# Patient Record
Sex: FEMALE | Race: WHITE | NOT HISPANIC OR LATINO | Employment: OTHER | URBAN - METROPOLITAN AREA
[De-identification: names, ages, dates, MRNs, and addresses within clinical notes are randomized per-mention and may not be internally consistent; named-entity substitution may affect disease eponyms.]

---

## 2023-03-31 DIAGNOSIS — I10 HYPERTENSION, UNSPECIFIED TYPE: ICD-10-CM

## 2023-03-31 RX ORDER — QUINAPRIL 20 MG/1
1 TABLET ORAL DAILY
COMMUNITY
Start: 2022-10-04 | End: 2023-03-31 | Stop reason: SDUPTHER

## 2023-03-31 RX ORDER — QUINAPRIL 20 MG/1
20 TABLET ORAL DAILY
Qty: 90 TABLET | Refills: 0 | Status: SHIPPED | OUTPATIENT
Start: 2023-03-31 | End: 2023-10-18 | Stop reason: ALTCHOICE

## 2023-04-06 ENCOUNTER — TELEPHONE (OUTPATIENT)
Dept: PRIMARY CARE | Facility: CLINIC | Age: 73
End: 2023-04-06
Payer: MEDICARE

## 2023-04-06 DIAGNOSIS — I10 PRIMARY HYPERTENSION: Primary | ICD-10-CM

## 2023-04-06 RX ORDER — LISINOPRIL 20 MG/1
20 TABLET ORAL DAILY
Qty: 90 TABLET | Refills: 3 | Status: SHIPPED | OUTPATIENT
Start: 2023-04-06 | End: 2024-02-21

## 2023-04-06 NOTE — TELEPHONE ENCOUNTER
Pt is callign in regards to BP RX not available anymore needs another one to go with hydrochlorothiazide - GAELP

## 2023-04-19 ENCOUNTER — TELEPHONE (OUTPATIENT)
Dept: PRIMARY CARE | Facility: CLINIC | Age: 73
End: 2023-04-19
Payer: MEDICARE

## 2023-04-19 NOTE — TELEPHONE ENCOUNTER
She is switching to Lisinopril from Quinapril is she supposed to take the Hydrochlorothiazide with the Lisinopril when she runs out of the Quinapril?

## 2023-06-20 ENCOUNTER — TELEPHONE (OUTPATIENT)
Dept: PRIMARY CARE | Facility: CLINIC | Age: 73
End: 2023-06-20
Payer: MEDICARE

## 2023-06-20 NOTE — TELEPHONE ENCOUNTER
Pt is calling in regards to back pain for a couple months now and is unable to lay down has to sleep in chair - CHITO

## 2023-06-22 ENCOUNTER — OFFICE VISIT (OUTPATIENT)
Dept: PRIMARY CARE | Facility: CLINIC | Age: 73
End: 2023-06-22
Payer: MEDICARE

## 2023-06-22 VITALS — DIASTOLIC BLOOD PRESSURE: 70 MMHG | SYSTOLIC BLOOD PRESSURE: 128 MMHG

## 2023-06-22 DIAGNOSIS — M54.50 ACUTE BILATERAL LOW BACK PAIN, UNSPECIFIED WHETHER SCIATICA PRESENT: Primary | ICD-10-CM

## 2023-06-22 DIAGNOSIS — I48.0 PAROXYSMAL ATRIAL FIBRILLATION (MULTI): ICD-10-CM

## 2023-06-22 DIAGNOSIS — E11.9 TYPE 2 DIABETES MELLITUS WITHOUT COMPLICATION, WITHOUT LONG-TERM CURRENT USE OF INSULIN (MULTI): ICD-10-CM

## 2023-06-22 PROBLEM — G47.33 OSA (OBSTRUCTIVE SLEEP APNEA): Status: ACTIVE | Noted: 2023-06-22

## 2023-06-22 PROBLEM — E78.5 HYPERLIPIDEMIA: Status: ACTIVE | Noted: 2023-06-22

## 2023-06-22 PROBLEM — E66.9 OBESITY: Status: ACTIVE | Noted: 2023-06-22

## 2023-06-22 PROBLEM — K21.9 ESOPHAGEAL REFLUX: Status: ACTIVE | Noted: 2023-06-22

## 2023-06-22 PROBLEM — I35.0 AORTIC STENOSIS: Status: ACTIVE | Noted: 2023-06-22

## 2023-06-22 PROCEDURE — 1159F MED LIST DOCD IN RCRD: CPT | Performed by: INTERNAL MEDICINE

## 2023-06-22 PROCEDURE — 99213 OFFICE O/P EST LOW 20 MIN: CPT | Performed by: INTERNAL MEDICINE

## 2023-06-22 PROCEDURE — 3078F DIAST BP <80 MM HG: CPT | Performed by: INTERNAL MEDICINE

## 2023-06-22 PROCEDURE — 3074F SYST BP LT 130 MM HG: CPT | Performed by: INTERNAL MEDICINE

## 2023-06-22 PROCEDURE — 1160F RVW MEDS BY RX/DR IN RCRD: CPT | Performed by: INTERNAL MEDICINE

## 2023-06-22 PROCEDURE — 1036F TOBACCO NON-USER: CPT | Performed by: INTERNAL MEDICINE

## 2023-06-22 PROCEDURE — 4010F ACE/ARB THERAPY RXD/TAKEN: CPT | Performed by: INTERNAL MEDICINE

## 2023-06-22 PROCEDURE — 3044F HG A1C LEVEL LT 7.0%: CPT | Performed by: INTERNAL MEDICINE

## 2023-06-22 RX ORDER — METOPROLOL SUCCINATE 25 MG/1
25 TABLET, EXTENDED RELEASE ORAL 2 TIMES DAILY
COMMUNITY
Start: 2013-10-23

## 2023-06-22 RX ORDER — PREDNISONE 10 MG/1
TABLET ORAL
Qty: 12 TABLET | Refills: 0 | Status: SHIPPED | OUTPATIENT
Start: 2023-06-22 | End: 2023-06-28

## 2023-06-22 RX ORDER — PENICILLIN V POTASSIUM 500 MG/1
TABLET, FILM COATED ORAL
COMMUNITY
End: 2023-10-18 | Stop reason: ALTCHOICE

## 2023-06-22 RX ORDER — METFORMIN HYDROCHLORIDE 500 MG/1
TABLET, EXTENDED RELEASE ORAL
COMMUNITY
Start: 2016-10-24 | End: 2023-08-09 | Stop reason: SDUPTHER

## 2023-06-22 RX ORDER — ROSUVASTATIN CALCIUM 10 MG/1
TABLET, COATED ORAL
COMMUNITY
Start: 2020-07-31

## 2023-06-22 RX ORDER — TIZANIDINE HYDROCHLORIDE 4 MG/1
4 CAPSULE, GELATIN COATED ORAL 2 TIMES DAILY PRN
Qty: 30 CAPSULE | Refills: 0 | Status: SHIPPED | OUTPATIENT
Start: 2023-06-22 | End: 2023-10-18 | Stop reason: ALTCHOICE

## 2023-06-22 RX ORDER — HYDROCHLOROTHIAZIDE 12.5 MG/1
TABLET ORAL
COMMUNITY
Start: 2022-10-04 | End: 2024-02-07 | Stop reason: SDUPTHER

## 2023-06-22 RX ORDER — RIVAROXABAN 20 MG/1
TABLET, FILM COATED ORAL
COMMUNITY
Start: 2013-07-12

## 2023-06-22 RX ORDER — METOPROLOL SUCCINATE 50 MG/1
TABLET, EXTENDED RELEASE ORAL
COMMUNITY
End: 2023-10-18 | Stop reason: ALTCHOICE

## 2023-06-22 ASSESSMENT — PAIN SCALES - GENERAL: PAINLEVEL: 10-WORST PAIN EVER

## 2023-06-22 NOTE — PATIENT INSTRUCTIONS
Low back pain   We will treat you with prednisone taper and tizanidine up to 2 times daily as needed for pain (more at night time as it may make you drowsy)  PT will obtain an xray as well

## 2023-06-22 NOTE — PROGRESS NOTES
Subjective   Patient ID: Rachael Shah is a 72 y.o. female.    HPI  LBP for the past 2 month no particular injury noted she has had some issues with back pain in the past  Sometimes radiates down her legs can be either leg  Nighttime is bad  She has been to the chiropractor without success  Has not noted any actual loss of strength  Past medical history significant for atrial fibrillation on Xarelto  Hypertension  Diabetes    Review of Systems    Objective   Physical Exam  Well-developed overweight no acute distress  Cardiovascular regular rate and rhythm  Lungs clear to auscultation percussion  Minimal pain to palpation in the low back there is pain to straight leg raise on the right reflexes diminished bilaterally  Strength 5 of 5 without deficits noted    Assessment/Plan   #1 low back pain without neurologic deficits but with radicular signs ongoing now for about the last 1 to 2 months no known injury.  She has been to the chiropractor I would like to obtain an x-ray of her LS spine we will start a prednisone taper starting with 30 mg decreasing by 10 mg every 2 days until complete she was unable to take nonsteroidal anti-inflammatories because of Xarelto tizanidine 4 mg twice daily as needed especially at nighttime have given her some easy exercises and also referral to physical therapy  2.  Hypertension good control continue current regimen  3.  History of paroxysmal atrial fibrillation clinically in sinus rhythm  4.  Diabetes which has been under adequate control she does know her sugar may increase with the prednisone and she will follow closely  20 minutes were spent with patient of which greater than 50% was spent in counseling and coordination of care  This note was partially generated using the Dragon voice recognition system.  There may be some incorrect wording ,grammar, spelling or punctuation errors that were not corrected prior to committing the note to the medical record.

## 2023-08-09 DIAGNOSIS — E13.9 DIABETES 1.5, MANAGED AS TYPE 2 (MULTI): ICD-10-CM

## 2023-08-09 DIAGNOSIS — E13.9 DIABETES 1.5, MANAGED AS TYPE 2 (MULTI): Primary | ICD-10-CM

## 2023-08-09 RX ORDER — METFORMIN HYDROCHLORIDE 500 MG/1
TABLET, EXTENDED RELEASE ORAL
Qty: 360 TABLET | Refills: 3 | Status: SHIPPED | OUTPATIENT
Start: 2023-08-09 | End: 2023-08-09 | Stop reason: SDUPTHER

## 2023-08-09 RX ORDER — METFORMIN HYDROCHLORIDE 500 MG/1
TABLET, EXTENDED RELEASE ORAL
Qty: 360 TABLET | Refills: 3 | Status: SHIPPED | OUTPATIENT
Start: 2023-08-09 | End: 2024-02-07 | Stop reason: SDUPTHER

## 2023-09-16 PROBLEM — L21.8 OTHER SEBORRHEIC DERMATITIS: Status: ACTIVE | Noted: 2020-12-15

## 2023-09-16 PROBLEM — H90.3 SENSORINEURAL HEARING LOSS, BILATERAL: Status: ACTIVE | Noted: 2021-12-09

## 2023-09-16 PROBLEM — D18.01 HEMANGIOMA OF SKIN AND SUBCUTANEOUS TISSUE: Status: ACTIVE | Noted: 2020-12-15

## 2023-09-16 PROBLEM — D22.5 MELANOCYTIC NEVI OF TRUNK: Status: ACTIVE | Noted: 2020-12-15

## 2023-09-16 PROBLEM — R06.2 WHEEZING ON AUSCULTATION: Status: ACTIVE | Noted: 2023-09-16

## 2023-09-16 PROBLEM — L82.1 OTHER SEBORRHEIC KERATOSIS: Status: ACTIVE | Noted: 2020-12-15

## 2023-09-16 PROBLEM — D22.62 MELANOCYTIC NEVI OF LEFT UPPER LIMB, INCLUDING SHOULDER: Status: ACTIVE | Noted: 2020-12-15

## 2023-09-16 PROBLEM — L57.0 ACTINIC KERATOSIS: Status: ACTIVE | Noted: 2020-12-15

## 2023-09-16 PROBLEM — D22.72 MELANOCYTIC NEVI OF LEFT LOWER LIMB, INCLUDING HIP: Status: ACTIVE | Noted: 2020-12-15

## 2023-09-16 PROBLEM — J30.2 SEASONAL ALLERGIC RHINITIS: Status: ACTIVE | Noted: 2021-12-09

## 2023-09-16 PROBLEM — D48.5 NEOPLASM OF UNCERTAIN BEHAVIOR OF SKIN: Status: ACTIVE | Noted: 2020-12-15

## 2023-09-16 RX ORDER — OXYCODONE AND ACETAMINOPHEN 5; 325 MG/1; MG/1
TABLET ORAL
COMMUNITY
End: 2023-10-18 | Stop reason: ALTCHOICE

## 2023-09-16 RX ORDER — FLUOROURACIL 50 MG/G
1 CREAM TOPICAL
COMMUNITY
Start: 2020-01-24 | End: 2023-10-18 | Stop reason: ALTCHOICE

## 2023-09-16 RX ORDER — AZELASTINE 1 MG/ML
2 SPRAY, METERED NASAL 2 TIMES DAILY
COMMUNITY

## 2023-09-16 RX ORDER — CELECOXIB 200 MG/1
1 CAPSULE ORAL 2 TIMES DAILY
COMMUNITY
Start: 2019-01-03 | End: 2023-10-18 | Stop reason: ALTCHOICE

## 2023-09-16 RX ORDER — PREDNISONE 10 MG/1
TABLET ORAL
COMMUNITY
End: 2023-10-18 | Stop reason: ALTCHOICE

## 2023-09-16 RX ORDER — CLOTRIMAZOLE AND BETAMETHASONE DIPROPIONATE 10; .64 MG/G; MG/G
CREAM TOPICAL
COMMUNITY
End: 2023-10-18 | Stop reason: ALTCHOICE

## 2023-09-16 RX ORDER — DILTIAZEM HYDROCHLORIDE 180 MG/1
180 CAPSULE, EXTENDED RELEASE ORAL DAILY
COMMUNITY
End: 2023-10-18 | Stop reason: ALTCHOICE

## 2023-09-16 RX ORDER — DOCUSATE SODIUM 100 MG/1
1 CAPSULE, LIQUID FILLED ORAL 2 TIMES DAILY
COMMUNITY
Start: 2019-01-03 | End: 2024-02-12 | Stop reason: ALTCHOICE

## 2023-09-16 RX ORDER — FLUTICASONE PROPIONATE 50 MCG
2 SPRAY, SUSPENSION (ML) NASAL DAILY
COMMUNITY
Start: 2015-12-30

## 2023-09-16 RX ORDER — ASCORBIC ACID 500 MG
1 TABLET ORAL 2 TIMES DAILY
COMMUNITY
Start: 2019-01-03 | End: 2024-02-12 | Stop reason: ALTCHOICE

## 2023-09-16 RX ORDER — MINERAL OIL
1 ENEMA (ML) RECTAL DAILY
COMMUNITY
End: 2023-10-18 | Stop reason: ALTCHOICE

## 2023-09-16 RX ORDER — CARISOPRODOL 350 MG/1
1 TABLET ORAL EVERY 8 HOURS PRN
COMMUNITY
Start: 2019-01-03 | End: 2023-10-18 | Stop reason: ALTCHOICE

## 2023-09-16 RX ORDER — ACETAMINOPHEN 500 MG
1 TABLET ORAL DAILY
COMMUNITY

## 2023-09-16 RX ORDER — MONTELUKAST SODIUM 10 MG/1
1 TABLET ORAL DAILY
COMMUNITY
End: 2023-10-18 | Stop reason: ALTCHOICE

## 2023-09-16 RX ORDER — QUINAPRIL HYDROCHLORIDE AND HYDROCHLOROTHIAZIDE 20; 12.5 MG/1; MG/1
TABLET, FILM COATED ORAL
COMMUNITY
Start: 2022-10-02 | End: 2023-10-18 | Stop reason: ALTCHOICE

## 2023-09-16 RX ORDER — ASPIRIN 325 MG
1 TABLET, DELAYED RELEASE (ENTERIC COATED) ORAL 2 TIMES DAILY
COMMUNITY
Start: 2019-01-03 | End: 2023-10-18 | Stop reason: ALTCHOICE

## 2023-10-03 DIAGNOSIS — M79.651 PAIN IN RIGHT THIGH: Primary | ICD-10-CM

## 2023-10-03 DIAGNOSIS — M62.81 WEAKNESS OF TRUNK MUSCULATURE: ICD-10-CM

## 2023-10-04 ENCOUNTER — TREATMENT (OUTPATIENT)
Dept: PHYSICAL THERAPY | Facility: CLINIC | Age: 73
End: 2023-10-04
Payer: MEDICARE

## 2023-10-04 DIAGNOSIS — M62.81 WEAKNESS OF TRUNK MUSCULATURE: ICD-10-CM

## 2023-10-04 DIAGNOSIS — M79.651 RIGHT THIGH PAIN: Primary | ICD-10-CM

## 2023-10-04 DIAGNOSIS — M79.651 PAIN IN RIGHT THIGH: ICD-10-CM

## 2023-10-04 PROCEDURE — 97110 THERAPEUTIC EXERCISES: CPT | Mod: GP | Performed by: PHYSICAL THERAPIST

## 2023-10-04 PROCEDURE — 97035 APP MDLTY 1+ULTRASOUND EA 15: CPT | Mod: GP | Performed by: PHYSICAL THERAPIST

## 2023-10-04 NOTE — PROGRESS NOTES
"Physical Therapy    Physical Therapy Treatment    Patient Name: Rachael Shah  MRN: [id number]  Today's Date: 10/6/2023       Visit Number:  11 (1/3)  Assessment: All prior information for this patient's episode which began on 7/6/2023 is documented in the [employer] system.\"      Patient reported no increase in pain in affected area, even adding some resistance to exercises.  She appears to be gaining strength in hip abductors as the standing lifts are getting easier.      Plan: Continue with ultrasound, taping and progressive gluteal, hamstring and core strengthening and HS stretching.       Current Problem  1. Right thigh pain        2. Pain in right thigh  Follow Up In Physical Therapy      3. Weakness of trunk musculature  Follow Up In Physical Therapy          Subjective     General Garner that  tape and ultrasound were helpful.  Tape lasted 2 days.  Rates pain at 2/10 at top of right Hamstring.  She is planning on taking a trip to [place], which will require sitting for about 6 hours.  She will have help to drive.       Precautions: low fall risk, DM, Afib/flutter, right TKR, L TSR        Pain 2/10, hamstring origin R/L       Objective         Treatments:  Therapeutic Exercise  Therapeutic Exercise Activity 1: Hamstring stretch seated 3 x 30\" R/L  Therapeutic Exercise Activity 2: Standing HS curl 1# 2 x 10  Therapeutic Exercise Activity 3: Standing Hip abduction 1# 2 x 10  Therapeutic Exercise Activity 4: Mini squats 2 x 10  Therapeutic Exercise Activity 6: Recumbent stepper (seat # 13), Level; 1.0  Therapeutic Exercise Activity 7: Standing hip abduction 1# 2x 10, R/L    Manual Therapy  Manual Therapy Activity 1: Therapeutic tape placed on right medial and lateral hamstring muscles, crossing over area of most pain in gluteal area.    Modalities  Modality 1: Timed Ultrasound (1.0 W/cm2, 1.3 Mhz, continuous, x 6 minutes per side R/L to lower gluteal/hamstring attachment)      OP EDUCATION: Reminder of wear schedule and " removal procedures for therapeutic tape.  She was also educated on how to replace the tape if it is helpful and she would like to use it for her trip to [place].       Goals:    Encounter Problems       Encounter Problems (Active)       PT Problem       Activity Limitation: Pt will return to leisure, ADL's, sleep and functional activities with zero restrictions and without symptoms interfering with her positioning/ activity.       Start:  10/04/23    Expected End:  11/03/23            Pain: Pt will be independent with symptom management in order to decrease pain by>=75% to increase participation with ADL, IADL, work and leisure skills.       Start:  10/04/23    Expected End:  11/03/23            Posture: Patient will demonstrate good understanding / awareness of correct postural alignment in sitting and standing and will self correct independently at least 75% of the time to reduce symptoms and improve mobility/ gait mechanics.       Start:  10/04/23    Expected End:  11/03/23            Strength: Pt will increase strength of B hips by >=1/3 MMT grade in all planes and Good TA bracing with tolerating up to 20 reps of core stabilization exercises without increased symptoms in order to normalize mobility       Start:  10/04/23    Expected End:  11/03/23            Modified Oswestry LB, Pt will improve outcome measures score of Modified Oswestry LB to decrease by >=10% disability for increased independence and ease with ADL/IADL's skills and work/leisure activities.       Start:  10/04/23    Expected End:  11/03/23               PT Problem       HEP, Pt will demonstrate independence and compliance with safe and appropriate HEP for lumbar ROM, core and hip strength, dynamic lumbar stabilization, balance, LQ flexibility, pain management and postural training       Start:  10/06/23    Expected End:  11/03/23            ack care principles, Pt will be independent and compliant with safe body mechanics and back care principles  for spinal protection and prevent re-occurence or flare up of symptoms       Start:  10/06/23    Expected End:  11/03/23

## 2023-10-17 DIAGNOSIS — M62.81 WEAKNESS OF TRUNK MUSCULATURE: ICD-10-CM

## 2023-10-17 DIAGNOSIS — M79.651 PAIN IN RIGHT THIGH: Primary | ICD-10-CM

## 2023-10-18 ENCOUNTER — APPOINTMENT (OUTPATIENT)
Dept: PHYSICAL THERAPY | Facility: CLINIC | Age: 73
End: 2023-10-18
Payer: MEDICARE

## 2023-10-18 ENCOUNTER — APPOINTMENT (OUTPATIENT)
Dept: ENDOCRINOLOGY | Facility: CLINIC | Age: 73
End: 2023-10-18
Payer: MEDICARE

## 2023-10-18 ENCOUNTER — OFFICE VISIT (OUTPATIENT)
Dept: PRIMARY CARE | Facility: CLINIC | Age: 73
End: 2023-10-18
Payer: MEDICARE

## 2023-10-18 VITALS
DIASTOLIC BLOOD PRESSURE: 60 MMHG | OXYGEN SATURATION: 98 % | SYSTOLIC BLOOD PRESSURE: 124 MMHG | HEART RATE: 73 BPM | TEMPERATURE: 98.2 F

## 2023-10-18 DIAGNOSIS — I48.0 PAROXYSMAL ATRIAL FIBRILLATION (MULTI): ICD-10-CM

## 2023-10-18 DIAGNOSIS — R06.2 WHEEZING ON AUSCULTATION: ICD-10-CM

## 2023-10-18 DIAGNOSIS — R05.1 ACUTE COUGH: Primary | ICD-10-CM

## 2023-10-18 PROBLEM — L57.0 ACTINIC KERATOSIS: Status: RESOLVED | Noted: 2020-12-15 | Resolved: 2023-10-18

## 2023-10-18 PROBLEM — D22.72 MELANOCYTIC NEVI OF LEFT LOWER LIMB, INCLUDING HIP: Status: RESOLVED | Noted: 2020-12-15 | Resolved: 2023-10-18

## 2023-10-18 PROBLEM — H90.3 SENSORINEURAL HEARING LOSS, BILATERAL: Status: RESOLVED | Noted: 2021-12-09 | Resolved: 2023-10-18

## 2023-10-18 PROBLEM — J30.2 SEASONAL ALLERGIC RHINITIS: Status: RESOLVED | Noted: 2021-12-09 | Resolved: 2023-10-18

## 2023-10-18 PROBLEM — L21.8 OTHER SEBORRHEIC DERMATITIS: Status: RESOLVED | Noted: 2020-12-15 | Resolved: 2023-10-18

## 2023-10-18 PROBLEM — D22.5 MELANOCYTIC NEVI OF TRUNK: Status: RESOLVED | Noted: 2020-12-15 | Resolved: 2023-10-18

## 2023-10-18 PROBLEM — D18.01 HEMANGIOMA OF SKIN AND SUBCUTANEOUS TISSUE: Status: RESOLVED | Noted: 2020-12-15 | Resolved: 2023-10-18

## 2023-10-18 PROBLEM — M79.651 RIGHT THIGH PAIN: Status: RESOLVED | Noted: 2023-10-04 | Resolved: 2023-10-18

## 2023-10-18 PROBLEM — D22.62 MELANOCYTIC NEVI OF LEFT UPPER LIMB, INCLUDING SHOULDER: Status: RESOLVED | Noted: 2020-12-15 | Resolved: 2023-10-18

## 2023-10-18 PROBLEM — M62.81 WEAKNESS OF TRUNK MUSCULATURE: Status: RESOLVED | Noted: 2023-10-04 | Resolved: 2023-10-18

## 2023-10-18 PROBLEM — L82.1 OTHER SEBORRHEIC KERATOSIS: Status: RESOLVED | Noted: 2020-12-15 | Resolved: 2023-10-18

## 2023-10-18 PROCEDURE — 3008F BODY MASS INDEX DOCD: CPT | Performed by: INTERNAL MEDICINE

## 2023-10-18 PROCEDURE — 1159F MED LIST DOCD IN RCRD: CPT | Performed by: INTERNAL MEDICINE

## 2023-10-18 PROCEDURE — 1160F RVW MEDS BY RX/DR IN RCRD: CPT | Performed by: INTERNAL MEDICINE

## 2023-10-18 PROCEDURE — 4010F ACE/ARB THERAPY RXD/TAKEN: CPT | Performed by: INTERNAL MEDICINE

## 2023-10-18 PROCEDURE — 1036F TOBACCO NON-USER: CPT | Performed by: INTERNAL MEDICINE

## 2023-10-18 PROCEDURE — 3044F HG A1C LEVEL LT 7.0%: CPT | Performed by: INTERNAL MEDICINE

## 2023-10-18 PROCEDURE — 1126F AMNT PAIN NOTED NONE PRSNT: CPT | Performed by: INTERNAL MEDICINE

## 2023-10-18 PROCEDURE — 3074F SYST BP LT 130 MM HG: CPT | Performed by: INTERNAL MEDICINE

## 2023-10-18 PROCEDURE — 99213 OFFICE O/P EST LOW 20 MIN: CPT | Performed by: INTERNAL MEDICINE

## 2023-10-18 PROCEDURE — 3078F DIAST BP <80 MM HG: CPT | Performed by: INTERNAL MEDICINE

## 2023-10-18 RX ORDER — DOXYCYCLINE 100 MG/1
100 CAPSULE ORAL 2 TIMES DAILY
Qty: 14 CAPSULE | Refills: 0 | Status: SHIPPED | OUTPATIENT
Start: 2023-10-18 | End: 2023-10-25

## 2023-10-18 RX ORDER — PREDNISONE 10 MG/1
TABLET ORAL
Qty: 12 TABLET | Refills: 0 | Status: SHIPPED | OUTPATIENT
Start: 2023-10-18 | End: 2023-10-23

## 2023-10-18 RX ORDER — BENZONATATE 200 MG/1
200 CAPSULE ORAL 3 TIMES DAILY PRN
Qty: 42 CAPSULE | Refills: 0 | Status: SHIPPED | OUTPATIENT
Start: 2023-10-18 | End: 2023-11-17

## 2023-10-18 NOTE — PROGRESS NOTES
Subjective   Patient ID: Rachael Shah is a 73 y.o. female.    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[facility])     Hypertension     ANTON (obstructive sleep apnea)                 Objective   Physical Exam  Well-developed overweight no acute distress  HEENT there is cobblestoning present in the posterior pharynx  Turbinates are normal  There are scattered rhonchi present throughout the lungs with increased MIR ratio noted throughout there are no focal abnormalities noted  Cardiovascular regular rate and rhythm    Assessment/Plan   #1 cough which I think is more consistent with an asthmatic bronchitis we have opted to go ahead and empirically treat with antibiotic doxycycline 100 mg twice daily a prednisone taper starting with 30 mg decreasing by 10 mg every 2 days until complete Tessalon Perles 3 times daily as needed as well  2.  Hypertension good control continue current regimen  3.  History of paroxysmal atrial fibrillation clinically in sinus rhythm  4.  Health maintenance  She did receive COVID-19 vaccination and flu vaccine  I do recommend RSV for her  Does not appear that she has had Shingrix vaccination either and I have recommended this  25 minutes were spent with patient of which greater than 50% was spent in counseling and coordination of care  This note was partially generated using the Dragon voice recognition system.  There may be some incorrect wording ,grammar, spelling or punctuation errors that were not corrected prior to committing the note to the medical record.

## 2023-10-18 NOTE — PATIENT INSTRUCTIONS
For the respiratory infection with wheezing although this may be viral in nature as possible it is an asthmatic bronchitis so we are going to empirically place you on doxycycline 100 mg twice daily as well as a prednisone taper Tessalon Perles as needed for cough  As I Review records it does appear that you need the Shingrix vaccination this is a series of 2 vaccinations and you will need to get this at the pharmacy  You are good on pneumonia vaccine you have had both a Prevnar 13 as well as Pneumovax which is all you need   I do recommend RSV vaccination for you and this will need to be at the pharmacy as well

## 2023-10-24 ENCOUNTER — TELEPHONE (OUTPATIENT)
Dept: PRIMARY CARE | Facility: CLINIC | Age: 73
End: 2023-10-24
Payer: MEDICARE

## 2023-10-24 DIAGNOSIS — R06.2 WHEEZING: Primary | ICD-10-CM

## 2023-10-24 RX ORDER — FLUTICASONE PROPIONATE 110 UG/1
1 AEROSOL, METERED RESPIRATORY (INHALATION)
Qty: 12 G | Refills: 0 | Status: SHIPPED | OUTPATIENT
Start: 2023-10-24 | End: 2023-11-20

## 2023-10-24 NOTE — TELEPHONE ENCOUNTER
She is about 50% better but her wheezing is really bad at night and some in the am is there an inhaler you can prescribe or anything else to do?

## 2023-10-25 ENCOUNTER — DOCUMENTATION (OUTPATIENT)
Dept: PHYSICAL THERAPY | Facility: CLINIC | Age: 73
End: 2023-10-25
Payer: MEDICARE

## 2023-10-25 ENCOUNTER — APPOINTMENT (OUTPATIENT)
Dept: PHYSICAL THERAPY | Facility: CLINIC | Age: 73
End: 2023-10-25
Payer: MEDICARE

## 2023-10-25 NOTE — PROGRESS NOTES
Physical Therapy                 Therapy Communication Note    Patient Name: Rachael Shah  MRN: [id number]  Today's Date: 10/25/2023     Discipline: Physical Therapy    Missed Visit Reason:  Patient called and left a message.  She canceled for today due to respiratory illness.  She also requested discharge at this time as she is doing well.    Missed Time: Cancel

## 2023-10-25 NOTE — PROGRESS NOTES
Physical Therapy    Discharge Summary    Name: Rachael Shah  MRN: [id number]  : 1950  Date: 10/25/23    Discharge Summary: PT    Discharge Information: Date of discharge 10/25/23, Date of last visit 10/04/23, Date of evaluation 23, Number of attended visits 12, Referred by Richelle, and Referred for Acute Bilateral LBP    Therapy Summary: Patient completed 12 visits of physical therapy, focusing on hamstring stretching, core and hip strengthening.  Her LBP has resolved and is not restricting her mobility. She reports 50% or greater reduction of pain in right hamstring area, improved hamstring flexibility, improved tolerance to sitting/driving.  She is I and adherent with her HEP.    Discharge Status: Goals partially to fully met.  Discharge to HEP.     Rehab Discharge Reason: Achieved all and/or the most significant goals(s) and Patient requested due to insurance authorization

## 2023-11-20 DIAGNOSIS — R06.2 WHEEZING: ICD-10-CM

## 2023-11-20 RX ORDER — FLUTICASONE PROPIONATE 110 UG/1
1 AEROSOL, METERED RESPIRATORY (INHALATION)
Qty: 12 G | Refills: 3 | Status: SHIPPED | OUTPATIENT
Start: 2023-11-20 | End: 2024-02-12 | Stop reason: ALTCHOICE

## 2024-01-05 ENCOUNTER — TELEPHONE (OUTPATIENT)
Dept: PRIMARY CARE | Facility: CLINIC | Age: 74
End: 2024-01-05
Payer: MEDICARE

## 2024-01-05 NOTE — TELEPHONE ENCOUNTER
Post nasal drip, sinus pressure, laryngitis started yesterday. No fever or chills. Her  has COVID and she will continue to test as she has been negative but what to take for these sxs now?

## 2024-01-18 ENCOUNTER — CLINICAL SUPPORT (OUTPATIENT)
Dept: AUDIOLOGY | Facility: CLINIC | Age: 74
End: 2024-01-18
Payer: MEDICARE

## 2024-01-18 DIAGNOSIS — H90.3 SENSORINEURAL HEARING LOSS (SNHL) OF BOTH EARS: Primary | ICD-10-CM

## 2024-01-18 NOTE — PROGRESS NOTES
HEARING AID CHECK    Name:  Rachael Shah  :  1950  Age:  73 y.o.    HEARING AID INFORMATION:    Right Hearing Aid  Oticon more 1  SN: [id number]  Warranty: 2025  Left Hearing Aid  Oticon more 1  SN: [id number]  Warranty: 2025    SN: [id number]   Length   Right: #2 85 gain  Left: #2 85 gain  Dome/Earmold  Right: 8 mm open base  Left: 8 mm open base  Wax Filter  pro wax mini fit  Battery Size  rechargeable      HCS Follow-up Expiration  2023      HISTORY:    Patient seen for routine hearing aid check.  No reported concerns or complaints with the sound quality or Bluetooth connectivity.  Patient reported some crackling sensations in the left ear as she has an upper respiratory infection that is still clearing.    EXAM/PROCEDURES:    Otoscopy revealed minimal non-occluding cerumen with normal appearing tympanic membranes, bilaterally.    Cleaned and checked.    Vacuumed sharda ports and  ports. Replaced wax filters, retention wires, and domes.     Placed in aurovac drying chamber. Listening check indicated hearing aids functioning appropriately with no distortion or internal feedback indicated.     Connected to hearing aid fitting software.  Completed firmware update.  No further programming changes were made at this time.      RECOMMENDATIONS AND FOLLOW-UP:    Recommended and scheduled 6-month follow-up HAC.  Patient to contact the office sooner if problems arise.    Patient was advised to schedule with Dr. Nunn if her ear symptoms persist.      Cynthia Gonzales  Clinical Audiologist

## 2024-02-07 DIAGNOSIS — E13.9 DIABETES 1.5, MANAGED AS TYPE 2 (MULTI): ICD-10-CM

## 2024-02-07 DIAGNOSIS — I48.0 PAROXYSMAL ATRIAL FIBRILLATION (MULTI): ICD-10-CM

## 2024-02-07 RX ORDER — HYDROCHLOROTHIAZIDE 12.5 MG/1
TABLET ORAL
Qty: 90 TABLET | Refills: 3 | Status: SHIPPED | OUTPATIENT
Start: 2024-02-07 | End: 2024-02-07

## 2024-02-07 RX ORDER — METFORMIN HYDROCHLORIDE 500 MG/1
TABLET, EXTENDED RELEASE ORAL
Qty: 360 TABLET | Refills: 3 | Status: SHIPPED | OUTPATIENT
Start: 2024-02-07 | End: 2024-04-29 | Stop reason: SINTOL

## 2024-02-07 RX ORDER — HYDROCHLOROTHIAZIDE 12.5 MG/1
TABLET ORAL
Qty: 90 TABLET | Refills: 3 | Status: SHIPPED | OUTPATIENT
Start: 2024-02-07

## 2024-02-12 ENCOUNTER — OFFICE VISIT (OUTPATIENT)
Dept: ENDOCRINOLOGY | Facility: CLINIC | Age: 74
End: 2024-02-12
Payer: MEDICARE

## 2024-02-12 ENCOUNTER — TELEPHONE (OUTPATIENT)
Dept: ENDOCRINOLOGY | Facility: CLINIC | Age: 74
End: 2024-02-12

## 2024-02-12 VITALS
SYSTOLIC BLOOD PRESSURE: 122 MMHG | BODY MASS INDEX: 39.39 KG/M2 | TEMPERATURE: 96.7 F | HEIGHT: 65 IN | DIASTOLIC BLOOD PRESSURE: 75 MMHG | HEART RATE: 66 BPM | WEIGHT: 236.4 LBS

## 2024-02-12 DIAGNOSIS — E66.8 CONSTITUTIONAL OBESITY: ICD-10-CM

## 2024-02-12 DIAGNOSIS — Z76.89 ENCOUNTER FOR WEIGHT MANAGEMENT: ICD-10-CM

## 2024-02-12 DIAGNOSIS — E11.9 TYPE 2 DIABETES MELLITUS WITHOUT COMPLICATION, WITHOUT LONG-TERM CURRENT USE OF INSULIN (MULTI): ICD-10-CM

## 2024-02-12 DIAGNOSIS — E11.9 TYPE 2 DIABETES MELLITUS WITHOUT COMPLICATION, WITHOUT LONG-TERM CURRENT USE OF INSULIN (MULTI): Primary | ICD-10-CM

## 2024-02-12 PROCEDURE — 1157F ADVNC CARE PLAN IN RCRD: CPT | Performed by: INTERNAL MEDICINE

## 2024-02-12 PROCEDURE — 1159F MED LIST DOCD IN RCRD: CPT | Performed by: INTERNAL MEDICINE

## 2024-02-12 PROCEDURE — 99204 OFFICE O/P NEW MOD 45 MIN: CPT | Performed by: INTERNAL MEDICINE

## 2024-02-12 PROCEDURE — 3008F BODY MASS INDEX DOCD: CPT | Performed by: INTERNAL MEDICINE

## 2024-02-12 PROCEDURE — 1036F TOBACCO NON-USER: CPT | Performed by: INTERNAL MEDICINE

## 2024-02-12 PROCEDURE — 3074F SYST BP LT 130 MM HG: CPT | Performed by: INTERNAL MEDICINE

## 2024-02-12 PROCEDURE — 1126F AMNT PAIN NOTED NONE PRSNT: CPT | Performed by: INTERNAL MEDICINE

## 2024-02-12 PROCEDURE — 4010F ACE/ARB THERAPY RXD/TAKEN: CPT | Performed by: INTERNAL MEDICINE

## 2024-02-12 PROCEDURE — 3078F DIAST BP <80 MM HG: CPT | Performed by: INTERNAL MEDICINE

## 2024-02-12 RX ORDER — TIRZEPATIDE 2.5 MG/.5ML
2.5 INJECTION, SOLUTION SUBCUTANEOUS
Qty: 0.5 ML | Refills: 2 | Status: SHIPPED | OUTPATIENT
Start: 2024-02-12 | End: 2024-02-29 | Stop reason: CLARIF

## 2024-02-12 NOTE — TELEPHONE ENCOUNTER
Prior Auth for mounjaro pending determination  Submitted on 2/12/24 via epic    *likely will be denied for not having trialed 2 preferred: trulicity, victoza, rybelsus, byetta, and ozempic

## 2024-02-12 NOTE — PROGRESS NOTES
Patient is seen at the request of Dr. Peoples for my opinion regarding weight management. My final recommendations will be communicated back to the requesting provider by way of shared medical record.    NEW  Subjective   Rachael Shah is a 73 y.o. female with a hx of Afib (h/o multiple cardioversions and a few ablations), DM T2, HTN, ANTON, HLD who presents for weight management and obesity.    1. Weight history: Weight has increased gradually the last 15 years. Has gained 15lbs in the last year.   --Any previous programs: Tried Noom- lost 15lbs, then gained back     2. Sleep: Has ANTON- uses cpap     3. Stress: 5/10, retired, volunteers, , no children, has 4 dogs!     4. Exercise: no    5. Appetite control: controlled -- is not a vegetable fan.  Breakfast: english muffin with coffeee  Lunch: sandwich/big salad  Dinner: meat or pasta with fruit and carb  Snacks: usually during the day. Cookies/piece of cheese/granola bar     Any personal history of pancreatitis?: no  Any personal or family history of medullary thyroid cancer or MEN (multiple endocrine neoplasia)?: no    6. Diabetes T2DM : on metformin  Discussed adding a GLP-1       Current Outpatient Medications:     azelastine (Astelin) 137 mcg (0.1 %) nasal spray, Administer 2 sprays into each nostril 2 times a day., Disp: , Rfl:     cholecalciferol (Vitamin D-3) 50 mcg (2,000 unit) capsule, Take 1 capsule (50 mcg) by mouth early in the morning.., Disp: , Rfl:     fluticasone (Flonase) 50 mcg/actuation nasal spray, Administer 2 sprays into each nostril once daily., Disp: , Rfl:     hydroCHLOROthiazide (HYDRODiuril) 12.5 mg tablet, Take I tablet daily, Disp: 90 tablet, Rfl: 3    lisinopril (PriniviL) 20 mg tablet, Take 1 tablet (20 mg) by mouth once daily., Disp: 90 tablet, Rfl: 3    metFORMIN  mg 24 hr tablet, Take 4 daily, Disp: 360 tablet, Rfl: 3    metoprolol succinate XL (Toprol-XL) 25 mg 24 hr tablet, Take 1 tablet (25 mg) by mouth 2  times a day., Disp: , Rfl:     multivit-min/iron/FA/vit K/lut (CENTRUM SILVER WOMEN ORAL), Take 1 tablet by mouth once daily., Disp: , Rfl:     rosuvastatin (Crestor) 10 mg tablet, rosuvastatin 10 mg tablet  TAKE 1 TABLET BY MOUTH EVERY DAY, Disp: , Rfl:     Xarelto 20 mg tablet, Xarelto 20 mg tablet  TAKE 1 TABLET BY MOUTH DAILY WITH THE EVENING MEAL, Disp: , Rfl:     ROS:  System: normal  Eyes : no visual changes  Neck : no tenderness, no new lumps/bumps  Respiratory : no SOB  Cardiovascular : no chest pain, no palpitations  Gastro-Intestinal : no abdominal concerns  Neurological : no numbness or tingling in the extremities  Musculoskeletal : no joint paint, no muscle pain  Skin : no unusual rashes  Psychiatric : no depression, no anxiety  See HPI for Endocrine ROS    Past Medical History:   Diagnosis Date    Atrial fibrillation (CMS/[facility])     Diabetes 1.5, 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    Social History     Socioeconomic History    Marital status:      Spouse name: Not on file    Number of children: Not on file    Years of education: Not on file    Highest education level: Not on file   Occupational History    Not on file   Tobacco Use    Smoking status: Former     Years: 15     Types: Cigarettes    Smokeless tobacco: Never   Vaping Use    Vaping Use: Never used   Substance and Sexual Activity    Alcohol use: Not on file    Drug use: Not on file    Sexual activity: Not on file   Other Topics Concern    Not on file   Social History Narrative    Not on file     Social Determinants of Health     Financial Resource Strain: Not on file   Food Insecurity: Not on file   Transportation Needs: Not on file   Physical Activity: Not on file   Stress: Not on file   Social Connections: Not on file   Intimate Partner Violence: Not on file   Housing Stability: Not on file       Objective    Physical Exam:  There were no vitals taken  for this visit.  General : alert and oriented X3, no acute distress  Eyes : EOMI   Neck : non tender, supple  Thyroid : no palpable nodules  Heart : regular rate and rhythm  ABD : no obvious abnormalities, soft to palpation  Extremities : no edema  Neuro : normal  Other :    Assessment/Plan   Rachael Shah is a 73 y.o. 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    1. Weight Management : Reviewed the principles of energy metabolism, caloric intake and expenditure, and rationale for a treatment program.  Also reinforced need for reduced calorie, low fat diet and increased physical activity.    We reviewed the possibility of starting an interdisciplinary lifestyle intervention program involving improvement of the diet, a personalized exercise program, efforts to reduce the stress and the possibility of using appetite suppressant medications in an effort to help with the weight loss process.  The patient expressed interest in the plan.    2. Nutrition : I discussed trying one of the diet approaches we have here in the program : Mediterranean lifestyle, ketosis diet.  The patient will be referred to the Registered Dietician for education on their diet of choice.  The dietary booklet was provided in the visit today.    2/12/24: 236lb, 39.34kg/m2    3. Sleep : Has ANTON - on CPAP    4. Stress : 5/10, retired, volunteers, , no children, has 4 dogs!     5. Exercise : none -- suggested water exercises    6. Appetite : is high  Discussed AOM's  Would AVOID a stimulant - difficult to control Afib  Consider contrave.    7. DM2 : on metformin  Will add mounjaro 2.5mg/wk.    Follow up in a group visit.  Sky Cruz MD

## 2024-02-13 DIAGNOSIS — Z00.00 ROUTINE HEALTH MAINTENANCE: ICD-10-CM

## 2024-02-15 ENCOUNTER — TELEMEDICINE CLINICAL SUPPORT (OUTPATIENT)
Dept: ENDOCRINOLOGY | Facility: CLINIC | Age: 74
End: 2024-02-15
Payer: MEDICARE

## 2024-02-15 VITALS — WEIGHT: 236 LBS | HEIGHT: 65 IN | BODY MASS INDEX: 39.32 KG/M2

## 2024-02-15 DIAGNOSIS — Z71.3 DIETARY COUNSELING: ICD-10-CM

## 2024-02-15 DIAGNOSIS — E11.9 TYPE 2 DIABETES MELLITUS WITHOUT COMPLICATION, WITHOUT LONG-TERM CURRENT USE OF INSULIN (MULTI): Primary | ICD-10-CM

## 2024-02-15 PROCEDURE — 97802 MEDICAL NUTRITION INDIV IN: CPT | Performed by: DIETITIAN, REGISTERED

## 2024-02-15 RX ORDER — SEMAGLUTIDE 0.68 MG/ML
0.25 INJECTION, SOLUTION SUBCUTANEOUS
Qty: 2 ML | Refills: 3 | Status: SHIPPED | OUTPATIENT
Start: 2024-02-15

## 2024-02-15 NOTE — PROGRESS NOTES
"Initial Nutrition Assessment    Patient was referred to nutrition by Dr. Papa Cruz for weight management/desire to lose weight. Other PMHX considered is DM, HLD, HTN and reflux. Pt also with history of A-fib and ANTON.     Diet recall reveals a consistent meal pattern with rare missed meals; however, possible intakes of larger portions that recommended for desired weight loss and some calorically dense foods all likely contributing to lack of desired weight loss at this time. Fluids are currently not meeting recommendations, and likely contributing to dehydration. Pt is not incorporating consistent physical activity at this time and would likely benefit from increased structured activity to assist in achieving goals. See all interventions/recommendations below as discussed during visit this day.      Patient reported symptoms: Difficulty losing weight    Anthropometrics:  Height:   Ht Readings from Last 1 Encounters:   02/15/24 1.651 m (5' 5\")      Weight:   Wt Readings from Last 10 Encounters:   02/15/24 107 kg (236 lb)   02/12/24 107 kg (236 lb 6.4 oz)   07/12/23 109 kg (241 lb)   03/06/23 107 kg (235 lb)   02/16/23 106 kg (233 lb)   07/06/22 102 kg (225 lb 4 oz)   05/11/22 102 kg (224 lb 8 oz)   04/21/22 104 kg (229 lb 2 oz)   01/14/22 102 kg (225 lb 3 oz)   12/23/21 102 kg (224 lb)      Current BMI:   BMI Readings from Last 1 Encounters:   02/15/24 39.27 kg/m²        Labs:  Lab Results   Component Value Date    HGBA1C 6.2 (A) 02/13/2023      Lab Results   Component Value Date    CHOL 142 02/13/2023    CHOL 149 12/23/2021    CHOL 148 12/14/2020     Lab Results   Component Value Date    HDL 70.8 02/13/2023    HDL 67.3 12/23/2021    HDL 68.3 12/14/2020     No results found for: \"LDLCALC\"  Lab Results   Component Value Date    TRIG 154 (H) 02/13/2023    TRIG 102 12/23/2021    TRIG 112 12/14/2020       Malnutrition Screening:  Significant Unintentional weight loss: No  Eating less than 75% of usual intake for " more than 2 weeks: No  Potential Signs of Inflammation: No    Recommended Malnutrition Diagnosis: No malnutrition identified    Diet Recall-  Breakfast- English muffin (Sourdough) with egg beaters and butter and sometimes a small amount of cheese   Lunch- sandwich (white bread with ham and swiss) with chips OR large salad with crispy wantons OR dried pepper chips along with dressing (Creamy Italian) and cheese  Dinner- various proteins (chicken, beef, pork), fruit and starches such as potato    Daily Snacks- cheese, cookies, granola bar, crispy cheese strips   Beverages- coffee in am with coffee mate, water during the day (16 ounces daily) and one Coke Zero daily   Alcohol- very rare  Physical Activity- None at this time with exception of walking with the dogs    Other pertinent patient reported Information:  - Past weight loss attempts include: Noom- lost 15 lbs but since gained it back    - Participates in dog shows and has a goal to reduce weight for overall health but also for increased mobility to participate in dog shows easier    Nutrition Diagnosis: Food and nutrition-related knowledge deficit related to lack of prior exposure to information as evidenced by BMI above normative standard for age and gender.    Readiness to Learn:  Cognitive ability: Alert and oriented  Motivation to learn: Interested  Family Support: Unable to assess- family not present  Instruction provided to: Patient  Patient learns best by: Multiple methods  Factors affecting learning: None   Physical limitations affecting learning: None    Education Materials Provided:   Your Mediterranean Meal Plan Booklet provided during recent visit with Dr. Papa Cruz and reviewed during visit this day.    Nutrition Interventions/Recommendations for 2/15/2024:  - Please refer to your book entitled: Your Mediterranean Meal Plan, and follow Mediterranean guidelines as discussed.  - The Healthy Plate style of eating can be a helpful tool for  incorporating healthy balanced meals in appropriate portions. (Healthy Plate: Start with a 9-inch diameter plate. Fill 1/2 the plate with non-starchy vegetables, 1/4 of the plate with whole grains or starchy vegetables, and 1/4 of the plate with a lean source of protein.   - Consider pre-planning healthy meals and snacks for the week. Refer to book for menus and recipes to get you started.  - Incorporate strategies of mindful eating every day. Practice staying in tune with your body's hunger cues and eat only when truly hungry. Avoid emotional eating/eating when not hungry.  - Aim for 48-64 ounces of water daily.  - Aim for 150 minutes of moderate-intensity physical activity per week as an ultimate goal but start off with twice weekly for 10-15 minutes with either the recumbent bike or with You Tube chair exercise videos.  - Follow-up as scheduled for the group classes with Dr. Papa Cruz.  - Follow-up with nutrition on 4/11/23 as scheduled.      Nutrition Monitoring & Evaluation: Weight loss of 1-2 lbs per week and adherence to recommendations and patient stated goals    Need for follow-up: As scheduled for Dr. Gallegos's Shared Medical Appointment (SMA) and as scheduled for individual nutrition appointment.    Referred by: Dr. Papa Cruz    MNT Billing Type: Medical Nutrition Assessment, each 15 min increment, for 4 increments.    SIGNATURE:   Deandra Snow RD, LD, [facility]                                                             DATE:   2/15/2024

## 2024-02-15 NOTE — PATIENT INSTRUCTIONS
- Please refer to your book entitled: Your Mediterranean Meal Plan, and follow Mediterranean guidelines as discussed.  - The Healthy Plate style of eating can be a helpful tool for incorporating healthy balanced meals in appropriate portions. (Healthy Plate: Start with a 9-inch diameter plate. Fill 1/2 the plate with non-starchy vegetables, 1/4 of the plate with whole grains or starchy vegetables, and 1/4 of the plate with a lean source of protein.   - Consider pre-planning healthy meals and snacks for the week. Refer to book for menus and recipes to get you started.  - Incorporate strategies of mindful eating every day. Practice staying in tune with your body's hunger cues and eat only when truly hungry. Avoid emotional eating/eating when not hungry.  - Aim for 48-64 ounces of water daily.  - Aim for 150 minutes of moderate-intensity physical activity per week as an ultimate goal but start off with twice weekly for 10-15 minutes with either the recumbent bike or with You Tube chair exercise videos.  - Follow-up as scheduled for the group classes with Dr. Papa Cruz.  - Follow-up with nutrition on 4/11/23 as scheduled.

## 2024-02-15 NOTE — TELEPHONE ENCOUNTER
Mounjaro denied   Insurance requires patient to try and fail two preferred medications. The following medications are bydureon, byetta, ozempic, rybelsus, trulicity and victoza.     Ozempic 0.25mg pended

## 2024-02-16 NOTE — TELEPHONE ENCOUNTER
Left patient a detailed message notifying that Mounjaro was denied, provider sent Ozempic to the pharmacy. Asked that patient contact our office if she had any further questions.

## 2024-02-20 ENCOUNTER — LAB (OUTPATIENT)
Dept: LAB | Facility: LAB | Age: 74
End: 2024-02-20
Payer: MEDICARE

## 2024-02-20 DIAGNOSIS — I10 PRIMARY HYPERTENSION: ICD-10-CM

## 2024-02-20 DIAGNOSIS — Z00.00 ROUTINE HEALTH MAINTENANCE: ICD-10-CM

## 2024-02-20 LAB
25(OH)D3 SERPL-MCNC: 30 NG/ML (ref 30–100)
ALBUMIN SERPL BCP-MCNC: 4.3 G/DL (ref 3.4–5)
ALP SERPL-CCNC: 47 U/L (ref 33–136)
ALT SERPL W P-5'-P-CCNC: 14 U/L (ref 7–45)
ANION GAP SERPL CALC-SCNC: 15 MMOL/L (ref 10–20)
APPEARANCE UR: CLEAR
AST SERPL W P-5'-P-CCNC: 17 U/L (ref 9–39)
BASOPHILS # BLD AUTO: 0.09 X10*3/UL (ref 0–0.1)
BASOPHILS NFR BLD AUTO: 1.5 %
BILIRUB SERPL-MCNC: 0.9 MG/DL (ref 0–1.2)
BILIRUB UR STRIP.AUTO-MCNC: NEGATIVE MG/DL
BUN SERPL-MCNC: 23 MG/DL (ref 6–23)
CALCIUM SERPL-MCNC: 9.9 MG/DL (ref 8.6–10.6)
CHLORIDE SERPL-SCNC: 103 MMOL/L (ref 98–107)
CHOLEST SERPL-MCNC: 139 MG/DL (ref 0–199)
CHOLESTEROL/HDL RATIO: 2
CO2 SERPL-SCNC: 27 MMOL/L (ref 21–32)
COLOR UR: NORMAL
CREAT SERPL-MCNC: 0.95 MG/DL (ref 0.5–1.05)
CRP SERPL HS-MCNC: 3.7 MG/L
EGFRCR SERPLBLD CKD-EPI 2021: 63 ML/MIN/1.73M*2
EOSINOPHIL # BLD AUTO: 0.22 X10*3/UL (ref 0–0.4)
EOSINOPHIL NFR BLD AUTO: 3.6 %
ERYTHROCYTE [DISTWIDTH] IN BLOOD BY AUTOMATED COUNT: 12.3 % (ref 11.5–14.5)
EST. AVERAGE GLUCOSE BLD GHB EST-MCNC: 140 MG/DL
GLUCOSE SERPL-MCNC: 153 MG/DL (ref 74–99)
GLUCOSE UR STRIP.AUTO-MCNC: NORMAL MG/DL
HBA1C MFR BLD: 6.5 %
HCT VFR BLD AUTO: 37.1 % (ref 36–46)
HDLC SERPL-MCNC: 69.2 MG/DL
HGB BLD-MCNC: 12.3 G/DL (ref 12–16)
IMM GRANULOCYTES # BLD AUTO: 0.03 X10*3/UL (ref 0–0.5)
IMM GRANULOCYTES NFR BLD AUTO: 0.5 % (ref 0–0.9)
KETONES UR STRIP.AUTO-MCNC: NEGATIVE MG/DL
LDLC SERPL CALC-MCNC: 44 MG/DL
LEUKOCYTE ESTERASE UR QL STRIP.AUTO: NEGATIVE
LYMPHOCYTES # BLD AUTO: 1.92 X10*3/UL (ref 0.8–3)
LYMPHOCYTES NFR BLD AUTO: 31.2 %
MCH RBC QN AUTO: 31.8 PG (ref 26–34)
MCHC RBC AUTO-ENTMCNC: 33.2 G/DL (ref 32–36)
MCV RBC AUTO: 96 FL (ref 80–100)
MONOCYTES # BLD AUTO: 0.67 X10*3/UL (ref 0.05–0.8)
MONOCYTES NFR BLD AUTO: 10.9 %
NEUTROPHILS # BLD AUTO: 3.23 X10*3/UL (ref 1.6–5.5)
NEUTROPHILS NFR BLD AUTO: 52.3 %
NITRITE UR QL STRIP.AUTO: NEGATIVE
NON HDL CHOLESTEROL: 70 MG/DL (ref 0–149)
NRBC BLD-RTO: 0 /100 WBCS (ref 0–0)
PH UR STRIP.AUTO: 5 [PH]
PLATELET # BLD AUTO: 337 X10*3/UL (ref 150–450)
POTASSIUM SERPL-SCNC: 4.5 MMOL/L (ref 3.5–5.3)
PROT SERPL-MCNC: 6.9 G/DL (ref 6.4–8.2)
PROT UR STRIP.AUTO-MCNC: NEGATIVE MG/DL
RBC # BLD AUTO: 3.87 X10*6/UL (ref 4–5.2)
RBC # UR STRIP.AUTO: NEGATIVE /UL
SODIUM SERPL-SCNC: 140 MMOL/L (ref 136–145)
SP GR UR STRIP.AUTO: 1.02
TRIGL SERPL-MCNC: 127 MG/DL (ref 0–149)
TSH SERPL-ACNC: 1.36 MIU/L (ref 0.44–3.98)
UROBILINOGEN UR STRIP.AUTO-MCNC: NORMAL MG/DL
VLDL: 25 MG/DL (ref 0–40)
WBC # BLD AUTO: 6.2 X10*3/UL (ref 4.4–11.3)

## 2024-02-20 PROCEDURE — 84443 ASSAY THYROID STIM HORMONE: CPT

## 2024-02-20 PROCEDURE — 85025 COMPLETE CBC W/AUTO DIFF WBC: CPT

## 2024-02-20 PROCEDURE — 82306 VITAMIN D 25 HYDROXY: CPT

## 2024-02-20 PROCEDURE — 80061 LIPID PANEL: CPT

## 2024-02-20 PROCEDURE — 80053 COMPREHEN METABOLIC PANEL: CPT

## 2024-02-20 PROCEDURE — 83036 HEMOGLOBIN GLYCOSYLATED A1C: CPT

## 2024-02-20 PROCEDURE — 81003 URINALYSIS AUTO W/O SCOPE: CPT

## 2024-02-20 PROCEDURE — 86141 C-REACTIVE PROTEIN HS: CPT

## 2024-02-20 PROCEDURE — 36415 COLL VENOUS BLD VENIPUNCTURE: CPT

## 2024-02-21 RX ORDER — LISINOPRIL 20 MG/1
20 TABLET ORAL DAILY
Qty: 90 TABLET | Refills: 3 | Status: SHIPPED | OUTPATIENT
Start: 2024-02-21

## 2024-02-28 NOTE — PROGRESS NOTES
Physical Exam    Name Rachael Shah    Date of Service :2/29/2024      Rachael Shah  73 y.o. is here for an annual physical exam  Past medical history significant for  Hypertension for many years has had adequate control with her current regimen  Hypercholesteremia remains on rosuvastatin with good success  Paroxysmal atrial fibrillation and atrial tachycardia was chemically converted however converted back to A-fib has had 2 separate ablations remains in A-fib but believes she has had some intermittent atrial fibrillation over time she does follow routinely with EP  Dr Best and is on anticoagulation with Xarelto and also metoprolol for rate control  Severe DJD had right knee replacement left shoulder replacement  Diabetes has been able to lose weight has been on metformin has continued to struggle and recently has seen endocrinology Dr. 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 No pertinent surgical history.     Social History     Tobacco Use    Smoking status: Former     Years: 15     Types: Cigarettes    Smokeless tobacco: Never   Vaping Use    Vaping Use:  "Never used        Social History     Social History Narrative    Originally from [facility]    Went to [facility] worked in healthcare  for [employer] for a while moved to [place] to be near [facility].  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     to her  for over 50 years    No children    Diet is okay    Does not exercise routinely but does have an active lifestyle    Previous smoker but more than 30 years ago    Rare alcohol       Family History   Problem Relation Name Age of Onset    COPD Mother          livrd until 89    Hypertension Mother      Heart attack Mother      Dementia Father          lived until 85    Other (gastrointestinal bleeding) Father      Other (Joint pain) Father      Prostate cancer Father          /64   Ht 1.676 m (5' 6\")   Wt 106 kg (233 lb)   BMI 37.61 kg/m²     Physical Exam  Physical examination  Reveals a well-developed woman in no acute distress    appearance is age appropriate overweight  HEENT exam  Extraocular motion is intact  Tympanic membranes and external auditory canals are normal  Oropharynx is normal  There is no cervical lymphadenopathy appreciated  The thyroid is within normal limits    Lungs    clear to auscultation and percussion    Cardiovascular   regular rate and rhythm  No murmurs rubs or gallops are appreciated    Breast examination   No dominant masses nipple discharge or axillary lymphadenopathy is appreciated    Abdomen   soft nontender bowel sounds are positive   there is no organomegaly noted      Periphery  Pulses are present without deficits noted  No peripheral edema is noted    Musculoskeletal  Gait is normal  Is no joint erythema or swelling noted  Range of motion is within normal limits  Strength is 5 of 5 without deficits noted    Dermatology  No concerning skin lesions are noted    Neurology  No deficits are noted  Judgment appears appropriate  Mood and affect are " "appropriate                        No lab exists for component: \"LABALBU\"     No components found for: \"UA\"  Lab on 02/20/2024   Component Date Value Ref Range Status    WBC 02/20/2024 6.2  4.4 - 11.3 x10*3/uL Final    nRBC 02/20/2024 0.0  0.0 - 0.0 /100 WBCs Final    RBC 02/20/2024 3.87 (L)  4.00 - 5.20 x10*6/uL Final    Hemoglobin 02/20/2024 12.3  12.0 - 16.0 g/dL Final    Hematocrit 02/20/2024 37.1  36.0 - 46.0 % Final    MCV 02/20/2024 96  80 - 100 fL Final    MCH 02/20/2024 31.8  26.0 - 34.0 pg Final    MCHC 02/20/2024 33.2  32.0 - 36.0 g/dL Final    RDW 02/20/2024 12.3  11.5 - 14.5 % Final    Platelets 02/20/2024 337  150 - 450 x10*3/uL Final    Neutrophils % 02/20/2024 52.3  40.0 - 80.0 % Final    Immature Granulocytes %, Automated 02/20/2024 0.5  0.0 - 0.9 % Final    Immature Granulocyte Count (IG) includes promyelocytes, myelocytes and metamyelocytes but does not include bands. Percent differential counts (%) should be interpreted in the context of the absolute cell counts (cells/UL).    Lymphocytes % 02/20/2024 31.2  13.0 - 44.0 % Final    Monocytes % 02/20/2024 10.9  2.0 - 10.0 % Final    Eosinophils % 02/20/2024 3.6  0.0 - 6.0 % Final    Basophils % 02/20/2024 1.5  0.0 - 2.0 % Final    Neutrophils Absolute 02/20/2024 3.23  1.60 - 5.50 x10*3/uL Final    Percent differential counts (%) should be interpreted in the context of the absolute cell counts (cells/uL).    Immature Granulocytes Absolute, Au* 02/20/2024 0.03  0.00 - 0.50 x10*3/uL Final    Lymphocytes Absolute 02/20/2024 1.92  0.80 - 3.00 x10*3/uL Final    Monocytes Absolute 02/20/2024 0.67  0.05 - 0.80 x10*3/uL Final    Eosinophils Absolute 02/20/2024 0.22  0.00 - 0.40 x10*3/uL Final    Basophils Absolute 02/20/2024 0.09  0.00 - 0.10 x10*3/uL Final    Glucose 02/20/2024 153 (H)  74 - 99 mg/dL Final    Sodium 02/20/2024 140  136 - 145 mmol/L Final    Potassium 02/20/2024 4.5  3.5 - 5.3 mmol/L Final    Chloride 02/20/2024 103  98 - 107 mmol/L Final    " Bicarbonate 02/20/2024 27  21 - 32 mmol/L Final    Anion Gap 02/20/2024 15  10 - 20 mmol/L Final    Urea Nitrogen 02/20/2024 23  6 - 23 mg/dL Final    Creatinine 02/20/2024 0.95  0.50 - 1.05 mg/dL Final    eGFR 02/20/2024 63  >60 mL/min/1.73m*2 Final    Calculations of estimated GFR are performed using the 2021 CKD-EPI Study Refit equation without the race variable for the IDMS-Traceable creatinine methods.  https://jasn.asnjournals.org/content/early/2021/09/22/ASN.7549011080    Calcium 02/20/2024 9.9  8.6 - 10.6 mg/dL Final    Albumin 02/20/2024 4.3  3.4 - 5.0 g/dL Final    Alkaline Phosphatase 02/20/2024 47  33 - 136 U/L Final    Total Protein 02/20/2024 6.9  6.4 - 8.2 g/dL Final    AST 02/20/2024 17  9 - 39 U/L Final    Bilirubin, Total 02/20/2024 0.9  0.0 - 1.2 mg/dL Final    ALT 02/20/2024 14  7 - 45 U/L Final    Patients treated with Sulfasalazine may generate falsely decreased results for ALT.    CRP, High Sensitivity 02/20/2024 3.7 (H)  <1.0 mg/L Final    Hemoglobin A1C 02/20/2024 6.5 (H)  see below % Final    Estimated Average Glucose 02/20/2024 140  Not Established mg/dL Final    Cholesterol 02/20/2024 139  0 - 199 mg/dL Final          Age      Desirable   Borderline High   High     0-19 Y     0 - 169       170 - 199     >/= 200    20-24 Y     0 - 189       190 - 224     >/= 225         >24 Y     0 - 199       200 - 239     >/= 240   **All ranges are based on fasting samples. Specific   therapeutic targets will vary based on patient-specific   cardiac risk.    Pediatric guidelines reference:Pediatrics 2011, 128(S5).Adult guidelines reference: NCEP ATPIII Guidelines,RUKHSANA 2001, 258:2486-97    Venipuncture immediately after or during the administration of Metamizole may lead to falsely low results. Testing should be performed immediately prior to Metamizole dosing.    HDL-Cholesterol 02/20/2024 69.2  mg/dL Final      Age       Very Low   Low     Normal    High    0-19 Y    < 35      < 40     40-45      ----  20-24 Y    ----     < 40      >45      ----        >24 Y      ----     < 40     40-60      >60      Cholesterol/HDL Ratio 02/20/2024 2.0   Final      Ref Values  Desirable  < 3.4  High Risk  > 5.0    LDL Calculated 02/20/2024 44  <=99 mg/dL Final                                Near   Borderline      AGE      Desirable  Optimal    High     High     Very High     0-19 Y     0 - 109     ---    110-129   >/= 130     ----    20-24 Y     0 - 119     ---    120-159   >/= 160     ----      >24 Y     0 -  99   100-129  130-159   160-189     >/=190      VLDL 02/20/2024 25  0 - 40 mg/dL Final    Triglycerides 02/20/2024 127  0 - 149 mg/dL Final       Age         Desirable   Borderline High   High     Very High   0 D-90 D    19 - 174         ----         ----        ----  91 D- 9 Y     0 -  74        75 -  99     >/= 100      ----    10-19 Y     0 -  89        90 - 129     >/= 130      ----    20-24 Y     0 - 114       115 - 149     >/= 150      ----         >24 Y     0 - 149       150 - 199    200- 499    >/= 500    Venipuncture immediately after or during the administration of Metamizole may lead to falsely low results. Testing should be performed immediately prior to Metamizole dosing.    Non HDL Cholesterol 02/20/2024 70  0 - 149 mg/dL Final          Age       Desirable   Borderline High   High     Very High     0-19 Y     0 - 119       120 - 144     >/= 145    >/= 160    20-24 Y     0 - 149       150 - 189     >/= 190      ----         >24 Y    30 mg/dL above LDL Cholesterol goal      Thyroid Stimulating Hormone 02/20/2024 1.36  0.44 - 3.98 mIU/L Final    Vitamin D, 25-Hydroxy, Total 02/20/2024 30  30 - 100 ng/mL Final    Color, Urine 02/20/2024 Light-Yellow  Light-Yellow, Yellow, Dark-Yellow Final    Appearance, Urine 02/20/2024 Clear  Clear Final    Specific Gravity, Urine 02/20/2024 1.017  1.005 - 1.035 Final    pH, Urine 02/20/2024 5.0  5.0, 5.5, 6.0, 6.5, 7.0, 7.5, 8.0 Final    Protein, Urine 02/20/2024 NEGATIVE   NEGATIVE, 10 (TRACE), 20 (TRACE) mg/dL Final    Glucose, Urine 02/20/2024 Normal  Normal mg/dL Final    Blood, Urine 02/20/2024 NEGATIVE  NEGATIVE Final    Ketones, Urine 02/20/2024 NEGATIVE  NEGATIVE mg/dL Final    Bilirubin, Urine 02/20/2024 NEGATIVE  NEGATIVE Final    Urobilinogen, Urine 02/20/2024 Normal  Normal mg/dL Final    Nitrite, Urine 02/20/2024 NEGATIVE  NEGATIVE Final    Leukocyte Esterase, Urine 02/20/2024 NEGATIVE  NEGATIVE Final     [unfilled]   No results found.   The ASCVD Risk score (Flynn BLAS, et al., 2019) failed to calculate for the following reasons:    The patient has a prior MI or stroke diagnosis   .    Problem List Items Addressed This Visit       Hyperlipidemia    Paroxysmal atrial fibrillation (CMS/HCC)     Other Visit Diagnoses       Encounter for screening mammogram for malignant neoplasm of breast    -  Primary    Relevant Orders    BI mammo bilateral screening tomosynthesis    Asymptomatic menopausal state        Relevant Orders    XR DEXA bone density            Assessment/Plan   1 paroxysmal atrial fibrillation has not had any events for about 3 years she does well she follows routinely with cardiology  Dr Best   2 hypertension great control continue current regimen  3.  Hypercholesteremia continue Crestor  4.  Recurrent ear infections markedly improved since shelter is planning to go to [place] for a dog show discussed using Afrin just before flying  5.  Diabetes now seeing endocrinology on metformin we will start Ozempic when she returns from [place]  6.  Obesity diet exercise strongly urged hopefully the Ozempic will be great help to her as well  7.  Allergic rhinitis she is using Astelin nasal spray  8.  Health maintenance  Increased routine regular exercise is encouraged  Mammogram requisition was given  Bone density requisition was placed  Up-to-date with immunizations consider RSV next year  Colonoscopy 2025  Really seems to be doing well overall

## 2024-02-29 ENCOUNTER — OFFICE VISIT (OUTPATIENT)
Dept: PRIMARY CARE | Facility: CLINIC | Age: 74
End: 2024-02-29
Payer: MEDICARE

## 2024-02-29 VITALS
DIASTOLIC BLOOD PRESSURE: 64 MMHG | BODY MASS INDEX: 37.45 KG/M2 | WEIGHT: 233 LBS | SYSTOLIC BLOOD PRESSURE: 112 MMHG | HEIGHT: 66 IN

## 2024-02-29 VITALS
DIASTOLIC BLOOD PRESSURE: 64 MMHG | WEIGHT: 233 LBS | BODY MASS INDEX: 37.45 KG/M2 | SYSTOLIC BLOOD PRESSURE: 112 MMHG | HEIGHT: 66 IN

## 2024-02-29 DIAGNOSIS — E78.00 PURE HYPERCHOLESTEROLEMIA: Primary | ICD-10-CM

## 2024-02-29 DIAGNOSIS — Z12.31 ENCOUNTER FOR SCREENING MAMMOGRAM FOR MALIGNANT NEOPLASM OF BREAST: Primary | ICD-10-CM

## 2024-02-29 DIAGNOSIS — E78.00 PURE HYPERCHOLESTEROLEMIA: ICD-10-CM

## 2024-02-29 DIAGNOSIS — I48.0 PAROXYSMAL ATRIAL FIBRILLATION (MULTI): ICD-10-CM

## 2024-02-29 DIAGNOSIS — Z78.0 ASYMPTOMATIC MENOPAUSAL STATE: ICD-10-CM

## 2024-02-29 PROCEDURE — UHSPHYS PR UH SELECT PHYSICAL: Performed by: INTERNAL MEDICINE

## 2024-02-29 PROCEDURE — 1157F ADVNC CARE PLAN IN RCRD: CPT | Performed by: INTERNAL MEDICINE

## 2024-02-29 PROCEDURE — 3078F DIAST BP <80 MM HG: CPT | Performed by: INTERNAL MEDICINE

## 2024-02-29 PROCEDURE — G0439 PPPS, SUBSEQ VISIT: HCPCS | Performed by: INTERNAL MEDICINE

## 2024-02-29 PROCEDURE — 1159F MED LIST DOCD IN RCRD: CPT | Performed by: INTERNAL MEDICINE

## 2024-02-29 PROCEDURE — 1160F RVW MEDS BY RX/DR IN RCRD: CPT | Performed by: INTERNAL MEDICINE

## 2024-02-29 PROCEDURE — 4010F ACE/ARB THERAPY RXD/TAKEN: CPT | Performed by: INTERNAL MEDICINE

## 2024-02-29 PROCEDURE — 1036F TOBACCO NON-USER: CPT | Performed by: INTERNAL MEDICINE

## 2024-02-29 PROCEDURE — 1126F AMNT PAIN NOTED NONE PRSNT: CPT | Performed by: INTERNAL MEDICINE

## 2024-02-29 PROCEDURE — 3074F SYST BP LT 130 MM HG: CPT | Performed by: INTERNAL MEDICINE

## 2024-02-29 PROCEDURE — 3008F BODY MASS INDEX DOCD: CPT | Performed by: INTERNAL MEDICINE

## 2024-02-29 PROCEDURE — 3044F HG A1C LEVEL LT 7.0%: CPT | Performed by: INTERNAL MEDICINE

## 2024-02-29 PROCEDURE — 3048F LDL-C <100 MG/DL: CPT | Performed by: INTERNAL MEDICINE

## 2024-02-29 ASSESSMENT — ACTIVITIES OF DAILY LIVING (ADL)
DOING_HOUSEWORK: INDEPENDENT
TAKING_MEDICATION: INDEPENDENT
MANAGING_FINANCES: INDEPENDENT
GROCERY_SHOPPING: INDEPENDENT

## 2024-02-29 ASSESSMENT — PATIENT HEALTH QUESTIONNAIRE - PHQ9
2. FEELING DOWN, DEPRESSED OR HOPELESS: NOT AT ALL
1. LITTLE INTEREST OR PLEASURE IN DOING THINGS: NOT AT ALL
SUM OF ALL RESPONSES TO PHQ9 QUESTIONS 1 AND 2: 0

## 2024-02-29 NOTE — PATIENT INSTRUCTIONS
It was good to see you.  You are doing a good job with your overall health care.  Lab work is all fine except for glucose and hemoglobin A1c.  I am so glad you are seeing endocrinology and think Ozempic is a great agent for you  As you are aware you would benefit from weight loss from the diabetic standpoint as well as cardiovascular standpoint.  Hopefully the Ozempic will be a great aid in these regards  I recommend using Afrin prior to flying which should help to prevent ear infections   mammogram requisition was placed  Bone density requisition has been placed  Routine regular exercise is recommended. American Cardiology Association recommended 100-120 mins weekly of aerobic exercise (exercise that increases your heart rate). In addition you should add resistance training (lifting weights/etc.).      Due for colonoscopy in 2025  Up-to-date with immunizations consider RSV in future    I encourage you to stay active and healthy, and to follow these healthy habits:     Try to increase your intake of fish such as salmon and tuna and try to get 2 to 3 servings of fish per week.  Increase your intake of plant-based protein listed here:    Unprocessed nuts, walnuts, or almonds, Nuts and Seeds.      Green vegetables such as Broccoli, Peas, Greens, Spinach    Beans, Chickpeas, & Lentils    Other sources include Potatoes, Quinoa, Seaweed, Soymilk, Tempeh, and Tofu.  Increase food s higher in flavonoids found in black tea, green tea, apples, nuts, citrus fruit, berries, and dark chocolate.  You should avoid fried foods, and sugary or starchy foods and sugary drinks, and avoid saturated fats.    Try not to dine at restaurants frequently and avoid fast food restaurants.      Try to get restful sleep approximately 7-9 hours every night.  Work towards getting 30 minutes of  moderate intensity exercise 4 to 5 days per week.  You should also try to exercise at least one hour per day with light walking.

## 2024-04-01 DIAGNOSIS — B36.9 FUNGAL DERMATITIS: Primary | ICD-10-CM

## 2024-04-01 RX ORDER — NYSTATIN 100000 [USP'U]/G
POWDER TOPICAL
COMMUNITY
Start: 2017-05-23 | End: 2024-04-01 | Stop reason: SDUPTHER

## 2024-04-01 RX ORDER — NYSTATIN 100000 [USP'U]/G
POWDER TOPICAL
Qty: 120 G | Refills: 3 | Status: SHIPPED | OUTPATIENT
Start: 2024-04-01

## 2024-04-04 ENCOUNTER — TELEPHONE (OUTPATIENT)
Dept: PRIMARY CARE | Facility: CLINIC | Age: 74
End: 2024-04-04
Payer: MEDICARE

## 2024-04-04 NOTE — TELEPHONE ENCOUNTER
The last 2 days her sugar has been in 50's early in the morning. She is on Ozempic and Metformin should she continue both or what to do?

## 2024-04-11 ENCOUNTER — TELEMEDICINE CLINICAL SUPPORT (OUTPATIENT)
Dept: ENDOCRINOLOGY | Facility: CLINIC | Age: 74
End: 2024-04-11
Payer: MEDICARE

## 2024-04-11 VITALS — HEIGHT: 66 IN | WEIGHT: 222 LBS | BODY MASS INDEX: 35.68 KG/M2

## 2024-04-11 DIAGNOSIS — Z71.3 DIETARY COUNSELING: Primary | ICD-10-CM

## 2024-04-11 DIAGNOSIS — E11.9 TYPE 2 DIABETES MELLITUS WITHOUT COMPLICATION, WITHOUT LONG-TERM CURRENT USE OF INSULIN (MULTI): ICD-10-CM

## 2024-04-11 PROCEDURE — 97803 MED NUTRITION INDIV SUBSEQ: CPT | Performed by: DIETITIAN, REGISTERED

## 2024-04-11 NOTE — PATIENT INSTRUCTIONS
- Continue to pre-plan healthy meals and snacks for the week following the Mediterranean diet guidelines and continue to refer to the book for menus and recipe. Further recipes can also be found at: [email or web address]  - Aim for 48-64 ounces of water daily.  - Aim for 150 minutes of moderate-intensity physical activity per week as an ultimate goal but start off with twice weekly for 10-15 minutes with either the recumbent bike or with You Tube chair exercise videos.  - Follow-up as scheduled for the group classes with Dr. Papa Cruz in June.

## 2024-04-11 NOTE — PROGRESS NOTES
Follow-up Nutrition Assessment    Interval History: Patient presents for follow-up nutrition appointment for weight management/desire to lose weight, as well as for consideration for Type 2 DM. Since last nutrition visit on 2/15/24, pt with a 14 lbs weight loss. Has made significant dietary changes as well as remains on medication Ozempic. Increasing lean proteins and vegetables, replaced white starches with whole wheat varieties and reducing overall portions of starches. Incorporating healthier snacks and eliminated less healthy snacks. Fluids still meeting low-end of recommendations but getting better by report with increasing water intakes. Walking for activity but would likely benefit from the addition of structured weekly activity to assist in achieving goals. See all interventions/recommendations below as discussed during visit this day.     Nutrition Interventions/Recommendations from last visit scheduled on 2/15/2024:  - Please refer to your book entitled: Your Mediterranean Meal Plan, and follow Mediterranean guidelines as discussed.  - The Healthy Plate style of eating can be a helpful tool for incorporating healthy balanced meals in appropriate portions. (Healthy Plate: Start with a 9-inch diameter plate. Fill 1/2 the plate with non-starchy vegetables, 1/4 of the plate with whole grains or starchy vegetables, and 1/4 of the plate with a lean source of protein.   - Consider pre-planning healthy meals and snacks for the week. Refer to book for menus and recipes to get you started.  - Incorporate strategies of mindful eating every day. Practice staying in tune with your body's hunger cues and eat only when truly hungry. Avoid emotional eating/eating when not hungry.  - Aim for 48-64 ounces of water daily.  - Aim for 150 minutes of moderate-intensity physical activity per week as an ultimate goal but start off with twice weekly for 10-15 minutes with either the recumbent bike or with You Tube chair exercise  "videos.  - Follow-up as scheduled for the group classes with Dr. Papa Cruz.  - Follow-up with nutrition on 4/11/23 as scheduled.      Anthropometrics:  Height:   Ht Readings from Last 1 Encounters:   02/29/24 1.676 m (5' 6\")      Weight:   Wt Readings from Last 10 Encounters:   02/29/24 106 kg (233 lb)   02/29/24 106 kg (233 lb)   02/15/24 107 kg (236 lb)   02/12/24 107 kg (236 lb 6.4 oz)   07/12/23 109 kg (241 lb)   03/06/23 107 kg (235 lb)   02/16/23 106 kg (233 lb)   07/06/22 102 kg (225 lb 4 oz)   05/11/22 102 kg (224 lb 8 oz)   04/21/22 104 kg (229 lb 2 oz)      Current BMI:   BMI Readings from Last 1 Encounters:   02/29/24 37.61 kg/m²        Malnutrition Screening:  Significant Unintentional weight loss: No  Eating less than 75% of usual intake for more than 2 weeks: No  Potential Signs of Inflammation: No    Recommended Malnutrition Diagnosis: No malnutrition identified    Diet Recall-  Breakfast- whole wheat bread or wraps with egg beaters and sometimes cheese  Lunch- salads with a small amount of cheese and Italian dressing and chicken OR home made chicken burrito on a whole wheat wraps  Dinner- chicken and roasted vegetables OR salmon and vegetables and sometimes a small amount of potatoes and a fruit on the side OR a soup (Mediterranean Book recipe)  Snacks- apple with 1 Tbsp PB OR whole wheat crackers and cheese  Beverages- coffee in am with coffee mate, water during the day (24 ounces daily) and one Coke Zero daily   Physical Activity- increased walking    Other pertinent patient reported Information:  - Still taking the Ozempic and feels it is working well.  - Pleased with weight loss to date.  - Has made significant changes with diet and feels great about such.     Nutrition Diagnosis: Food and nutrition-related knowledge deficit related to lack of prior exposure to information as evidenced by BMI above normative standard for age and gender.     Readiness to Learn:  Cognitive ability: Alert and " oriented  Motivation to learn: Interested  Family Support: Unable to assess- family not present  Instruction provided to: Patient  Patient learns best by: Multiple methods  Factors affecting learning: None   Physical limitations affecting learning: None    Education Materials Provided:   None this visit    Nutrition Interventions/Recommendations for 4/11/2024:  - Continue to pre-plan healthy meals and snacks for the week following the Mediterranean diet guidelines and continue to refer to the book for menus and recipe. Further recipes can also be found at: [email or web address]  - Aim for 48-64 ounces of water daily.  - Aim for 150 minutes of moderate-intensity physical activity per week as an ultimate goal but start off with twice weekly for 10-15 minutes with either the recumbent bike or with You Tube chair exercise videos.  - Follow-up as scheduled for the group classes with Dr. Papa Cruz in June.    Nutrition Monitoring & Evaluation: adherence to recommendations and patient stated goals    Need for follow-up: As scheduled for Dr. Gallegos's Shared Medical Appointment (SMA)    Referred by: Dr. Papa Cruz    MNT Billing Type: Medical Nutrition Re-Assessment, each 15 min increment, for 2 increments.    SIGNATURE:   Deandra Snow RD, LD, CDCES                                                             DATE:   4/11/2024

## 2024-04-12 ENCOUNTER — TELEPHONE (OUTPATIENT)
Dept: ENDOCRINOLOGY | Facility: CLINIC | Age: 74
End: 2024-04-12
Payer: MEDICARE

## 2024-04-12 DIAGNOSIS — E11.9 TYPE 2 DIABETES MELLITUS WITHOUT COMPLICATION, WITHOUT LONG-TERM CURRENT USE OF INSULIN (MULTI): ICD-10-CM

## 2024-04-12 NOTE — TELEPHONE ENCOUNTER
----- Message from Rachael Shah sent at 4/11/2024  9:56 AM EDT -----  Regarding: Ozempic dosage  Contact: [phone]  I have now completed one month on Ozempic at the 0.25mg dose level. No side effects noticed. Should I increase to the 0.5 mg level?

## 2024-04-12 NOTE — TELEPHONE ENCOUNTER
LOV: 2/12/24  NOV: 6/25/24  Patient has been on Ozempic 0.25mg for one month with no complications  Patient asking if she should go up to the next dose  Next dose pended below  Please review

## 2024-04-15 ENCOUNTER — HOSPITAL ENCOUNTER (OUTPATIENT)
Dept: RADIOLOGY | Facility: HOSPITAL | Age: 74
Discharge: HOME | End: 2024-04-15
Payer: MEDICARE

## 2024-04-15 VITALS — BODY MASS INDEX: 36.32 KG/M2 | HEIGHT: 66 IN | WEIGHT: 226 LBS

## 2024-04-15 DIAGNOSIS — Z78.0 ASYMPTOMATIC MENOPAUSAL STATE: ICD-10-CM

## 2024-04-15 DIAGNOSIS — Z12.31 ENCOUNTER FOR SCREENING MAMMOGRAM FOR MALIGNANT NEOPLASM OF BREAST: ICD-10-CM

## 2024-04-15 PROCEDURE — 77067 SCR MAMMO BI INCL CAD: CPT | Performed by: RADIOLOGY

## 2024-04-15 PROCEDURE — 77067 SCR MAMMO BI INCL CAD: CPT

## 2024-04-15 PROCEDURE — 77080 DXA BONE DENSITY AXIAL: CPT | Performed by: RADIOLOGY

## 2024-04-15 PROCEDURE — 77063 BREAST TOMOSYNTHESIS BI: CPT | Performed by: RADIOLOGY

## 2024-04-15 PROCEDURE — 77080 DXA BONE DENSITY AXIAL: CPT

## 2024-04-29 ENCOUNTER — OFFICE VISIT (OUTPATIENT)
Dept: PRIMARY CARE | Facility: CLINIC | Age: 74
End: 2024-04-29
Payer: MEDICARE

## 2024-04-29 ENCOUNTER — TELEPHONE (OUTPATIENT)
Dept: PRIMARY CARE | Facility: CLINIC | Age: 74
End: 2024-04-29

## 2024-04-29 VITALS
SYSTOLIC BLOOD PRESSURE: 96 MMHG | OXYGEN SATURATION: 98 % | TEMPERATURE: 97.7 F | DIASTOLIC BLOOD PRESSURE: 58 MMHG | HEART RATE: 70 BPM

## 2024-04-29 DIAGNOSIS — T78.40XA ALLERGIC REACTION, INITIAL ENCOUNTER: Primary | ICD-10-CM

## 2024-04-29 DIAGNOSIS — I95.89 OTHER SPECIFIED HYPOTENSION: ICD-10-CM

## 2024-04-29 PROCEDURE — 99213 OFFICE O/P EST LOW 20 MIN: CPT | Performed by: INTERNAL MEDICINE

## 2024-04-29 PROCEDURE — 3078F DIAST BP <80 MM HG: CPT | Performed by: INTERNAL MEDICINE

## 2024-04-29 PROCEDURE — 1160F RVW MEDS BY RX/DR IN RCRD: CPT | Performed by: INTERNAL MEDICINE

## 2024-04-29 PROCEDURE — 1036F TOBACCO NON-USER: CPT | Performed by: INTERNAL MEDICINE

## 2024-04-29 PROCEDURE — 3074F SYST BP LT 130 MM HG: CPT | Performed by: INTERNAL MEDICINE

## 2024-04-29 PROCEDURE — 4010F ACE/ARB THERAPY RXD/TAKEN: CPT | Performed by: INTERNAL MEDICINE

## 2024-04-29 PROCEDURE — 3044F HG A1C LEVEL LT 7.0%: CPT | Performed by: INTERNAL MEDICINE

## 2024-04-29 PROCEDURE — 3008F BODY MASS INDEX DOCD: CPT | Performed by: INTERNAL MEDICINE

## 2024-04-29 PROCEDURE — 3048F LDL-C <100 MG/DL: CPT | Performed by: INTERNAL MEDICINE

## 2024-04-29 PROCEDURE — 1159F MED LIST DOCD IN RCRD: CPT | Performed by: INTERNAL MEDICINE

## 2024-04-29 PROCEDURE — 1157F ADVNC CARE PLAN IN RCRD: CPT | Performed by: INTERNAL MEDICINE

## 2024-04-29 RX ORDER — PREDNISONE 10 MG/1
TABLET ORAL DAILY
Qty: 12 TABLET | Refills: 0 | Status: SHIPPED | OUTPATIENT
Start: 2024-04-29 | End: 2024-05-04

## 2024-04-29 NOTE — TELEPHONE ENCOUNTER
She is getting hives everywhere and wants to know which anti-histamine she can/ should take OTC?   06-Aug-2019 08:44

## 2024-04-29 NOTE — PATIENT INSTRUCTIONS
An allergic reaction to something.  At this time I am not sure what although as we discussed Ozempic was the last medication you started but it has been a while.  We are going to have you take antihistamine like Zyrtec or Xyzal routinely start prednisone taper starting with 30 mg decreasing by 10 mg every 2 days until complete.  Hopeful that the rash will just resolve if it does not or certainly if it comes back quickly need to consider making medication adjustments  Your blood pressure is low today.  Be sure to drink plenty of water.  I would like you to monitor your blood pressure at home and if your systolic blood pressure number remains 110 or less we may need to make adjustments to your blood pressure medications

## 2024-04-29 NOTE — PROGRESS NOTES
Subjective   Patient ID: Rachael Shah is a 73 y.o. female.    HPI  patient presents today with complaints of diffuse hives rash just 4 days ago    No new detergents  No new exposures that she is aware of  Medication she started was Ozempic but it is now been 6 weeks on the Ozempic  The itching started under her bra line  Has now become more diffuse in the inguinal area down her legs and on her forearms now  They do have animals at home but she checked everyone for fleas and her  has not had an issue either      Past medical history significant for  Atrial fibrillation  Hypertension  Hypercholesteremia  Diabetes now on Ozempic  Obstructive sleep apnea        Review of Systems    Objective   Physical Exam  Overweight no acute distress  HEENT exam the right eye is slightly swollen and erythematous  There is no rash present on her face  Lungs are clear to auscultation percussion  No stridor is noted  Cardiovascular regular rate and rhythm  Skin there is a rash under her breast just kind of macular papular but shiny as well  There is some rash under her umbilicus and into the inguinal area  There is a fine rash on her arms now as well    Assessment/Plan   #1 dermatitis which is consistent with an allergic reaction however not sure what this reaction is to she had started Ozempic about 6 weeks ago no other new exposures that she is aware of she has done well with the Ozempic otherwise at this time opted to have her take Zyrtec or Xyzal start prednisone taper at 30 mg decrease by 10 mg every 2 days until complete in hopes that the rash will just resolve if it does not or certainly if it returns would need to consider medication changes especially Ozempic being the last medication  2.  Hypotension she is asymptomatic she checks her blood pressure intermittently at home and it has been okay I have asked her to monitor her blood pressure if her systolic is consistently less than 110 we will need to make some  adjustment changes  She will increase her water intake as well  3.  Stress increased stress mostly with her friend Yudith

## 2024-06-19 DIAGNOSIS — I48.0 PAROXYSMAL ATRIAL FIBRILLATION (MULTI): ICD-10-CM

## 2024-06-20 RX ORDER — METOPROLOL SUCCINATE 25 MG/1
25 TABLET, EXTENDED RELEASE ORAL 2 TIMES DAILY
Qty: 180 TABLET | Refills: 3 | Status: SHIPPED | OUTPATIENT
Start: 2024-06-20

## 2024-06-20 NOTE — PROGRESS NOTES
Spoke with patient. Confirmed need for Metoprolol refill, pharmacy of choice ([facility]) as well as request for 90 day supply. Refill electronically submitted.

## 2024-06-25 ENCOUNTER — APPOINTMENT (OUTPATIENT)
Dept: ENDOCRINOLOGY | Facility: CLINIC | Age: 74
End: 2024-06-25
Payer: MEDICARE

## 2024-06-25 VITALS
DIASTOLIC BLOOD PRESSURE: 60 MMHG | HEIGHT: 66 IN | WEIGHT: 220.5 LBS | BODY MASS INDEX: 35.44 KG/M2 | HEART RATE: 64 BPM | SYSTOLIC BLOOD PRESSURE: 95 MMHG

## 2024-06-25 DIAGNOSIS — Z76.89 ENCOUNTER FOR WEIGHT MANAGEMENT: ICD-10-CM

## 2024-06-25 DIAGNOSIS — E11.9 TYPE 2 DIABETES MELLITUS WITHOUT COMPLICATION, WITHOUT LONG-TERM CURRENT USE OF INSULIN (MULTI): ICD-10-CM

## 2024-06-25 DIAGNOSIS — Z71.3 DIETARY COUNSELING: ICD-10-CM

## 2024-06-25 PROCEDURE — 99215 OFFICE O/P EST HI 40 MIN: CPT | Performed by: INTERNAL MEDICINE

## 2024-06-25 PROCEDURE — 3044F HG A1C LEVEL LT 7.0%: CPT | Performed by: INTERNAL MEDICINE

## 2024-06-25 PROCEDURE — 1157F ADVNC CARE PLAN IN RCRD: CPT | Performed by: INTERNAL MEDICINE

## 2024-06-25 PROCEDURE — 3074F SYST BP LT 130 MM HG: CPT | Performed by: INTERNAL MEDICINE

## 2024-06-25 PROCEDURE — 3078F DIAST BP <80 MM HG: CPT | Performed by: INTERNAL MEDICINE

## 2024-06-25 PROCEDURE — 3048F LDL-C <100 MG/DL: CPT | Performed by: INTERNAL MEDICINE

## 2024-06-25 PROCEDURE — 4010F ACE/ARB THERAPY RXD/TAKEN: CPT | Performed by: INTERNAL MEDICINE

## 2024-06-25 PROCEDURE — 1159F MED LIST DOCD IN RCRD: CPT | Performed by: INTERNAL MEDICINE

## 2024-06-25 PROCEDURE — 3008F BODY MASS INDEX DOCD: CPT | Performed by: INTERNAL MEDICINE

## 2024-06-25 NOTE — PROGRESS NOTES
Subjective   Rachael Shah is a 73 y.o. female with a hx of Afib (h/o multiple cardioversions and a few ablations), DM T2, HTN, ANTON, HLD who presents for weight management and obesity.    1. Nutrition: working on Mediterranean plan.     2. Sleep: Has ANTON- uses cpap      3. Stress: managed      4. Exercise: Using bands for upper body daily      5. Appetite control: controlled  AOM currently taking: Ozempic 0.5mg    6. Goal: continue using bands for upper arms and try water aerobics at the [facility]      Current Outpatient Medications:     azelastine (Astelin) 137 mcg (0.1 %) nasal spray, Administer 2 sprays into each nostril 2 times a day., Disp: , Rfl:     cholecalciferol (Vitamin D-3) 50 mcg (2,000 unit) capsule, Take 1 capsule (50 mcg) by mouth early in the morning.., Disp: , Rfl:     fluticasone (Flonase) 50 mcg/actuation nasal spray, Administer 2 sprays into each nostril once daily., Disp: , Rfl:     hydroCHLOROthiazide (HYDRODiuril) 12.5 mg tablet, Take I tablet daily, Disp: 90 tablet, Rfl: 3    lisinopril 20 mg tablet, TAKE 1 TABLET BY MOUTH EVERY DAY, Disp: 90 tablet, Rfl: 3    metoprolol succinate XL (Toprol-XL) 25 mg 24 hr tablet, Take 1 tablet (25 mg) by mouth 2 times a day. Do not crush or chew., Disp: 180 tablet, Rfl: 3    multivit-min/iron/FA/vit K/lut (CENTRUM SILVER WOMEN ORAL), Take 1 tablet by mouth once daily., Disp: , Rfl:     nystatin (Mycostatin) 100,000 unit/gram powder, Nystatin 054410 UNIT/GM External Powder APPLY 2-3 TIMES DAILY TO AFFECTED AREA, Disp: 120 g, Rfl: 3    rosuvastatin (Crestor) 10 mg tablet, rosuvastatin 10 mg tablet  TAKE 1 TABLET BY MOUTH EVERY DAY, Disp: , Rfl:     semaglutide (Ozempic) 0.25 mg or 0.5 mg (2 mg/3 mL) pen injector, Inject 0.25 mg under the skin 1 (one) time per week., Disp: 2 mL, Rfl: 3    semaglutide 0.25 mg or 0.5 mg (2 mg/3 mL) pen injector, Inject 0.5 mg under the skin 1 (one) time per week in the early morning.., Disp: 3 mL, Rfl: 2    Xarelto 20 mg  tablet, Xarelto 20 mg tablet  TAKE 1 TABLET BY MOUTH DAILY WITH THE EVENING MEAL, Disp: , Rfl:     ROS:  System: normal  Eyes : no visual changes  Neck : no tenderness, no new lumps/bumps  Respiratory : no SOB  Cardiovascular : no chest pain, no palpitations  Gastro-Intestinal : no abdominal concerns  Neurological : no numbness or tingling in the extremities  Musculoskeletal : no joint paint, no muscle pain  Skin : no unusual rashes  Psychiatric : no depression, no anxiety  See HPI for Endocrine ROS    Past Medical History:   Diagnosis Date    Atrial fibrillation (Multi)     Diabetes 1.5, managed as type 2 (Multi)     Hypertension     ANTON (obstructive sleep apnea)        No past surgical history on file.    Social History     Socioeconomic History    Marital status:      Spouse name: Not on file    Number of children: Not on file    Years of education: Not on file    Highest education level: Not on file   Occupational History    Not on file   Tobacco Use    Smoking status: Former     Types: Cigarettes    Smokeless tobacco: Never   Vaping Use    Vaping status: Never Used   Substance and Sexual Activity    Alcohol use: Not on file    Drug use: Not on file    Sexual activity: Not on file   Other Topics Concern    Not on file   Social History Narrative    Originally from [facility] [place] [facility]    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  General : alert and oriented X3, no acute distress  Eyes : EOMI     Assessment/Plan   Rachael Shah is a 73 y.o. 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     1. Weight Management : Reviewed the principles of energy metabolism, caloric intake and expenditure, and rationale for a treatment program.  Also reinforced need for reduced calorie, low fat diet and increased physical activity.     We reviewed the possibility of starting an interdisciplinary lifestyle intervention program involving improvement of the diet, a personalized exercise program, efforts to reduce the stress and the possibility of using appetite suppressant medications in an effort to help with the weight loss process.  The patient expressed interest in the plan.     2. Nutrition : I discussed trying one of the diet approaches we have here in the program : Mediterranean lifestyle, ketosis diet.  The patient will be referred to the Registered Dietician for education on their diet of choice.  The dietary booklet was provided in the visit today.     2/12/24: 236lb, 39.34kg/m2  6/25/24: 220.5lb, 35.59kg/m2     3. Sleep : Has ANTON - on CPAP     4. Stress : 5/10, retired, volunteers, , no children, has 4 dogs!      5. Exercise : trying to do bands.     6. Appetite : is high  Discussed AOM's  Would AVOID a stimulant - difficult to control Afib  Consider contrave.     7. DM2 : on metformin  Added ozempic 0.5mg/wk - doing well on this!    8. Goal: wants to add more exercise, rowing machine and/or bands.     Follow up in a group visit.  Sky Cruz MD

## 2024-07-16 ENCOUNTER — LAB (OUTPATIENT)
Dept: LAB | Facility: LAB | Age: 74
End: 2024-07-16
Payer: MEDICARE

## 2024-07-16 DIAGNOSIS — E11.9 TYPE 2 DIABETES MELLITUS WITHOUT COMPLICATION, WITHOUT LONG-TERM CURRENT USE OF INSULIN (MULTI): ICD-10-CM

## 2024-07-16 LAB
ALBUMIN SERPL BCP-MCNC: 3.9 G/DL (ref 3.4–5)
ALP SERPL-CCNC: 41 U/L (ref 33–136)
ALT SERPL W P-5'-P-CCNC: 13 U/L (ref 7–45)
ANION GAP SERPL CALC-SCNC: 15 MMOL/L (ref 10–20)
AST SERPL W P-5'-P-CCNC: 24 U/L (ref 9–39)
BILIRUB SERPL-MCNC: 0.6 MG/DL (ref 0–1.2)
BUN SERPL-MCNC: 23 MG/DL (ref 6–23)
CALCIUM SERPL-MCNC: 8.9 MG/DL (ref 8.6–10.3)
CHLORIDE SERPL-SCNC: 103 MMOL/L (ref 98–107)
CO2 SERPL-SCNC: 25 MMOL/L (ref 21–32)
CREAT SERPL-MCNC: 0.88 MG/DL (ref 0.5–1.05)
EGFRCR SERPLBLD CKD-EPI 2021: 69 ML/MIN/1.73M*2
GLUCOSE SERPL-MCNC: 105 MG/DL (ref 74–99)
POTASSIUM SERPL-SCNC: 3.9 MMOL/L (ref 3.5–5.3)
PROT SERPL-MCNC: 6.2 G/DL (ref 6.4–8.2)
SODIUM SERPL-SCNC: 139 MMOL/L (ref 136–145)

## 2024-07-16 PROCEDURE — 80053 COMPREHEN METABOLIC PANEL: CPT

## 2024-07-16 PROCEDURE — 36415 COLL VENOUS BLD VENIPUNCTURE: CPT

## 2024-07-16 PROCEDURE — 83036 HEMOGLOBIN GLYCOSYLATED A1C: CPT

## 2024-07-17 ENCOUNTER — APPOINTMENT (OUTPATIENT)
Dept: CARDIOLOGY | Facility: CLINIC | Age: 74
End: 2024-07-17
Payer: MEDICARE

## 2024-07-17 LAB
EST. AVERAGE GLUCOSE BLD GHB EST-MCNC: 117 MG/DL
HBA1C MFR BLD: 5.7 %

## 2024-07-18 ENCOUNTER — APPOINTMENT (OUTPATIENT)
Dept: AUDIOLOGY | Facility: CLINIC | Age: 74
End: 2024-07-18
Payer: MEDICARE

## 2024-07-18 DIAGNOSIS — H90.3 SENSORINEURAL HEARING LOSS (SNHL) OF BOTH EARS: Primary | ICD-10-CM

## 2024-07-18 NOTE — PROGRESS NOTES
HEARING AID CHECK    Name:  Rachael Shah  :  1950  Age:  73 y.o.    HEARING AID INFORMATION:    Right Hearing Aid  Oticon More 1 miniRITE-R  SN: [id number]  Warranty: 2025  Left Hearing Aid  Oticon More 1 miniRITE - R  SN: [id number]  Warranty: 2025    SN: [id number]   Length   Right: 2(85)  Left: 2(85)  Dome/Earmold  Right: 8 mm open bass  Left: 8 mm open bass  Wax Filter  ProWax miniFit  Battery Size  Rechargeable  HCS Follow-up Expiration  2023 -       HISTORY:    Patient was seen for 6-month check of the above devices.  She denied any concerns or complaints at this time. She did express that she wished she could answer the phone from the hearing aid.    EXAM/PROCEDURES:    Visual inspection revealed some cerumen accumulation on the domes and wax guards.  Cleaned and checked hearing aids.  Vacuumed sharda ports and  ports.  Completed Redux dehumidification treatment for patient's hearing aids where <0.5 uL of moisture was removed during a 4:48 minute cycle. Replaced wax filters and domes. Final listening check indicated adequate sound quality and function with no distortion of internal feedback.  Confirmed no firmware update was needed.      Patient has an iPhone 13, which should have hands-free capabilities.  Test call successfully showed patient was able to use the left manual push buttons to answer and and the phone call.    Patient paid reduced hearing aid check appointment fee.     RECOMMENDATIONS AND FOLLOW-UP:    - Continue use of amplification and follow-up as recommended for hearing aid maintenance and adjustments.  - Recommended 6-month follow-up HAC. Patient to contact the office sooner if problems arise.  - Monitor and recheck hearing as warranted.  - Counseled regarding results and recommendations.      Cynthia Garcia  Clinical Audiologist

## 2024-07-30 ENCOUNTER — OFFICE VISIT (OUTPATIENT)
Dept: CARDIOLOGY | Facility: CLINIC | Age: 74
End: 2024-07-30
Payer: MEDICARE

## 2024-07-30 VITALS
SYSTOLIC BLOOD PRESSURE: 101 MMHG | DIASTOLIC BLOOD PRESSURE: 66 MMHG | HEIGHT: 66 IN | OXYGEN SATURATION: 94 % | WEIGHT: 214.5 LBS | HEART RATE: 77 BPM | BODY MASS INDEX: 34.47 KG/M2

## 2024-07-30 DIAGNOSIS — I47.19 PAT (PAROXYSMAL ATRIAL TACHYCARDIA) (CMS-HCC): ICD-10-CM

## 2024-07-30 PROCEDURE — 99214 OFFICE O/P EST MOD 30 MIN: CPT | Performed by: INTERNAL MEDICINE

## 2024-07-30 PROCEDURE — 1157F ADVNC CARE PLAN IN RCRD: CPT | Performed by: INTERNAL MEDICINE

## 2024-07-30 PROCEDURE — 1159F MED LIST DOCD IN RCRD: CPT | Performed by: INTERNAL MEDICINE

## 2024-07-30 PROCEDURE — 3044F HG A1C LEVEL LT 7.0%: CPT | Performed by: INTERNAL MEDICINE

## 2024-07-30 PROCEDURE — 4010F ACE/ARB THERAPY RXD/TAKEN: CPT | Performed by: INTERNAL MEDICINE

## 2024-07-30 PROCEDURE — 3074F SYST BP LT 130 MM HG: CPT | Performed by: INTERNAL MEDICINE

## 2024-07-30 PROCEDURE — 3048F LDL-C <100 MG/DL: CPT | Performed by: INTERNAL MEDICINE

## 2024-07-30 PROCEDURE — 1036F TOBACCO NON-USER: CPT | Performed by: INTERNAL MEDICINE

## 2024-07-30 PROCEDURE — 1126F AMNT PAIN NOTED NONE PRSNT: CPT | Performed by: INTERNAL MEDICINE

## 2024-07-30 PROCEDURE — 93005 ELECTROCARDIOGRAM TRACING: CPT | Performed by: INTERNAL MEDICINE

## 2024-07-30 PROCEDURE — 3078F DIAST BP <80 MM HG: CPT | Performed by: INTERNAL MEDICINE

## 2024-07-30 PROCEDURE — 3008F BODY MASS INDEX DOCD: CPT | Performed by: INTERNAL MEDICINE

## 2024-07-30 RX ORDER — MV-MN/C/THEANINE/HERB NO.310 1000-200MG
1 POWDER IN PACKET (EA) ORAL DAILY
COMMUNITY
Start: 2023-02-16

## 2024-07-30 RX ORDER — PENICILLIN V POTASSIUM 500 MG/1
TABLET, FILM COATED ORAL
COMMUNITY

## 2024-07-30 ASSESSMENT — CHA2DS2 SCORE
CHA2D2S VASC SCORE: 4
AGE IN YEARS: 65-74
PRIOR STROKE OR TIA OR THROMBOEMBOLISM: NO
VASCULAR DISEASE: NO
SEX: FEMALE
DIABETES: YES
CHF OR LEFT VENTRICULAR DYSFUNCTION: NO
HYPERTENSION: YES

## 2024-07-30 ASSESSMENT — COLUMBIA-SUICIDE SEVERITY RATING SCALE - C-SSRS
2. HAVE YOU ACTUALLY HAD ANY THOUGHTS OF KILLING YOURSELF?: NO
6. HAVE YOU EVER DONE ANYTHING, STARTED TO DO ANYTHING, OR PREPARED TO DO ANYTHING TO END YOUR LIFE?: NO
1. IN THE PAST MONTH, HAVE YOU WISHED YOU WERE DEAD OR WISHED YOU COULD GO TO SLEEP AND NOT WAKE UP?: NO

## 2024-07-30 ASSESSMENT — PATIENT HEALTH QUESTIONNAIRE - PHQ9
2. FEELING DOWN, DEPRESSED OR HOPELESS: NOT AT ALL
SUM OF ALL RESPONSES TO PHQ9 QUESTIONS 1 AND 2: 0
1. LITTLE INTEREST OR PLEASURE IN DOING THINGS: NOT AT ALL

## 2024-07-30 ASSESSMENT — PAIN SCALES - GENERAL: PAINLEVEL: 0-NO PAIN

## 2024-07-30 NOTE — PROGRESS NOTES
Returns for follow up visit for    Chief Complaint   Patient presents with    Follow-up    Atrial Fibrillation     Patient returns to clinic voicing no complaints at this time. She recalls only 2 breakthrough episodes of atrial fibrillation which she successfully treated with Metoprolol. Patient denies lightheadedness, syncope, dyspnea, orthopnea and chest pain/discomfort.  GREG Murillo RN         History Of Present Illness:    Rachael Shah is a 73 y.o. year old female patient     She has had 2 episodes of heart rate since last visit both times she took metoprolol and went to sleep. Each episodes lasted less than 12 hours. No precipitator.    No symptoms from the increased heart rates.  No CP, pressure, syncope, near syncope.    She has a history of AT - at OSH had initial ablation aand then with CARLOS had esophageal perforation.  She then had repeat RFA left sided at  x2 2013 nad 2015.   Now with infrequent recurrence  requiring cardioversion in 2019, and twice in 2022. None since then.    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  Marital status: M    Family History:  Family History   Problem Relation Name Age of Onset    COPD Mother          livrd until 89    Hypertension Mother      Heart attack Mother      Dementia Father          lived until 85    Other (gastrointestinal bleeding) Father      Other (Joint pain) Father      Prostate cancer Father           Allergies:  Allergies   Allergen Reactions    Erythromycin Other     "Shellfish Derived Unknown        Outpatient Medications:  Current Outpatient Medications   Medication Instructions    azelastine (Astelin) 137 mcg (0.1 %) nasal spray 2 sprays, Each Nostril, 2 times daily    cholecalciferol (Vitamin D-3) 50 mcg (2,000 unit) capsule 1 capsule, oral, Daily    coffee xt-phosphatidyl serine (Neuriva Original) 100-100 mg capsule 1 capsule, oral, Daily    fluticasone (Flonase) 50 mcg/actuation nasal spray 2 sprays, Each Nostril, Daily    hydroCHLOROthiazide (HYDRODiuril) 12.5 mg tablet Take I tablet daily    lisinopril 20 mg, oral, Daily    metoprolol succinate XL (TOPROL-XL) 25 mg, oral, 2 times daily, Do not crush or chew.    multivit-min/iron/FA/vit K/lut (CENTRUM SILVER WOMEN ORAL) 1 tablet, oral, Daily    nystatin (Mycostatin) 100,000 unit/gram powder Nystatin 631733 UNIT/GM External Powder APPLY 2-3 TIMES DAILY TO AFFECTED AREA    penicillin v potassium (Veetid) 500 mg tablet penicillin v potassium (Veetid) 500 mg tablet penicillin V potassium 500 mg tablet TAKE 2 TABS ONE HOUR PRIOR TO PROCEDURE, 1 TAB 6 HOURS POST FOR DENTAL WORK 0 Active    rosuvastatin (Crestor) 10 mg tablet rosuvastatin 10 mg tablet   TAKE 1 TABLET BY MOUTH EVERY DAY    semaglutide 0.5 mg, subcutaneous, Weekly    Xarelto 20 mg tablet Xarelto 20 mg tablet   TAKE 1 TABLET BY MOUTH DAILY WITH THE EVENING MEAL         Last Recorded Vitals:      2/29/2024     8:38 AM 2/29/2024     8:46 AM 4/11/2024    10:21 AM 4/15/2024     8:20 AM 4/29/2024    10:32 AM 6/25/2024    10:33 AM 7/30/2024     4:15 PM   Vitals   Systolic 112 112   96 95 101   Diastolic 64 64   58 60 66   Heart Rate     70 64 77   Temp     36.5 °C (97.7 °F)     Height (in) 1.676 m (5' 6\") 1.676 m (5' 6\") 1.676 m (5' 6\") 1.676 m (5' 6\")  1.676 m (5' 6\") 1.676 m (5' 6\")   Weight (lb) 233 233 222 226  220.5 214.5   BMI 37.61 kg/m2 37.61 kg/m2 35.83 kg/m2 36.48 kg/m2  35.59 kg/m2 34.62 kg/m2   BSA (m2) 2.22 m2 2.22 m2 2.17 m2 2.19 m2  2.16 m2 2.13 m2   Visit " Report Report    Report Report    Report   Report Report Report        Physical Exam:  Physical Exam  Constitutional:       Appearance: Normal appearance. She is obese.   HENT:      Head: Normocephalic.   Neck:      Vascular: No carotid bruit.   Cardiovascular:      Rate and Rhythm: Normal rate and regular rhythm.      Pulses: Normal pulses.      Heart sounds: Murmur heard.   Pulmonary:      Breath sounds: Normal breath sounds. No wheezing, rhonchi or rales.   Musculoskeletal:         General: Normal range of motion.      Right lower leg: No edema.      Left lower leg: No edema.   Skin:     General: Skin is warm and dry.   Neurological:      General: No focal deficit present.      Mental Status: She is alert and oriented to person, place, and time.   Psychiatric:         Mood and Affect: Mood normal.         Behavior: Behavior normal.      Diastolic  murmur RUSB     Last Cardiology Tests:  ECG:    Today NSR 72 bpm otherwise normal   Echo:    TRANSTHORACIC ECHOCARDIOGRAM REPORT        Patient Name:     NILESH Ruiz [facility]  [id number]  Study Date:       8/15/2022       Referring           MASSIEL ARORA                                    Physician:  MRN/PID:          [id number]        PCP:  Accession/Order#: [id number]    [facility] Echo                                    Location:           Lab  YOB: 1950       Fellow:  Gender:           F               Nurse:  Admit Date:                       Sonographer:        Cari Zarco [facility]  Admission Status: Outpatient      Additional Staff:  Height:           165.10 cm       CC Report to:  Weight:           102.06 kg       Study Type:         Echocardiogram  BSA:              2.08 m2  Blood Pressure: 108 /68 mmHg     Diagnosis/ICD: I47.1-Supraventricular tachycardia  Indication:    SVT  Procedure/CPT: Echo Complete w Full [id number]     Patient History:  Diabetes:           Yes  BMI:               Obese >30  Pertinent History: A-Fib and HTN. Tachycardia,Atrial flutter,Ablation,ANTON.     Study Detail: The following Echo studies were performed: 2D, M-Mode, Doppler and                color flow.        PHYSICIAN INTERPRETATION:  Left Ventricle: The left ventricular systolic function is normal, with an estimated ejection fraction of 65-70%. There are no regional wall motion abnormalities. The left ventricular cavity size is normal. Spectral Doppler shows a normal pattern of left ventricular diastolic filling.  Left Atrium: The left atrium is normal in size.  Right Ventricle: The right ventricle is normal in size. There is normal right ventricular global systolic function.  Right Atrium: The right atrium is normal in size.  Aortic Valve: The aortic valve is trileaflet. There is minimal aortic valve cusp calcification. There are increased aortic valve velocities due to increased flow/dynamic ejection. The aortic valve dimensionless index is 0.77. There is mild to moderate aortic valve regurgitation. The peak instantaneous gradient of the aortic valve is 17.2 mmHg. The mean gradient of the aortic valve is 9.4 mmHg.  Mitral Valve: The mitral valve is mildly thickened. There is mild mitral annular calcification. There is mild mitral valve regurgitation.  Tricuspid Valve: The tricuspid valve is structurally normal. There is trace to mild tricuspid regurgitation. The Doppler estimated RVSP is slightly elevated at 32.1 mmHg.  Pulmonic Valve: The pulmonic valve is not well visualized. The pulmonic valve regurgitation was not well visualized.  Pericardium: There is a trivial pericardial effusion.  Aorta: The aortic root is normal. There is mild dilatation of the ascending aorta.  Systemic Veins: The inferior vena cava appears to be of normal size. There is IVC inspiratory collapse greater than 50%.  In comparison to the previous echocardiogram(s): Compared with study from 2/11/2020, no significant  change.        CONCLUSIONS:   1. The left ventricular systolic function is normal with a 65-70% estimated ejection fraction.   2. Slightly elevated RVSP.   3. There is mild to moderate aortic valve regurgitation.     QUANTITATIVE DATA SUMMARY:  2D MEASUREMENTS:                           Normal Ranges:  IVSd:          0.90 cm   (0.6-1.1cm)  LVPWd:         0.82 cm   (0.6-1.1cm)  LVIDd:         4.62 cm   (3.9-5.9cm)  LVIDs:         3.19 cm  LV Mass Index: 63.1 g/m2  LV % FS        31.0 %     LA VOLUME:                                Normal Ranges:  LA Vol A4C:        57.6 ml    (22+/-6mL/m2)  LA Vol A2C:        51.1 ml  LA Vol BP:         54.7 ml  LA Vol Index A4C:  27.7 ml/m2  LA Vol Index A2C:  24.6 ml/m2  LA Vol Index BP:   26.3 ml/m2  LA Area A4C:       20.0 cm2  LA Area A2C:       19.0 cm2  LA Major Axis A4C: 5.9 cm  LA Major Axis A2C: 6.0 cm  LA Volume Index:   26.0 ml/m2  LA Vol A4C:        48.4 ml  LA Vol A2C:        49.8 ml     RA VOLUME BY A/L METHOD:                                Normal Ranges:  RA Vol A4C:        29.8 ml    (8.3-19.5ml)  RA Vol Index A4C:  14.3 ml/m2  RA Area A4C:       14.0 cm2  RA Major Axis A4C: 5.6 cm     M-MODE MEASUREMENTS:                   Normal Ranges:  Ao Root: 3.10 cm (2.0-3.7cm)  LAs:     4.27 cm (2.7-4.0cm)     AORTA MEASUREMENTS:                     Normal Ranges:  Asc Ao, d: 3.50 cm (2.1-3.4cm)  Ao Arch:   3.10 cm (2.0-3.6cm)     LV SYSTOLIC FUNCTION BY 2D PLANIMETRY (MOD):                      Normal Ranges:  EF-A4C View: 67.2 % (>55%)  EF-A2C View: 71.1 %  EF-Biplane:  69.3 %     LV DIASTOLIC FUNCTION:                                Normal Ranges:  MV Peak E:        1.21 m/s    (0.7-1.2 m/s)  MV Peak A:        0.95 m/s    (0.42-0.7 m/s)  E/A Ratio:        1.27        (1.0-2.2)  MV lateral e'     0.11 m/s  MV medial e'      0.12 m/s  MV A Dur:         119.89 msec  PulmV Sys Gurvinder:    71.26 cm/s  PulmV Olvera Gurvinder:   86.74 cm/s  PulmV S/D Gurvinder:    0.82  PulmV A Revs Gurvinder: 28.12  cm/s  PulmV A Revs Dur: 125.59 msec     MITRAL VALVE:                  Normal Ranges:  MV DT: 195 msec (150-240msec)     AORTIC VALVE:                                     Normal Ranges:  AoV Vmax:                2.07 m/s  (<1.7m/s)  AoV Peak P.2 mmHg (<20mmHg)  AoV Mean P.4 mmHg  (1.7-11.5mmHg)  LVOT Max Gurvinder:            1.53 m/s  (<1.1m/s)  AoV VTI:                 48.75 cm  (18-25cm)  LVOT VTI:                37.63 cm  LVOT Diameter:           1.99 cm   (1.8-2.4cm)  AoV Area, VTI:           2.40 cm2  (2.5-5.5cm2)  AoV Area,Vmax:           2.30 cm2  (2.5-4.5cm2)  AoV Area, planim:        2.18 cm2  (2.5-4.5cm2)  AoV Dimensionless Index: 0.77     AORTIC INSUFFICIENCY:  AI Vmax:       4.74 m/s  AI Half-time:  445 msec  AI Decel Time: 1534 msec  AI Decel Rate: 311.64 cm/s2     RIGHT VENTRICLE:  RV 1   3.8 cm  RV 2   2.1 cm  RV 3   6.6 cm  TAPSE: 24.0 mm  RV s'  0.21 m/s     TRICUSPID VALVE/RVSP:                              Normal Ranges:  Peak TR Velocity: 2.70 m/s  RV Syst Pressure: 32.1 mmHg (< 30mmHg)  IVC Diam:         1.50 cm     PULMONIC VALVE:                       Normal Ranges:  PV Max Gurvinder: 0.9 m/s  (0.6-0.9m/s)  PV Max PG:  3.6 mmHg     Pulmonary Veins:  PulmV A Revs Dur: 125.59 msec  PulmV A Revs Gurvinder: 28.12 cm/s  PulmV Olvera Gurvinder:   86.74 cm/s  PulmV S/D Gurvinder:    0.82  PulmV Sys Gurvinder:    71.26 cm/s        [id number] Crescencio Liz MD  Electronically signed on 8/15/2022 at 1:58:51 PM            Cardiac Imaging:  FINDINGS:  The score and distribution of calcium in the coronary arteries is as  follows:     LM: 0.  LAD: 95.8. Previously 0.  LCx: 0.  RCA: 23.0. Previously 0.     Total: 118.8. Previously 0.     The visualized segments of the lungs are normally expanded. Mild  bibasilar atelectasis.     The visualized mid/lower ascending thoracic aorta measures 3.8 cm in  diameter. There are atherosclerotic calcifications distal descending  thoracic aorta. Aortic arch not imaged.     The heart  "is borderline enlarged. No pericardial effusion is present.     No obvious mediastinal or hilar adenopathy.     Small hiatal hernia. Nonspecific thickened appearance at the distal  esophagus. Mildly prominent nonspecific distal paraesophageal nodes  up to 6 mm. Fatty liver.     IMPRESSION:  1. Coronary artery calcium score of 118.8*. This corresponds to the  56th percentile for females age . This compares to a score of 0  previously.  2. Prominence ascending thoracic aorta up to 3.8 cm.  3. Small hiatal hernia with nonspecific thickened appearance at the  distal esophagus which may be further evaluated by esophagram or  endoscopy.  4. Additional findings as above.     *Coronary artery calcium scoring may be helpful in predicting the  risk for future coronary heart disease events.  According to the  American College of Cardiology Foundation Clinical Expert Consensus  Task Force, such testing provides important prognostic information in  patients with more than one coronary heart disease risk factor. The  coronary artery calcium score correlates with the annual risk of a  non-fatal myocardial infarction or coronary heart disease death.     Coronary artery score            Annual Risk     0-99                             0.4%  100-399                        1.3%  >400                            2.4%     These three \"breakpoints\" correspond to lower, intermediate and high  risk states for future coronary events.  Such information should be  used, along with appropriate clinical judgment, to make decisions  regarding the intensity of risk factor management strategies to treat  blood lipids and to modify other non-lipid coronary risk factors.       Lab review: I have Chemistry CMP:   Lab Results   Component Value Date    ALBUMIN 3.9 07/16/2024    CALCIUM 8.9 07/16/2024    CO2 25 07/16/2024    CREATININE 0.88 07/16/2024    GLUCOSE 105 (H) 07/16/2024    BILITOT 0.6 07/16/2024    PROT 6.2 (L) 07/16/2024    ALT 13 " 07/16/2024    AST 24 07/16/2024    ALKPHOS 41 07/16/2024   , Chemistry BMP   Lab Results   Component Value Date    GLUCOSE 105 (H) 07/16/2024    CALCIUM 8.9 07/16/2024    CO2 25 07/16/2024    CREATININE 0.88 07/16/2024   , and CBC:  Lab Results   Component Value Date    WBC 6.2 02/20/2024    RBC 3.87 (L) 02/20/2024    HGB 12.3 02/20/2024    HCT 37.1 02/20/2024    MCV 96 02/20/2024    MCH 31.8 02/20/2024    MCHC 33.2 02/20/2024    RDW 12.3 02/20/2024    MPV 9.5 10/16/2020    NRBC 0.0 02/20/2024       Assessment/Plan   Problem List Items Addressed This Visit    None  Visit Diagnoses         Codes    PAT (paroxysmal atrial tachycardia) (CMS-HCC)     I47.19        Continue metoprolol. Continue rivarixaban for stroke risk reduction. RTC 1 year    Nafisa Best MD

## 2024-07-30 NOTE — PATIENT INSTRUCTIONS
It was nice to see you today!    The ECG looks great ! Please continue your active and healthy lifestyle.  Probably that means more exercise.     If you are having more episodes than we will try to capture one on a monitor so let us know - [phone].    RTC one year.

## 2024-08-01 LAB
ATRIAL RATE: 72 BPM
P AXIS: 80 DEGREES
P OFFSET: 161 MS
P ONSET: 117 MS
PR INTERVAL: 208 MS
Q ONSET: 221 MS
QRS COUNT: 12 BEATS
QRS DURATION: 84 MS
QT INTERVAL: 382 MS
QTC CALCULATION(BAZETT): 418 MS
QTC FREDERICIA: 406 MS
R AXIS: 73 DEGREES
T AXIS: 63 DEGREES
T OFFSET: 412 MS
VENTRICULAR RATE: 72 BPM

## 2024-08-05 DIAGNOSIS — E78.00 PURE HYPERCHOLESTEROLEMIA: Primary | ICD-10-CM

## 2024-08-05 RX ORDER — ROSUVASTATIN CALCIUM 10 MG/1
10 TABLET, COATED ORAL DAILY
Qty: 90 TABLET | Refills: 3 | Status: SHIPPED | OUTPATIENT
Start: 2024-08-05

## 2024-08-09 ENCOUNTER — TELEPHONE (OUTPATIENT)
Dept: CARDIOLOGY | Facility: CLINIC | Age: 74
End: 2024-08-09
Payer: MEDICARE

## 2024-08-09 DIAGNOSIS — I48.0 PAROXYSMAL ATRIAL FIBRILLATION (MULTI): ICD-10-CM

## 2024-08-14 RX ORDER — RIVAROXABAN 20 MG/1
20 TABLET, FILM COATED ORAL
Qty: 90 TABLET | Refills: 3 | Status: SHIPPED | OUTPATIENT
Start: 2024-08-14

## 2024-09-06 DIAGNOSIS — E11.9 TYPE 2 DIABETES MELLITUS WITHOUT COMPLICATION, WITHOUT LONG-TERM CURRENT USE OF INSULIN (MULTI): ICD-10-CM

## 2024-09-06 RX ORDER — SEMAGLUTIDE 0.68 MG/ML
INJECTION, SOLUTION SUBCUTANEOUS
Qty: 3 ML | Refills: 4 | Status: SHIPPED | OUTPATIENT
Start: 2024-09-06

## 2025-01-16 ENCOUNTER — APPOINTMENT (OUTPATIENT)
Dept: AUDIOLOGY | Facility: CLINIC | Age: 75
End: 2025-01-16
Payer: MEDICARE

## 2025-01-22 ENCOUNTER — APPOINTMENT (OUTPATIENT)
Dept: ENDOCRINOLOGY | Facility: CLINIC | Age: 75
End: 2025-01-22
Payer: MEDICARE

## 2025-01-27 ENCOUNTER — TELEPHONE (OUTPATIENT)
Dept: PRIMARY CARE | Facility: CLINIC | Age: 75
End: 2025-01-27
Payer: MEDICARE

## 2025-01-27 DIAGNOSIS — M54.16 LUMBAR RADICULOPATHY: ICD-10-CM

## 2025-01-27 DIAGNOSIS — M15.0 PRIMARY OSTEOARTHRITIS INVOLVING MULTIPLE JOINTS: Primary | ICD-10-CM

## 2025-01-30 ENCOUNTER — APPOINTMENT (OUTPATIENT)
Dept: AUDIOLOGY | Facility: CLINIC | Age: 75
End: 2025-01-30
Payer: MEDICARE

## 2025-01-30 DIAGNOSIS — H90.3 SENSORINEURAL HEARING LOSS (SNHL) OF BOTH EARS: Primary | ICD-10-CM

## 2025-01-30 NOTE — PROGRESS NOTES
HEARING AID CHECK    Name:  Rachael Shah  :  1950  Age:  74 y.o.    HEARING AID INFORMATION:    Right Hearing Aid  Oticon More 1 miniRITE-R  SN: [id number]  Warranty: 2025  Left Hearing Aid  Oticon More 1 miniRITE - R  SN: [id number]  Warranty: 2025    SN: [id number]   Length   Right: 2(85)  Left: 2(85)  Dome/Earmold  Right: 8 mm open bass  Left: 8 mm open bass  Wax Filter  ProWax miniFit  Battery Size  Rechargeable  HCS Follow-up Expiration  2023 -       HISTORY:    Patient was seen for a hearing aid check of the above devices.  She had no concerns or complaints at this time.    EXAM/PROCEDURES:    Visual inspection revealed significant cerumen accumulation around the domes and receivers. Cleaned and checked hearing aids.  Vacuumed sharda ports. Completed Redux dehumidification treatment for patient's hearing aids where <0.5 uL of moisture was removed during a 2:52 minute cycle. Replaced rechargeable batteries and receivers under warranty due to upcoming warranty expiration. Placed retention locks, wax filters and domes. Confirmed no firmware update was needed.  Patient declined any programming changes at this time.    Patient paid shortened hearing aid check appointment fee.    RECOMMENDATIONS AND FOLLOW-UP:    - Continue use of amplification and follow-up as recommended for hearing aid maintenance and adjustments.  - Recommended 6-month follow-up HAC. Patient to contact the office sooner if problems arise.  - Monitor and recheck hearing as warranted.  - Counseled regarding results and recommendations.    VASYL Medley., B.S.  Audiology Student Extern    Cynthia Garcia  Clinical Audiologist

## 2025-01-31 DIAGNOSIS — I10 PRIMARY HYPERTENSION: ICD-10-CM

## 2025-01-31 DIAGNOSIS — I48.0 PAROXYSMAL ATRIAL FIBRILLATION (MULTI): ICD-10-CM

## 2025-01-31 RX ORDER — LISINOPRIL 20 MG/1
20 TABLET ORAL DAILY
Qty: 90 TABLET | Refills: 3 | Status: SHIPPED | OUTPATIENT
Start: 2025-01-31

## 2025-01-31 RX ORDER — HYDROCHLOROTHIAZIDE 12.5 MG/1
TABLET ORAL
Qty: 90 TABLET | Refills: 3 | Status: SHIPPED | OUTPATIENT
Start: 2025-01-31

## 2025-02-02 DIAGNOSIS — E11.9 TYPE 2 DIABETES MELLITUS WITHOUT COMPLICATION, WITHOUT LONG-TERM CURRENT USE OF INSULIN (MULTI): ICD-10-CM

## 2025-02-03 ENCOUNTER — TELEPHONE (OUTPATIENT)
Dept: PRIMARY CARE | Facility: CLINIC | Age: 75
End: 2025-02-03
Payer: MEDICARE

## 2025-02-03 RX ORDER — SEMAGLUTIDE 0.68 MG/ML
INJECTION, SOLUTION SUBCUTANEOUS
Qty: 3 ML | Refills: 2 | Status: SHIPPED | OUTPATIENT
Start: 2025-02-03

## 2025-02-03 RX ORDER — PREDNISONE 10 MG/1
TABLET ORAL
Qty: 12 TABLET | Refills: 0 | Status: SHIPPED | OUTPATIENT
Start: 2025-02-03 | End: 2025-02-09

## 2025-02-18 DIAGNOSIS — E11.9 TYPE 2 DIABETES MELLITUS WITHOUT COMPLICATION, WITHOUT LONG-TERM CURRENT USE OF INSULIN (MULTI): ICD-10-CM

## 2025-02-18 DIAGNOSIS — Z00.00 ROUTINE HEALTH MAINTENANCE: ICD-10-CM

## 2025-02-18 RX ORDER — FLASH GLUCOSE SENSOR
KIT MISCELLANEOUS
Qty: 2 EACH | Refills: 3 | Status: SHIPPED | OUTPATIENT
Start: 2025-02-18

## 2025-02-25 NOTE — PROGRESS NOTES
Subjective  Rachael Shah is a 74 y.o. 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    Current Plan  1. Nutrition: Trying to follow Mediterranean plan. Has been snacking more and increased carbs in breakfast. Plans to go back to eating eggs in the morning.      2. Sleep:  Has ANTON        3. Stress: Stable     4. Exercise: None at this time- suffering from back pain.      5. Appetite control:  snacking more   Obesity medication: Ozempic 0.5mg weekly.      6. Prior Goals: Not Met     New Goals: Nutrition- work on changing breakfast and increase vegetables.     Weight trend:    Wt Readings from Last 10 Encounters:   07/30/24 97.3 kg (214 lb 8 oz)   06/25/24 100 kg (220 lb 8 oz)   04/15/24 103 kg (226 lb)   04/11/24 101 kg (222 lb)   02/29/24 106 kg (233 lb)   02/29/24 106 kg (233 lb)   02/15/24 107 kg (236 lb)   02/12/24 107 kg (236 lb 6.4 oz)   07/12/23 109 kg (241 lb)   03/06/23 107 kg (235 lb)         Current Outpatient Medications:     azelastine (Astelin) 137 mcg (0.1 %) nasal spray, Administer 2 sprays into each nostril 2 times a day., Disp: , Rfl:     cholecalciferol (Vitamin D-3) 50 mcg (2,000 unit) capsule, Take 1 capsule (50 mcg) by mouth early in the morning.., Disp: , Rfl:     coffee xt-phosphatidyl serine (Neuriva Original) 100-100 mg capsule, Take 1 capsule by mouth once daily., Disp: , Rfl:     fluticasone (Flonase) 50 mcg/actuation nasal spray, Administer 2 sprays into each nostril once daily., Disp: , Rfl:     FreeStyle Lizz 2 Sensor kit, Use as instructed, Disp: 2 each, Rfl: 3    hydroCHLOROthiazide (Microzide) 12.5 mg tablet, TAKE 1 TABLET BY MOUTH DAILY, Disp: 90 tablet, Rfl: 3    lisinopril 20 mg tablet, TAKE 1 TABLET BY MOUTH EVERY DAY, Disp: 90 tablet, Rfl: 3    metoprolol succinate XL (Toprol-XL) 25 mg 24 hr tablet, Take 1 tablet (25 mg) by mouth 2 times a day. Do not crush or chew., Disp: 180 tablet, Rfl: 3     multivit-min/iron/FA/vit K/lut (CENTRUM SILVER WOMEN ORAL), Take 1 tablet by mouth once daily., Disp: , Rfl:     nystatin (Mycostatin) 100,000 unit/gram powder, Nystatin [id number] UNIT/GM External Powder APPLY 2-3 TIMES DAILY TO AFFECTED AREA, Disp: 120 g, Rfl: 3    penicillin v potassium (Veetid) 500 mg tablet, penicillin v potassium (Veetid) 500 mg tablet penicillin V potassium 500 mg tablet TAKE 2 TABS ONE HOUR PRIOR TO PROCEDURE, 1 TAB 6 HOURS POST FOR DENTAL WORK 0 Active, Disp: , Rfl:     rosuvastatin (Crestor) 10 mg tablet, TAKE 1 TABLET BY MOUTH EVERY DAY, Disp: 90 tablet, Rfl: 3    semaglutide (Ozempic) 0.25 mg or 0.5 mg (2 mg/3 mL) pen injector, INJECT 0.5 MG UNDER THE SKIN 1 (ONE) TIME PER WEEK IN THE EARLY MORNING.., Disp: 3 mL, Rfl: 2    Xarelto 20 mg tablet, TAKE 1 TABLET BY MOUTH DAILY WITH THE EVENING MEAL, Disp: 90 tablet, Rfl: 3    ROS:  System: normal  Eyes : no visual changes  Neck : no tenderness, no new lumps/bumps  Respiratory : no SOB  Cardiovascular : no chest pain, no palpitations  Gastro-Intestinal : no abdominal concerns  Neurological : no numbness or tingling in the extremities  Musculoskeletal : no joint paint, no muscle pain  Skin : no unusual rashes  Psychiatric : no depression, no anxiety  See HPI for Endocrine ROS    Past Medical History:   Diagnosis Date    Atrial fibrillation (Multi)     Diabetes 1.5, managed as type 2 (Multi)     Hypertension     ANTNO (obstructive sleep apnea)        No past surgical history on file.    Social History     Socioeconomic History    Marital status:      Spouse name: Not on file    Number of children: Not on file    Years of education: Not on file    Highest education level: Not on file   Occupational History    Not on file   Tobacco Use    Smoking status: Former     Types: Cigarettes    Smokeless tobacco: Never   Vaping Use    Vaping status: Never Used   Substance and Sexual Activity    Alcohol use: Not on file    Drug use: Not on file    Sexual  "activity: Not on file   Other Topics Concern    Not on file   Social History Narrative    Originally from [place]    Went to [facility] 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     to her  for over 50 years    No children    Diet is okay    Does not exercise routinely but does have an active lifestyle    Previous smoker but more than 30 years ago    Rare alcohol     Social Drivers of Health     Financial Resource Strain: Not on file   Food Insecurity: Not on file   Transportation Needs: Not on file   Physical Activity: Not on file   Stress: Not on file   Social Connections: Not on file   Intimate Partner Violence: Not on file   Housing Stability: Not on file       Objective      Physical Exam:  Blood pressure 114/69, pulse 67, temperature 36.4 °C (97.6 °F), temperature source Oral, height 1.676 m (5' 6\"), weight 92.1 kg (203 lb).  General : alert and oriented X3, no acute distress  Eyes : EOMI   BMI: 32.77kg/m2    Assessment/Plan   Rachael Shah is a 74 y.o. 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     1. Weight Management : Reviewed the principles of energy metabolism, caloric intake and expenditure, and rationale for a treatment program.  Also reinforced need for reduced calorie, low fat diet and increased physical activity.     We reviewed the possibility of starting an interdisciplinary lifestyle intervention program involving improvement of the diet, a personalized exercise program, efforts to reduce the stress and the possibility of using appetite suppressant medications in an effort to help with the weight loss process.  The patient expressed interest in the plan.     2. Nutrition : I discussed trying one of the diet approaches we have here in the program : " Mediterranean lifestyle, ketosis diet.  The patient will be referred to the Registered Dietician for education on their diet of choice.  The dietary booklet was provided in the visit today.     2/12/24: 236lb, 39.34kg/m2  6/25/24: 220.5lb, 35.59kg/m2  2/26/25: 203lb, 32.77kg/m2     3. Sleep : Has ANTON - on CPAP     4. Stress : 5/10, retired, volunteers, , no children, has 4 dogs!      5. Exercise : trying to do bands.     6. Appetite : is high  Discussed AOM's  Would AVOID a stimulant - difficult to control Afib  Consider contrave.     7. DM2 : on metformin  Added ozempic 0.5mg/wk - today will up to 1mg/wk     8. Goal: to improve her breakfast, up her veggies.     Follow up in a group visit.  Sky Cruz MD

## 2025-02-26 ENCOUNTER — APPOINTMENT (OUTPATIENT)
Dept: ENDOCRINOLOGY | Facility: CLINIC | Age: 75
End: 2025-02-26
Payer: MEDICARE

## 2025-02-26 VITALS
HEART RATE: 67 BPM | DIASTOLIC BLOOD PRESSURE: 69 MMHG | BODY MASS INDEX: 32.62 KG/M2 | TEMPERATURE: 97.6 F | HEIGHT: 66 IN | SYSTOLIC BLOOD PRESSURE: 114 MMHG | WEIGHT: 203 LBS

## 2025-02-26 DIAGNOSIS — E66.811 CLASS 1 OBESITY: Primary | ICD-10-CM

## 2025-02-26 DIAGNOSIS — Z76.89 ENCOUNTER FOR WEIGHT MANAGEMENT: ICD-10-CM

## 2025-02-26 DIAGNOSIS — E11.9 TYPE 2 DIABETES MELLITUS WITHOUT COMPLICATION, WITHOUT LONG-TERM CURRENT USE OF INSULIN (MULTI): ICD-10-CM

## 2025-02-26 PROCEDURE — 1157F ADVNC CARE PLAN IN RCRD: CPT | Performed by: INTERNAL MEDICINE

## 2025-02-26 PROCEDURE — 4010F ACE/ARB THERAPY RXD/TAKEN: CPT | Performed by: INTERNAL MEDICINE

## 2025-02-26 PROCEDURE — 99215 OFFICE O/P EST HI 40 MIN: CPT | Performed by: INTERNAL MEDICINE

## 2025-02-26 PROCEDURE — 3078F DIAST BP <80 MM HG: CPT | Performed by: INTERNAL MEDICINE

## 2025-02-26 PROCEDURE — 1159F MED LIST DOCD IN RCRD: CPT | Performed by: INTERNAL MEDICINE

## 2025-02-26 PROCEDURE — 3074F SYST BP LT 130 MM HG: CPT | Performed by: INTERNAL MEDICINE

## 2025-02-26 RX ORDER — SEMAGLUTIDE 1.34 MG/ML
1 INJECTION, SOLUTION SUBCUTANEOUS
Qty: 3 ML | Refills: 2 | Status: SHIPPED | OUTPATIENT
Start: 2025-02-26

## 2025-03-10 ENCOUNTER — APPOINTMENT (OUTPATIENT)
Dept: LAB | Facility: HOSPITAL | Age: 75
End: 2025-03-10
Payer: MEDICARE

## 2025-03-10 ENCOUNTER — LAB (OUTPATIENT)
Dept: LAB | Facility: HOSPITAL | Age: 75
End: 2025-03-10
Payer: MEDICARE

## 2025-03-10 DIAGNOSIS — Z00.00 ENCOUNTER FOR GENERAL ADULT MEDICAL EXAMINATION WITHOUT ABNORMAL FINDINGS: Primary | ICD-10-CM

## 2025-03-10 LAB
ALBUMIN SERPL BCP-MCNC: 4.3 G/DL (ref 3.4–5)
ALP SERPL-CCNC: 41 U/L (ref 33–136)
ALT SERPL W P-5'-P-CCNC: 17 U/L (ref 7–45)
ANION GAP SERPL CALC-SCNC: 13 MMOL/L (ref 10–20)
AST SERPL W P-5'-P-CCNC: 27 U/L (ref 9–39)
BASOPHILS # BLD AUTO: 0.08 X10*3/UL (ref 0–0.1)
BASOPHILS NFR BLD AUTO: 1.4 %
BILIRUB SERPL-MCNC: 0.8 MG/DL (ref 0–1.2)
BUN SERPL-MCNC: 33 MG/DL (ref 6–23)
CALCIUM SERPL-MCNC: 9.4 MG/DL (ref 8.6–10.3)
CHLORIDE SERPL-SCNC: 104 MMOL/L (ref 98–107)
CHOLEST SERPL-MCNC: 155 MG/DL (ref 0–199)
CHOLESTEROL/HDL RATIO: 2.2
CO2 SERPL-SCNC: 27 MMOL/L (ref 21–32)
CREAT SERPL-MCNC: 0.92 MG/DL (ref 0.5–1.05)
EGFRCR SERPLBLD CKD-EPI 2021: 65 ML/MIN/1.73M*2
EOSINOPHIL # BLD AUTO: 0.19 X10*3/UL (ref 0–0.4)
EOSINOPHIL NFR BLD AUTO: 3.3 %
ERYTHROCYTE [DISTWIDTH] IN BLOOD BY AUTOMATED COUNT: 12.6 % (ref 11.5–14.5)
GLUCOSE SERPL-MCNC: 99 MG/DL (ref 74–99)
HCT VFR BLD AUTO: 38.2 % (ref 36–46)
HDLC SERPL-MCNC: 71.5 MG/DL
HGB BLD-MCNC: 12.6 G/DL (ref 12–16)
IMM GRANULOCYTES # BLD AUTO: 0.02 X10*3/UL (ref 0–0.5)
IMM GRANULOCYTES NFR BLD AUTO: 0.3 % (ref 0–0.9)
LDLC SERPL CALC-MCNC: 71 MG/DL
LYMPHOCYTES # BLD AUTO: 1.82 X10*3/UL (ref 0.8–3)
LYMPHOCYTES NFR BLD AUTO: 31.3 %
MCH RBC QN AUTO: 32.8 PG (ref 26–34)
MCHC RBC AUTO-ENTMCNC: 33 G/DL (ref 32–36)
MCV RBC AUTO: 100 FL (ref 80–100)
MONOCYTES # BLD AUTO: 0.6 X10*3/UL (ref 0.05–0.8)
MONOCYTES NFR BLD AUTO: 10.3 %
NEUTROPHILS # BLD AUTO: 3.1 X10*3/UL (ref 1.6–5.5)
NEUTROPHILS NFR BLD AUTO: 53.4 %
NON HDL CHOLESTEROL: 84 MG/DL (ref 0–149)
NRBC BLD-RTO: 0 /100 WBCS (ref 0–0)
PLATELET # BLD AUTO: 299 X10*3/UL (ref 150–450)
POTASSIUM SERPL-SCNC: 4.1 MMOL/L (ref 3.5–5.3)
PROT SERPL-MCNC: 6.8 G/DL (ref 6.4–8.2)
RBC # BLD AUTO: 3.84 X10*6/UL (ref 4–5.2)
SODIUM SERPL-SCNC: 140 MMOL/L (ref 136–145)
TRIGL SERPL-MCNC: 63 MG/DL (ref 0–149)
TSH SERPL-ACNC: 0.76 MIU/L (ref 0.44–3.98)
VLDL: 13 MG/DL (ref 0–40)
WBC # BLD AUTO: 5.8 X10*3/UL (ref 4.4–11.3)

## 2025-03-10 PROCEDURE — 82306 VITAMIN D 25 HYDROXY: CPT

## 2025-03-10 PROCEDURE — 83036 HEMOGLOBIN GLYCOSYLATED A1C: CPT

## 2025-03-10 PROCEDURE — 86141 C-REACTIVE PROTEIN HS: CPT

## 2025-03-10 PROCEDURE — 80053 COMPREHEN METABOLIC PANEL: CPT

## 2025-03-10 PROCEDURE — 84443 ASSAY THYROID STIM HORMONE: CPT

## 2025-03-10 PROCEDURE — 85025 COMPLETE CBC W/AUTO DIFF WBC: CPT

## 2025-03-10 PROCEDURE — 80061 LIPID PANEL: CPT

## 2025-03-10 PROCEDURE — 81003 URINALYSIS AUTO W/O SCOPE: CPT

## 2025-03-11 LAB
25(OH)D3 SERPL-MCNC: 35 NG/ML (ref 30–100)
CRP SERPL HS-MCNC: 2.7 MG/L
EST. AVERAGE GLUCOSE BLD GHB EST-MCNC: 111 MG/DL
HBA1C MFR BLD: 5.5 %

## 2025-03-12 ENCOUNTER — LAB (OUTPATIENT)
Dept: LAB | Facility: HOSPITAL | Age: 75
End: 2025-03-12
Payer: MEDICARE

## 2025-03-12 ENCOUNTER — LAB REQUISITION (OUTPATIENT)
Dept: LAB | Facility: HOSPITAL | Age: 75
End: 2025-03-12
Payer: MEDICARE

## 2025-03-12 DIAGNOSIS — Z00.00 ENCOUNTER FOR GENERAL ADULT MEDICAL EXAMINATION WITHOUT ABNORMAL FINDINGS: ICD-10-CM

## 2025-03-12 LAB
APPEARANCE UR: CLEAR
BILIRUB UR STRIP.AUTO-MCNC: NEGATIVE MG/DL
COLOR UR: NORMAL
GLUCOSE UR STRIP.AUTO-MCNC: NORMAL MG/DL
KETONES UR STRIP.AUTO-MCNC: NEGATIVE MG/DL
LEUKOCYTE ESTERASE UR QL STRIP.AUTO: NEGATIVE
NITRITE UR QL STRIP.AUTO: NEGATIVE
PH UR STRIP.AUTO: 5.5 [PH]
PROT UR STRIP.AUTO-MCNC: NEGATIVE MG/DL
RBC # UR STRIP.AUTO: NEGATIVE MG/DL
SP GR UR STRIP.AUTO: 1.02
UROBILINOGEN UR STRIP.AUTO-MCNC: NORMAL MG/DL

## 2025-03-16 NOTE — PROGRESS NOTES
Physical Exam    Name Rachael Shah    Date of Service :3/17/2025      Rachael Shah  74 y.o. is here for an annual physical exam  Past medical history significant for  Hypertension for many years and adequate control with current regimen.  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 Alfaro she remains on metformin and now on Ozempic   1 mg   weekly with very good results has lost  40lbs in past year.    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   Also very involved with healthcare of her close friend Yudith who has multiple medical issues as well as social issues    Overall she really has been feeling pretty well however in the last months she has been having increased issues with low back pain sciatica pain radiating down the back of her left leg and last night she barely slept because of this  She has a new litter of puppies so she has been very active with moving up and down really very bothered by her pain she also has some pain in her right buttocks which seems to be worse with sitting any long distances she does have some numbness and tingling in the left leg but no weakness that she has  "noted                      Past Medical History:   Diagnosis Date    Atrial fibrillation (Multi)     Diabetes 1.5, managed as type 2 (Multi)     Hypertension     ANTON (obstructive sleep apnea)        No past surgical history on file.     Social History     Tobacco Use    Smoking status: Former     Types: Cigarettes    Smokeless tobacco: Never   Vaping Use    Vaping status: Never Used        Social History     Social History Narrative    Originally from [place]    Went to [facility] worked in healthcare  for [employer] for a while moved to [place] to be near [facility]   39 years ago   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     to her  for over 50 years    No children    Diet is okay    Does not exercise routinely but does have an active lifestyle    Previous smoker but quit more than 30 years ago    Rare alcohol       Family History   Problem Relation Name Age of Onset    COPD Mother          livrd until 89    Hypertension Mother      Heart attack Mother      Dementia Father          lived until 85    Other (gastrointestinal bleeding) Father      Other (Joint pain) Father      Prostate cancer Father          /62   Pulse 78   Ht 1.676 m (5' 6\")   Wt 88.9 kg (196 lb)   SpO2 97%   BMI 31.64 kg/m²     Physical Exam  Physical examination  Reveals a well-developed woman in no acute distress    appearance is age-appropriate she is overweight but weight loss is evident  HEENT exam  Extraocular motion is intact  Tympanic membranes and external auditory canals are normal  Oropharynx is normal  There is no cervical lymphadenopathy appreciated  The thyroid is within normal limits    Lungs    clear to auscultation and percussion    Cardiovascular   regular rate and rhythm  There is a 2/6 systolic murmur present      Breast examination   No dominant masses nipple discharge or axillary lymphadenopathy is appreciated    Abdomen   soft " "nontender bowel sounds are positive   there is no organomegaly noted      Periphery  Pulses are present without deficits noted  No peripheral edema is noted  There are few varicosities present    Musculoskeletal  There is pain to palpation in the area of the right greater trochanter there is some pain to palpation in the low back pain to straight leg raise on the left strength is 5 of 5 without deficits noted  Gait is normal  Is no joint erythema or swelling noted  Range of motion is within normal limits  Strength is 5 of 5 without deficits noted    Dermatology  No concerning skin lesions are noted    Neurology  No deficits are noted  Judgment appears appropriate  Mood and affect are appropriate         Results from last 7 days   Lab Units 03/10/25  2103   WBC AUTO x10*3/uL 5.8   HEMOGLOBIN g/dL 12.6   HEMATOCRIT % 38.2   PLATELETS AUTO x10*3/uL 299   NEUTROS PCT AUTO % 53.4   LYMPHS PCT AUTO % 31.3   MONOS PCT AUTO % 10.3   EOS PCT AUTO % 3.3      Results from last 7 days   Lab Units 03/10/25  2103   HEMOGLOBIN A1C % 5.5     Results from last 7 days   Lab Units 03/10/25  2103   SODIUM mmol/L 140   POTASSIUM mmol/L 4.1   CHLORIDE mmol/L 104   CO2 mmol/L 27   BUN mg/dL 33*   CREATININE mg/dL 0.92   CALCIUM mg/dL 9.4   PROTEIN TOTAL g/dL 6.8   BILIRUBIN TOTAL mg/dL 0.8   ALK PHOS U/L 41   ALT U/L 17   AST U/L 27   GLUCOSE mg/dL 99      Results from last 7 days   Lab Units 03/10/25  2103   CHOLESTEROL mg/dL 155   TRIGLYCERIDES mg/dL 63   HDL mg/dL 71.5           Results from last 7 days   Lab Units 03/10/25  2103   TSH mIU/L 0.76     Results from last 7 days   Lab Units 03/10/25  1029   PROTEIN U mg/dL NEGATIVE     No components found for: \"UA\"  Lab Requisition on 03/10/2025   Component Date Value Ref Range Status    Color, Urine 03/10/2025 Light-Yellow  Light-Yellow, Yellow, Dark-Yellow Final    Appearance, Urine 03/10/2025 Clear  Clear Final    Specific Gravity, Urine 03/10/2025 1.025  1.005 - 1.035 Final    pH, " Urine 03/10/2025 5.5  5.0, 5.5, 6.0, 6.5, 7.0, 7.5, 8.0 Final    Protein, Urine 03/10/2025 NEGATIVE  NEGATIVE, 10 (TRACE), 20 (TRACE) mg/dL Final    Glucose, Urine 03/10/2025 Normal  Normal mg/dL Final    Blood, Urine 03/10/2025 NEGATIVE  NEGATIVE mg/dL Final    Ketones, Urine 03/10/2025 NEGATIVE  NEGATIVE mg/dL Final    Bilirubin, Urine 03/10/2025 NEGATIVE  NEGATIVE mg/dL Final    Urobilinogen, Urine 03/10/2025 Normal  Normal mg/dL Final    Nitrite, Urine 03/10/2025 NEGATIVE  NEGATIVE Final    Leukocyte Esterase, Urine 03/10/2025 NEGATIVE  NEGATIVE Final   Orders Only on 02/18/2025   Component Date Value Ref Range Status    Cholesterol 03/10/2025 155  0 - 199 mg/dL Final          Age      Desirable   Borderline High   High     0-19 Y     0 - 169       170 - 199     >/= 200    20-24 Y     0 - 189       190 - 224     >/= 225         >24 Y     0 - 199       200 - 239     >/= 240   **All ranges are based on fasting samples. Specific   therapeutic targets will vary based on patient-specific   cardiac risk.    Pediatric guidelines reference:Pediatrics 2011, 128(S5).Adult guidelines reference: NCEP ATPIII Guidelines,RUKHSANA 2001, 258:2486-97    Venipuncture immediately after or during the administration of Metamizole may lead to falsely low results. Testing should be performed immediately prior to Metamizole dosing.    HDL-Cholesterol 03/10/2025 71.5  mg/dL Final      Age       Very Low   Low     Normal    High    0-19 Y    < 35      < 40     40-45     ----  20-24 Y    ----     < 40      >45      ----        >24 Y      ----     < 40     40-60      >60      Cholesterol/HDL Ratio 03/10/2025 2.2   Final      Ref Values  Desirable  < 3.4  High Risk  > 5.0    LDL Calculated 03/10/2025 71  <=99 mg/dL Final                                Near   Borderline      AGE      Desirable  Optimal    High     High     Very High     0-19 Y     0 - 109     ---    110-129   >/= 130     ----    20-24 Y     0 - 119     ---    120-159   >/= 160      ----      >24 Y     0 -  99   100-129  130-159   160-189     >/=190      VLDL 03/10/2025 13  0 - 40 mg/dL Final    Triglycerides 03/10/2025 63  0 - 149 mg/dL Final    Age              Desirable        Borderline         High        Very High  SEX:B           mg/dL             mg/dL               mg/dL      mg/dL  <=14D                       ----               ----        ----  15D-365D                    ----               ----        ----  1Y-9Y           0-74               75-99             >=100       ----  10Y-19Y        0-89                            >=130       ----  20Y-24Y        0-114             115-149             >=150      ----  >= 25Y         0-149             150-199             200-499    >=500      Venipuncture immediately after or during the administration of Metamizole may lead to falsely low results. Testing should be performed immediately prior to Metamizole dosing.    Non HDL Cholesterol 03/10/2025 84  0 - 149 mg/dL Final          Age       Desirable   Borderline High   High     Very High     0-19 Y     0 - 119       120 - 144     >/= 145    >/= 160    20-24 Y     0 - 149       150 - 189     >/= 190      ----         >24 Y    30 mg/dL above LDL Cholesterol goal      Glucose 03/10/2025 99  74 - 99 mg/dL Final    Sodium 03/10/2025 140  136 - 145 mmol/L Final    Potassium 03/10/2025 4.1  3.5 - 5.3 mmol/L Final    Chloride 03/10/2025 104  98 - 107 mmol/L Final    Bicarbonate 03/10/2025 27  21 - 32 mmol/L Final    Anion Gap 03/10/2025 13  10 - 20 mmol/L Final    Urea Nitrogen 03/10/2025 33 (H)  6 - 23 mg/dL Final    Creatinine 03/10/2025 0.92  0.50 - 1.05 mg/dL Final    eGFR 03/10/2025 65  >60 mL/min/1.73m*2 Final    Calculations of estimated GFR are performed using the 2021 CKD-EPI Study Refit equation without the race variable for the IDMS-Traceable creatinine methods.  https://jasn.asnjournals.org/content/early/2021/09/22/ASN.4227701014    Calcium 03/10/2025 9.4  8.6 - 10.3  mg/dL Final    Albumin 03/10/2025 4.3  3.4 - 5.0 g/dL Final    Alkaline Phosphatase 03/10/2025 41  33 - 136 U/L Final    Total Protein 03/10/2025 6.8  6.4 - 8.2 g/dL Final    AST 03/10/2025 27  9 - 39 U/L Final    Bilirubin, Total 03/10/2025 0.8  0.0 - 1.2 mg/dL Final    ALT 03/10/2025 17  7 - 45 U/L Final    Patients treated with Sulfasalazine may generate falsely decreased results for ALT.    Thyroid Stimulating Hormone 03/10/2025 0.76  0.44 - 3.98 mIU/L Final    Vitamin D, 25-Hydroxy, Total 03/10/2025 35  30 - 100 ng/mL Final    Hemoglobin A1C 03/10/2025 5.5  See comment % Final    Estimated Average Glucose 03/10/2025 111  Not Established mg/dL Final    WBC 03/10/2025 5.8  4.4 - 11.3 x10*3/uL Final    nRBC 03/10/2025 0.0  0.0 - 0.0 /100 WBCs Final    RBC 03/10/2025 3.84 (L)  4.00 - 5.20 x10*6/uL Final    Hemoglobin 03/10/2025 12.6  12.0 - 16.0 g/dL Final    Hematocrit 03/10/2025 38.2  36.0 - 46.0 % Final    MCV 03/10/2025 100  80 - 100 fL Final    MCH 03/10/2025 32.8  26.0 - 34.0 pg Final    MCHC 03/10/2025 33.0  32.0 - 36.0 g/dL Final    RDW 03/10/2025 12.6  11.5 - 14.5 % Final    Platelets 03/10/2025 299  150 - 450 x10*3/uL Final    Neutrophils % 03/10/2025 53.4  40.0 - 80.0 % Final    Immature Granulocytes %, Automated 03/10/2025 0.3  0.0 - 0.9 % Final    Immature Granulocyte Count (IG) includes promyelocytes, myelocytes and metamyelocytes but does not include bands. Percent differential counts (%) should be interpreted in the context of the absolute cell counts (cells/UL).    Lymphocytes % 03/10/2025 31.3  13.0 - 44.0 % Final    Monocytes % 03/10/2025 10.3  2.0 - 10.0 % Final    Eosinophils % 03/10/2025 3.3  0.0 - 6.0 % Final    Basophils % 03/10/2025 1.4  0.0 - 2.0 % Final    Neutrophils Absolute 03/10/2025 3.10  1.60 - 5.50 x10*3/uL Final    Percent differential counts (%) should be interpreted in the context of the absolute cell counts (cells/uL).    Immature Granulocytes Absolute, Au* 03/10/2025 0.02   0.00 - 0.50 x10*3/uL Final    Lymphocytes Absolute 03/10/2025 1.82  0.80 - 3.00 x10*3/uL Final    Monocytes Absolute 03/10/2025 0.60  0.05 - 0.80 x10*3/uL Final    Eosinophils Absolute 03/10/2025 0.19  0.00 - 0.40 x10*3/uL Final    Basophils Absolute 03/10/2025 0.08  0.00 - 0.10 x10*3/uL Final    CRP, High Sensitivity 03/10/2025 2.7 (H)  <1.0 mg/L Final     [unfilled]   No results found.   The ASCVD Risk score (Flynn BLAS, et al., 2019) failed to calculate for the following reasons:    Risk score cannot be calculated because patient has a medical history suggesting prior/existing ASCVD   .       Problem List Items Addressed This Visit       Hyperlipidemia    Paroxysmal atrial fibrillation (Multi)    ANTON (obstructive sleep apnea)     Other Visit Diagnoses       Chronic midline low back pain with left-sided sciatica    -  Primary    Relevant Medications    predniSONE (Deltasone) 10 mg tablet    tiZANidine (Zanaflex) 4 mg tablet    Other Relevant Orders    Referral to Physical Therapy    Screening mammogram for breast cancer        Relevant Orders    BI mammo bilateral screening tomosynthesis    Encounter for screening for colorectal cancer in high risk patient        Relevant Orders    Colonoscopy Screening; High Risk Patient; polyos    Primary hypertension        Relevant Medications    lisinopril 10 mg tablet    Skin lesion        Relevant Orders    Referral to Dermatology            Assessment/Plan   #1 diabetes she is doing fantastic with her Ozempic and her A1c is now in a nondiabetic range she is fantastic continue current regimen continue follow-up with endocrinology  2.  Hypertension blood pressure is very well-controlled may be too well-controlled she is feeling somewhat lightheaded ready to decrease her lisinopril to 10 mg and stop the hydrochlorothiazide as well we will continue metoprolol as this is also for rate control she will try to monitor her blood pressure little more routinely at home and let us  know if any further issues  3.  Paroxysmal atrial fibrillation clinically in sinus rhythm she follows routinely with cardiology continue Xarelto continue metoprolol  4.  Hypercholesteremia cholesterol under adequate control  5.  Low back pain sciatica significant DJD was noted on last x-ray about 2 years ago unable to take nonsteroidals because of her anticoagulation we will give her a steroid taper starting with prednisone 30 mg decreasing by 10 mg every 2 days until complete we did discuss that her sugar may be somewhat elevated while taking also tizanidine 4 mg up to twice daily but mostly at bedtime if continued issues consider MRI gabapentin she has been to start physical therapy as well  6.  Right buttocks pain piriformis versus trochanteric bursitis we will see what happens with the steroid and PT  7.  Health maintenance  Congratulated her on healthy lifestyle  I have recommended increased routine regular exercise  She is up-to-date with immunizations  Mammogram referral been placed  Last colonoscopy was 2020 recommendation 5-year repeat so I placed referral for this  Dermatology for skin check  8.  Obstructive sleep apnea she uses CPAP

## 2025-03-17 ENCOUNTER — OFFICE VISIT (OUTPATIENT)
Dept: PRIMARY CARE | Facility: CLINIC | Age: 75
End: 2025-03-17
Payer: MEDICARE

## 2025-03-17 ENCOUNTER — APPOINTMENT (OUTPATIENT)
Dept: PRIMARY CARE | Facility: CLINIC | Age: 75
End: 2025-03-17
Payer: MEDICARE

## 2025-03-17 VITALS
OXYGEN SATURATION: 97 % | SYSTOLIC BLOOD PRESSURE: 102 MMHG | WEIGHT: 196 LBS | HEIGHT: 66 IN | HEART RATE: 78 BPM | DIASTOLIC BLOOD PRESSURE: 62 MMHG | BODY MASS INDEX: 31.5 KG/M2

## 2025-03-17 VITALS
WEIGHT: 196 LBS | DIASTOLIC BLOOD PRESSURE: 62 MMHG | BODY MASS INDEX: 31.5 KG/M2 | OXYGEN SATURATION: 97 % | SYSTOLIC BLOOD PRESSURE: 102 MMHG | HEIGHT: 66 IN | HEART RATE: 78 BPM

## 2025-03-17 DIAGNOSIS — I48.0 PAROXYSMAL ATRIAL FIBRILLATION (MULTI): ICD-10-CM

## 2025-03-17 DIAGNOSIS — E78.00 PURE HYPERCHOLESTEROLEMIA: Primary | ICD-10-CM

## 2025-03-17 DIAGNOSIS — M54.42 CHRONIC MIDLINE LOW BACK PAIN WITH LEFT-SIDED SCIATICA: Primary | ICD-10-CM

## 2025-03-17 DIAGNOSIS — E78.00 PURE HYPERCHOLESTEROLEMIA: ICD-10-CM

## 2025-03-17 DIAGNOSIS — I10 PRIMARY HYPERTENSION: ICD-10-CM

## 2025-03-17 DIAGNOSIS — Z12.31 SCREENING MAMMOGRAM FOR BREAST CANCER: ICD-10-CM

## 2025-03-17 DIAGNOSIS — G89.29 CHRONIC MIDLINE LOW BACK PAIN WITH LEFT-SIDED SCIATICA: Primary | ICD-10-CM

## 2025-03-17 DIAGNOSIS — Z12.12 ENCOUNTER FOR SCREENING FOR COLORECTAL CANCER IN HIGH RISK PATIENT: ICD-10-CM

## 2025-03-17 DIAGNOSIS — Z91.89 ENCOUNTER FOR SCREENING FOR COLORECTAL CANCER IN HIGH RISK PATIENT: ICD-10-CM

## 2025-03-17 DIAGNOSIS — G47.33 OSA (OBSTRUCTIVE SLEEP APNEA): ICD-10-CM

## 2025-03-17 DIAGNOSIS — L98.9 SKIN LESION: ICD-10-CM

## 2025-03-17 DIAGNOSIS — Z12.11 ENCOUNTER FOR SCREENING FOR COLORECTAL CANCER IN HIGH RISK PATIENT: ICD-10-CM

## 2025-03-17 PROCEDURE — 1160F RVW MEDS BY RX/DR IN RCRD: CPT | Performed by: INTERNAL MEDICINE

## 2025-03-17 PROCEDURE — 3078F DIAST BP <80 MM HG: CPT | Performed by: INTERNAL MEDICINE

## 2025-03-17 PROCEDURE — 1036F TOBACCO NON-USER: CPT | Performed by: INTERNAL MEDICINE

## 2025-03-17 PROCEDURE — UHSPHYS PR UH SELECT PHYSICAL: Performed by: INTERNAL MEDICINE

## 2025-03-17 PROCEDURE — 4010F ACE/ARB THERAPY RXD/TAKEN: CPT | Performed by: INTERNAL MEDICINE

## 2025-03-17 PROCEDURE — 3044F HG A1C LEVEL LT 7.0%: CPT | Performed by: INTERNAL MEDICINE

## 2025-03-17 PROCEDURE — 3048F LDL-C <100 MG/DL: CPT | Performed by: INTERNAL MEDICINE

## 2025-03-17 PROCEDURE — 1157F ADVNC CARE PLAN IN RCRD: CPT | Performed by: INTERNAL MEDICINE

## 2025-03-17 PROCEDURE — 3074F SYST BP LT 130 MM HG: CPT | Performed by: INTERNAL MEDICINE

## 2025-03-17 PROCEDURE — 1159F MED LIST DOCD IN RCRD: CPT | Performed by: INTERNAL MEDICINE

## 2025-03-17 PROCEDURE — G0439 PPPS, SUBSEQ VISIT: HCPCS | Performed by: INTERNAL MEDICINE

## 2025-03-17 RX ORDER — LISINOPRIL 10 MG/1
10 TABLET ORAL DAILY
Qty: 90 TABLET | Refills: 3 | Status: SHIPPED | OUTPATIENT
Start: 2025-03-17

## 2025-03-17 RX ORDER — PREDNISONE 10 MG/1
TABLET ORAL
Qty: 12 TABLET | Refills: 0 | Status: SHIPPED | OUTPATIENT
Start: 2025-03-17 | End: 2025-03-23

## 2025-03-17 RX ORDER — LISINOPRIL 10 MG/1
10 TABLET ORAL DAILY
Qty: 90 TABLET | Refills: 3 | Status: SHIPPED | OUTPATIENT
Start: 2025-03-17 | End: 2025-03-17 | Stop reason: SDUPTHER

## 2025-03-17 RX ORDER — TIZANIDINE 4 MG/1
4 TABLET ORAL 2 TIMES DAILY PRN
Qty: 30 TABLET | Refills: 1 | Status: SHIPPED | OUTPATIENT
Start: 2025-03-17

## 2025-03-17 ASSESSMENT — ENCOUNTER SYMPTOMS
LOSS OF SENSATION IN FEET: 1
DEPRESSION: 0
OCCASIONAL FEELINGS OF UNSTEADINESS: 0

## 2025-03-17 ASSESSMENT — ACTIVITIES OF DAILY LIVING (ADL)
MANAGING_FINANCES: INDEPENDENT
DOING_HOUSEWORK: INDEPENDENT
TAKING_MEDICATION: INDEPENDENT
GROCERY_SHOPPING: INDEPENDENT

## 2025-03-17 ASSESSMENT — PATIENT HEALTH QUESTIONNAIRE - PHQ9
1. LITTLE INTEREST OR PLEASURE IN DOING THINGS: NOT AT ALL
SUM OF ALL RESPONSES TO PHQ9 QUESTIONS 1 AND 2: 0
2. FEELING DOWN, DEPRESSED OR HOPELESS: NOT AT ALL

## 2025-03-17 NOTE — PROGRESS NOTES
Chief Complaint: Medicare Wellness Exam/Comprehensive Problem Focused Follow Up and Physical Exam    HPI:  Past medical history significant for  Hypertension for many years and adequate control with current regimen.  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 Alfaro she remains on metformin and now on Ozempic   1 mg   weekly with very good results has lost  40lbs in past year.    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   Also very involved with healthcare of her close friend Yudith who has multiple medical issues as well as social issues     Overall she really has been feeling pretty well however in the last months she has been having increased issues with low back pain sciatica pain radiating down the back of her left leg and last night she barely slept because of this  She has a new litter of puppies so she has been very active with moving up and down really very bothered by her pain she also has some pain in her right buttocks which seems to be worse with sitting any long distances she does have some numbness and tingling in the left leg but no weakness that she has noted      Active Problem  List      Comprehensive Medical/Surgical/Social/Family History  Past Medical History:   Diagnosis Date    Atrial fibrillation (Multi)     Diabetes 1.5, managed as type 2 (Multi)     Hypertension     ANTON (obstructive sleep apnea)      No past surgical history on file.  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      cholecalciferol (Vitamin D-3) 50 mcg (2,000 unit) capsule Take 1 capsule (50 mcg) by mouth early in the morning..      coffee xt-phosphatidyl serine (Neuriva Original) 100-100 mg capsule Take 1 capsule by mouth once daily.      fluticasone (Flonase) 50 mcg/actuation nasal spray Administer 2 sprays into each nostril once daily.      metoprolol succinate XL (Toprol-XL) 25 mg 24 hr tablet Take 1 tablet (25 mg) by mouth 2 times a day. Do not crush or chew. 180 tablet 3    multivit-min/iron/FA/vit K/lut (CENTRUM SILVER WOMEN ORAL) Take 1 tablet by mouth once daily.      nystatin (Mycostatin) 100,000 unit/gram powder Nystatin 676991 UNIT/GM External Powder APPLY 2-3 TIMES DAILY TO AFFECTED AREA 120 g 3    penicillin v potassium (Veetid) 500 mg tablet penicillin v potassium (Veetid) 500 mg tablet penicillin V potassium 500 mg tablet TAKE 2 TABS ONE HOUR PRIOR TO PROCEDURE, 1 TAB 6 HOURS POST  FOR DENTAL WORK 0 Active      rosuvastatin (Crestor) 10 mg tablet TAKE 1 TABLET BY MOUTH EVERY DAY 90 tablet 3    Xarelto 20 mg tablet TAKE 1 TABLET BY MOUTH DAILY WITH THE EVENING MEAL 90 tablet 3    [DISCONTINUED] hydroCHLOROthiazide (Microzide) 12.5 mg tablet TAKE 1 TABLET BY MOUTH DAILY 90 tablet 3    [DISCONTINUED] lisinopril 20 mg tablet TAKE 1 TABLET BY MOUTH EVERY DAY 90 tablet 3    FreeStyle Lizz 2 Sensor kit Use as instructed 2 each 3    lisinopril 10 mg tablet Take 1 tablet (10 mg) by mouth once daily. 90 tablet 3    predniSONE (Deltasone) 10 mg tablet Take 3 tablets (30 mg) by mouth once daily for 2 days, THEN 2 tablets (20 mg) once daily for 2 days, THEN 1 tablet (10 mg) once daily for 2 days. 12 tablet 0    semaglutide (Ozempic) 0.25 mg or 0.5 mg (2 mg/3 mL) pen injector INJECT 0.5 MG UNDER THE SKIN 1 (ONE) TIME PER WEEK IN THE EARLY MORNING.. 3 mL 2    semaglutide (Ozempic) 1 mg/dose (4 mg/3 mL) pen injector Inject 1 mg under the skin every 7 days. 3 mL 2    tiZANidine (Zanaflex) 4 mg tablet Take 1 tablet (4 mg) by mouth 2 times a day as needed for muscle spasms. 30 tablet 1    [DISCONTINUED] lisinopril 10 mg tablet Take 1 tablet (10 mg) by mouth once daily. 90 tablet 3     No current facility-administered medications on file prior to visit.       Medications and Supplements  prescribed by me and other practitioners or clinical pharmacist (such as prescriptions, OTC's, herbal therapies and supplements) were reviewed and documented in the medical record.    Tobacco/Alcohol/Opioid use, as well as Illicit Drug Use was screened for/reviewed and documented in Social History section and medication list as appropriate  Activities of Daily Living  In your present state of health, do you have any difficulty performing the following activities?:   Preparing food and eating?: No  Bathing yourself: No  Getting dressed: No  Using the toilet:No  Moving around from place to place: No  In the past year have you  "fallen or had a near fall?:No    Depression Screen  (Note: if answer to either of the following is \"Yes\", then a more complete depression screening is indicated)   Q1: Over the past two weeks, have you felt down, depressed or hopeless? No  Q2: Over the past two weeks, have you felt little interest or pleasure in doing things? No    Current exercise habits: The patient does not participate in regular exercise at present.   Dietary issues discussed: Yes  Hearing difficulties: Yes  Safe in current home environment: yes  Visual Acuity assessed: no  Cognitive Impairment assessed: no       Advance directives  Advanced Care Planning (including a Living Will, Healthcare POA, as well as specific end of life choices and/or directives), was discussed   Cardiac Risk Assessment  Cardiovascular risk was discussed and, if needed, lifestyle modifications recommended, including nutritional choices, exercise, and elimination of habits contributing to risk. We agreed on a plan to reduce the current cardiovascular risk based on above discussion as needed.  Aspirin use/disuse was discussed after reviewing the updated guidelines below:    Consider low dose Aspirin ( mg) use if the benefit for cardiovascular disease prevention outweighs risk for bleeding complications.   In general, low dose ASA should be considered:  In patients WITHOUT prior MI/stroke/PAD (primary prevention):   a. Age <60: Use if 10-year cardiovascular disease risk >20%, with discussion of risks and benefits with patient  b. Age 60-<70: Use if 10-year cardiovascular disease risk >20% and low bleeding (e.g., gastrointenstinal) risk, with discussion of risks and benefits with patient  c. Age >=70: Do not use    In patients WITH prior MI/stroke/PAD (secondary prevention):   Generally use unless extremely high bleeding (e.g., gastrointenstinal) risk, with discussion of risks and benefits with patient    ROS otherwise negative aside from what was mentioned above in " "HPI.    Vitals  /62   Pulse 78   Ht 1.676 m (5' 6\")   Wt 88.9 kg (196 lb)   SpO2 97%   BMI 31.64 kg/m²   Body mass index is 31.64 kg/m².  Physical Exam  Gen: Alert, NAD  HEENT:  PERRLA, EOMI, conjunctiva and sclera normal in appearance. External auditory canals/TMs normal; Oral cavity and posterior pharynx without lesions/exudate  Neck:  Supple with FROM; No masses/nodes palpable; Thyroid nontender and without nodules; No PILLO  Respiratory:  Lungs CTAB  Cardiovascular:  Heart RRR. No M/R/G. Peripheral pulses equal bilaterally  Abdomen:  Soft, nontender, BS present throughout; No R/G/R; No HSM or masses palpated  Extremities:  FROM all extremities; Muscle strength grossly normal with good tone  Neuro:  CN II-XII intact; Reflexes 2+/2+; Gross motor and sensory intact  Skin:  No suspicious lesions present  Breast: No masses, skin lesions or nipple discharges, no axillary lymphadenopathy    Assessment and Plan:  Problem List Items Addressed This Visit    None  #1 diabetes she is doing fantastic with her Ozempic and her A1c is now in a nondiabetic range she is fantastic continue current regimen continue follow-up with endocrinology  2.  Hypertension blood pressure is very well-controlled may be too well-controlled she is feeling somewhat lightheaded ready to decrease her lisinopril to 10 mg and stop the hydrochlorothiazide as well we will continue metoprolol as this is also for rate control she will try to monitor her blood pressure little more routinely at home and let us know if any further issues  3.  Paroxysmal atrial fibrillation clinically in sinus rhythm she follows routinely with cardiology continue Xarelto continue metoprolol  4.  Hypercholesteremia cholesterol under adequate control  5.  Low back pain sciatica significant DJD was noted on last x-ray about 2 years ago unable to take nonsteroidals because of her anticoagulation we will give her a steroid taper starting with prednisone 30 mg decreasing " by 10 mg every 2 days until complete we did discuss that her sugar may be somewhat elevated while taking also tizanidine 4 mg up to twice daily but mostly at bedtime if continued issues consider MRI gabapentin she has been to start physical therapy as well  6.  Right buttocks pain piriformis versus trochanteric bursitis we will see what happens with the steroid and PT  7.  Health maintenance  Congratulated her on healthy lifestyle  I have recommended increased routine regular exercise  She is up-to-date with immunizations  Mammogram referral been placed  Last colonoscopy was 2020 recommendation 5-year repeat so I placed referral for this  Dermatology for skin check  8.  Obstructive sleep apnea she uses CPAP        During the course of the visit the patient was educated and counseled about age appropriate screening and preventive services. Completed preventive screenings were documented in the chart and orders were placed for outstanding screenings/procedures as documented in the Assessment and Plan.      Patient Instructions (the written plan) was given to the patient at check out.      Tatum Peoples MD

## 2025-03-17 NOTE — PATIENT INSTRUCTIONS
It was good to see you.  You are doing a good job with your overall health care.   As we discussed we are going to start prednisone for your back pain as well as muscle relaxer tizanidine you can take this up to twice a day but it may make you drowsy.  I do want you to start physical therapy  If the pain persists feel MRI would be the next step as you did have significant arthritis we did x-ray even 2 years ago we could also try gabapentin for the pain as well  Your blood pressure is pretty low and therefore going to decrease your lisinopril to 10 mg daily please monitor your blood pressure at home as well if your systolic is consistently less than 110 and may decrease it to 5 mg  Also stop hydrochlorothiazide   Continue metoprolol at this time as this is for rate control as well  You are up-to-date with immunizations  You will be due for mammogram in mid April and referral has been placed  You are due for colonoscopy and this order has been placed as well  Blood work is all acceptable and hemoglobin A1c looks really good!  Routine regular exercise is recommended. American Cardiology Association recommended 100-120 mins weekly of aerobic exercise (exercise that increases your heart rate). In addition you should add resistance training (lifting weights/etc.).      General recommendations  I encourage you to stay active and healthy, and to follow these healthy habits:     Try to increase your intake of fish such as salmon and tuna and try to get 2 to 3 servings of fish per week.  Increase your intake of plant-based protein listed here:    Unprocessed nuts, walnuts, or almonds, Nuts and Seeds.      Green vegetables such as Broccoli, Peas, Greens, Spinach    Beans, Chickpeas, & Lentils    Other sources include Potatoes, Quinoa, Seaweed, Soymilk, Tempeh, and Tofu.  Increase food s higher in flavonoids found in black tea, green tea, apples, nuts, citrus fruit, berries, and dark chocolate.  You should avoid fried foods,  and sugary or starchy foods and sugary drinks, and avoid saturated fats.    Try not to dine at restaurants frequently and avoid fast food restaurants.      Try to get restful sleep approximately 7-9 hours every night.  Work towards getting 30 minutes of  moderate intensity exercise 4 to 5 days per week.  You should also try to exercise at least one hour per day with light walking.

## 2025-03-18 LAB
ALBUMIN SERPL BCP-MCNC: 3.9 G/DL (ref 3.4–5)
ALBUMIN SERPL BCP-MCNC: 4.3 G/DL (ref 3.4–5)
ALP SERPL-CCNC: 41 U/L (ref 33–136)
ALP SERPL-CCNC: 41 U/L (ref 33–136)
ALT SERPL W P-5'-P-CCNC: 13 U/L (ref 7–45)
ALT SERPL W P-5'-P-CCNC: 17 U/L (ref 7–45)
ANION GAP SERPL CALC-SCNC: 13 MMOL/L (ref 10–20)
ANION GAP SERPL CALC-SCNC: 15 MMOL/L (ref 10–20)
AST SERPL W P-5'-P-CCNC: 24 U/L (ref 9–39)
AST SERPL W P-5'-P-CCNC: 27 U/L (ref 9–39)
BILIRUB SERPL-MCNC: 0.6 MG/DL (ref 0–1.2)
BILIRUB SERPL-MCNC: 0.8 MG/DL (ref 0–1.2)
BUN SERPL-MCNC: 23 MG/DL (ref 6–23)
BUN SERPL-MCNC: 33 MG/DL (ref 6–23)
CALCIUM SERPL-MCNC: 8.9 MG/DL (ref 8.6–10.3)
CALCIUM SERPL-MCNC: 9.4 MG/DL (ref 8.6–10.3)
CHLORIDE SERPL-SCNC: 103 MMOL/L (ref 98–107)
CHLORIDE SERPL-SCNC: 104 MMOL/L (ref 98–107)
CHOLEST SERPL-MCNC: 155 MG/DL (ref 0–199)
CHOLESTEROL/HDL RATIO: 2.2
CO2 SERPL-SCNC: 25 MMOL/L (ref 21–32)
CO2 SERPL-SCNC: 27 MMOL/L (ref 21–32)
CREAT SERPL-MCNC: 0.88 MG/DL (ref 0.5–1.05)
CREAT SERPL-MCNC: 0.92 MG/DL (ref 0.5–1.05)
EGFRCR SERPLBLD CKD-EPI 2021: 65 ML/MIN/1.73M*2
EGFRCR SERPLBLD CKD-EPI 2021: 69 ML/MIN/1.73M*2
GLUCOSE SERPL-MCNC: 105 MG/DL (ref 74–99)
GLUCOSE SERPL-MCNC: 99 MG/DL (ref 74–99)
HDLC SERPL-MCNC: 71.5 MG/DL
LDLC SERPL CALC-MCNC: 71 MG/DL
NON HDL CHOLESTEROL: 84 MG/DL (ref 0–149)
POTASSIUM SERPL-SCNC: 3.9 MMOL/L (ref 3.5–5.3)
POTASSIUM SERPL-SCNC: 4.1 MMOL/L (ref 3.5–5.3)
PROT SERPL-MCNC: 6.2 G/DL (ref 6.4–8.2)
PROT SERPL-MCNC: 6.8 G/DL (ref 6.4–8.2)
SODIUM SERPL-SCNC: 139 MMOL/L (ref 136–145)
SODIUM SERPL-SCNC: 140 MMOL/L (ref 136–145)
TRIGL SERPL-MCNC: 63 MG/DL (ref 0–149)
VLDL: 13 MG/DL (ref 0–40)

## 2025-03-21 ENCOUNTER — TELEPHONE (OUTPATIENT)
Facility: CLINIC | Age: 75
End: 2025-03-21
Payer: MEDICARE

## 2025-03-21 DIAGNOSIS — R05.2 SUBACUTE COUGH: Primary | ICD-10-CM

## 2025-03-21 RX ORDER — PREDNISONE 20 MG/1
20 TABLET ORAL DAILY
Qty: 3 TABLET | Refills: 0 | Status: SHIPPED | OUTPATIENT
Start: 2025-03-21

## 2025-03-21 NOTE — TELEPHONE ENCOUNTER
She is on her last day of Prednisone. She was much better the first few days but now it really bad again. What to do?

## 2025-03-25 NOTE — PROGRESS NOTES
Subjective  Rachael Shah is a 74 y.o. 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    Current Plan  1. Nutrition: Mediterranean Diet. Is being more mindful when snacking.      2. Sleep:  Has ANTON       3. Stress: Stable     4. Exercise: None at this time      5. Appetite control: Stable  Obesity medication: Ozempic- 1mg weekly      6. Prior Goals: Partially Met     New Goals: Cut back on red meat.     Weight trend:    Wt Readings from Last 10 Encounters:   03/17/25 88.9 kg (196 lb)   03/17/25 88.9 kg (196 lb)   02/26/25 92.1 kg (203 lb)   07/30/24 97.3 kg (214 lb 8 oz)   06/25/24 100 kg (220 lb 8 oz)   04/15/24 103 kg (226 lb)   04/11/24 101 kg (222 lb)   02/29/24 106 kg (233 lb)   02/29/24 106 kg (233 lb)   02/15/24 107 kg (236 lb)         Current Outpatient Medications:     azelastine (Astelin) 137 mcg (0.1 %) nasal spray, Administer 2 sprays into each nostril 2 times a day., Disp: , Rfl:     cholecalciferol (Vitamin D-3) 50 mcg (2,000 unit) capsule, Take 1 capsule (50 mcg) by mouth early in the morning.., Disp: , Rfl:     coffee xt-phosphatidyl serine (Neuriva Original) 100-100 mg capsule, Take 1 capsule by mouth once daily., Disp: , Rfl:     fluticasone (Flonase) 50 mcg/actuation nasal spray, Administer 2 sprays into each nostril once daily., Disp: , Rfl:     FreeStyle Lizz 2 Sensor kit, Use as instructed, Disp: 2 each, Rfl: 3    lisinopril 10 mg tablet, Take 1 tablet (10 mg) by mouth once daily., Disp: 90 tablet, Rfl: 3    metoprolol succinate XL (Toprol-XL) 25 mg 24 hr tablet, Take 1 tablet (25 mg) by mouth 2 times a day. Do not crush or chew., Disp: 180 tablet, Rfl: 3    multivit-min/iron/FA/vit K/lut (CENTRUM SILVER WOMEN ORAL), Take 1 tablet by mouth once daily., Disp: , Rfl:     nystatin (Mycostatin) 100,000 unit/gram powder, Nystatin 334376 UNIT/GM External Powder APPLY 2-3 TIMES DAILY TO AFFECTED AREA, Disp: 120 g, Rfl: 3     penicillin v potassium (Veetid) 500 mg tablet, penicillin v potassium (Veetid) 500 mg tablet penicillin V potassium 500 mg tablet TAKE 2 TABS ONE HOUR PRIOR TO PROCEDURE, 1 TAB 6 HOURS POST FOR DENTAL WORK 0 Active, Disp: , Rfl:     predniSONE (Deltasone) 20 mg tablet, Take 1 tablet (20 mg) by mouth once daily., Disp: 3 tablet, Rfl: 0    rosuvastatin (Crestor) 10 mg tablet, TAKE 1 TABLET BY MOUTH EVERY DAY, Disp: 90 tablet, Rfl: 3    semaglutide (Ozempic) 0.25 mg or 0.5 mg (2 mg/3 mL) pen injector, INJECT 0.5 MG UNDER THE SKIN 1 (ONE) TIME PER WEEK IN THE EARLY MORNING.., Disp: 3 mL, Rfl: 2    semaglutide (Ozempic) 1 mg/dose (4 mg/3 mL) pen injector, Inject 1 mg under the skin every 7 days., Disp: 3 mL, Rfl: 2    tiZANidine (Zanaflex) 4 mg tablet, Take 1 tablet (4 mg) by mouth 2 times a day as needed for muscle spasms., Disp: 30 tablet, Rfl: 1    Xarelto 20 mg tablet, TAKE 1 TABLET BY MOUTH DAILY WITH THE EVENING MEAL, Disp: 90 tablet, Rfl: 3    ROS:  System: normal  Eyes : no visual changes  Neck : no tenderness, no new lumps/bumps  Respiratory : no SOB  Cardiovascular : no chest pain, no palpitations  Gastro-Intestinal : no abdominal concerns  Neurological : no numbness or tingling in the extremities  Musculoskeletal : no joint paint, no muscle pain  Skin : no unusual rashes  Psychiatric : no depression, no anxiety  See HPI for Endocrine ROS    Past Medical History:   Diagnosis Date    Atrial fibrillation (Multi)     Diabetes 1.5, managed as type 2 (Multi)     Hypertension     ANTON (obstructive sleep apnea)        No past surgical history on file.    Social History     Socioeconomic History    Marital status:      Spouse name: Not on file    Number of children: Not on file    Years of education: Not on file    Highest education level: Not on file   Occupational History    Not on file   Tobacco Use    Smoking status: Former     Types: Cigarettes    Smokeless tobacco: Never   Vaping Use    Vaping status: Never  Used   Substance and Sexual Activity    Alcohol use: Not on file    Drug use: Not on file    Sexual activity: Not on file   Other Topics Concern    Not on file   Social History Narrative    Originally from [place]    Went to [facility] 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 moved to [place] to be near [facility]   39 years ago   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  General : alert and oriented X3, no acute distress  Eyes : EOMI   BMI: 32.02kg/m2    Assessment/Plan   Rachael Shah is a 74 y.o. 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     1. Weight Management : Reviewed the principles of energy metabolism, caloric intake and expenditure, and rationale for a treatment program.  Also reinforced need for reduced calorie, low fat diet and increased physical activity.     We reviewed the possibility of starting an interdisciplinary lifestyle intervention program involving improvement of the diet, a personalized exercise program, efforts to reduce the stress and the possibility of using appetite suppressant medications in an effort to help with the weight loss process.  The patient expressed interest in the plan.     2. Nutrition : I discussed trying one of the diet approaches we have here in  the program : Mediterranean lifestyle, ketosis diet.  The patient will be referred to the Registered Dietician for education on their diet of choice.  The dietary booklet was provided in the visit today.     2/12/24: 236lb, 39.34kg/m2  6/25/24: 220.5lb, 35.59kg/m2  2/26/25: 203lb, 32.77kg/m2  3/26/25: 198.4lb, 32.02lg/m2     3. Sleep : Has ANTON - on CPAP     4. Stress : 5/10, retired, volunteers, , no children, has 4 dogs!    --has been on prednisone this week for back pain.    5. Exercise : trying to do bands.     6. Appetite : is high  Discussed AOM's  Would AVOID a stimulant - difficult to control Afib  Consider contrave.     7. DM2 : on metformin  Added ozempic 1mg/wk - doing well on this.     8. Goal: to cut down red meat intake.     Follow up in a group visit.  Sky Cruz MD

## 2025-03-26 ENCOUNTER — TELEPHONE (OUTPATIENT)
Dept: PRIMARY CARE | Facility: CLINIC | Age: 75
End: 2025-03-26

## 2025-03-26 ENCOUNTER — APPOINTMENT (OUTPATIENT)
Dept: ENDOCRINOLOGY | Facility: CLINIC | Age: 75
End: 2025-03-26
Payer: MEDICARE

## 2025-03-26 VITALS
TEMPERATURE: 97.3 F | WEIGHT: 198.4 LBS | SYSTOLIC BLOOD PRESSURE: 105 MMHG | HEART RATE: 73 BPM | BODY MASS INDEX: 31.88 KG/M2 | DIASTOLIC BLOOD PRESSURE: 67 MMHG | HEIGHT: 66 IN

## 2025-03-26 DIAGNOSIS — E66.811 CLASS 1 OBESITY: Primary | ICD-10-CM

## 2025-03-26 DIAGNOSIS — Z76.89 ENCOUNTER FOR WEIGHT MANAGEMENT: ICD-10-CM

## 2025-03-26 DIAGNOSIS — E11.9 TYPE 2 DIABETES MELLITUS WITHOUT COMPLICATION, WITHOUT LONG-TERM CURRENT USE OF INSULIN: ICD-10-CM

## 2025-03-26 DIAGNOSIS — Z00.00 ROUTINE HEALTH MAINTENANCE: ICD-10-CM

## 2025-03-26 PROCEDURE — 4010F ACE/ARB THERAPY RXD/TAKEN: CPT | Performed by: INTERNAL MEDICINE

## 2025-03-26 PROCEDURE — 3044F HG A1C LEVEL LT 7.0%: CPT | Performed by: INTERNAL MEDICINE

## 2025-03-26 PROCEDURE — 99215 OFFICE O/P EST HI 40 MIN: CPT | Performed by: INTERNAL MEDICINE

## 2025-03-26 PROCEDURE — 3048F LDL-C <100 MG/DL: CPT | Performed by: INTERNAL MEDICINE

## 2025-03-26 PROCEDURE — 3078F DIAST BP <80 MM HG: CPT | Performed by: INTERNAL MEDICINE

## 2025-03-26 PROCEDURE — 1159F MED LIST DOCD IN RCRD: CPT | Performed by: INTERNAL MEDICINE

## 2025-03-26 PROCEDURE — 1157F ADVNC CARE PLAN IN RCRD: CPT | Performed by: INTERNAL MEDICINE

## 2025-03-26 PROCEDURE — 3074F SYST BP LT 130 MM HG: CPT | Performed by: INTERNAL MEDICINE

## 2025-04-18 ENCOUNTER — EVALUATION (OUTPATIENT)
Dept: PHYSICAL THERAPY | Facility: CLINIC | Age: 75
End: 2025-04-18
Payer: MEDICARE

## 2025-04-18 DIAGNOSIS — M62.81 WEAKNESS OF TRUNK MUSCULATURE: ICD-10-CM

## 2025-04-18 DIAGNOSIS — G89.29 CHRONIC MIDLINE LOW BACK PAIN WITH LEFT-SIDED SCIATICA: Primary | ICD-10-CM

## 2025-04-18 DIAGNOSIS — M54.42 CHRONIC MIDLINE LOW BACK PAIN WITH LEFT-SIDED SCIATICA: Primary | ICD-10-CM

## 2025-04-18 DIAGNOSIS — R29.3 ABNORMAL POSTURE: ICD-10-CM

## 2025-04-18 DIAGNOSIS — M53.80 DECREASED RANGE OF MOTION OF SPINE: ICD-10-CM

## 2025-04-18 PROCEDURE — 97110 THERAPEUTIC EXERCISES: CPT | Mod: GP

## 2025-04-18 PROCEDURE — 97161 PT EVAL LOW COMPLEX 20 MIN: CPT | Mod: GP

## 2025-04-18 ASSESSMENT — ENCOUNTER SYMPTOMS
DEPRESSION: 0
OCCASIONAL FEELINGS OF UNSTEADINESS: 1
LOSS OF SENSATION IN FEET: 0

## 2025-04-18 NOTE — PROGRESS NOTES
Physical Therapy    Physical Therapy Evaluation and Treatment      Patient Name: Rachael Shah  MRN: [id number]  Today's Date: 4/18/2025    Time Entry:   Time Calculation  Start Time: 0816  Stop Time: 0905  Time Calculation (min): 49 min  PT Evaluation Time Entry  PT Evaluation (Low) Time Entry: 39  PT Therapeutic Procedures Time Entry  Therapeutic Exercise Time Entry: 10     Assessment:  Patient is a 74 year old woman who presents to Physical therapy secondary to chronic LBP with sciatica L>R. She reports constant LBP with intermittent LLE pain.  She presents with postural deviations and flattening of thoracic and lumbar curves.  Thoracolumbar AROM is markedly restricted in all planes, and painful primarily with flexion and rotation. She also presents with core and hip weakness, muscle restrictions in B piriformis and hamstrings muscles.  Resulting functional limitations include difficulty with prolonged sitting and driving, bending and lifting, poor sleep quality, difficulty caring for puppies.    Patient will benefit from skilled PT intervention to return to all ADLs, functional mobility and recreational activities symptom free including increased ease with caring for puppies.         Plan:  OP PT Plan  Treatment/Interventions: Cryotherapy, Education/ Instruction, Manual therapy, Therapeutic activities, Therapeutic exercises, Taping techniques  PT Plan: Skilled PT  PT Frequency: 2 times per week  Duration: 4-6 wks  Number of Treatments Authorized: Auth required  Rehab Potential: Good  Plan of Care Agreement: Patient    Current Problem:   1. Chronic midline low back pain with left-sided sciatica  Referral to Physical Therapy    Follow Up In Physical Therapy      2. Abnormal posture        3. Decreased range of motion of spine        4. 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 30.00 CO PAY, 100% COVERAGE, JOLIE ARAGON    Precautions:  Precautions  STEADI Fall Risk Score (The score of 4 or more indicates an increased risk of falling): 1  Precautions Comment: h/o A-fib and A-tachycardia, R TKA, L TSA, DM    Subjective:   Chief Complaint: Patient presents to clinic due to chronic midline LBP with left sciatica, and occasionally she has pain down RLE.  LLE can occas radiate into calf, but mainly in thigh. Intermittent LLE pain, constant LBP.  Sleeps in a chair, sometimes can only sleep about 3 hours at a time.  Reports she has peripheral neuropathy B feet , left worse than right.   Onset Date: 4-5 weeks  HEMANTH: chronic, but has a litter of puppies and may have been bending and lifting too much which could have aggravated symptoms.    Current Condition:   Worse    Pain:  Location: across LB, posterior left thigh  Description: sharp  Aggravating Factors:  bending, driving, lifting, lying flat in bed  Relieving Factors:  walking and standing  Currently rated 5/10,  worst 9/10,  best 2/10    Functional limitations:  Driving greater than 30 mins, bending, lifting.     Patient goals:  Relieve the pain and pressure in LB.     Previous diagnostic tests:  XR Lumbar 6/2023  Impression   Advanced diffuse lumbar degenerative changes at all levels.     Bulky anterior osteophytes suggest DISH.     Minimal S shaped scoliosis.       Previous Interventions/Treatments:   PT for hamstrings about 3-4 yrs ago at this facility.    Prior  Level of Function (PLOF)  Patient previously independent with all ADLs  Exercise/Physical Activity: showing and judging dogs, very involved with healthcare of her close friend who has Parkinsons  Work/School: retired, healthcare finance consulting    Patients Living Environment: Reviewed and no concern  Lives with .    Primary Language: English    Red Flags/Review of Systems:  (-) osteoporosis  (-) cough/sneeze  (-) balance difficulties/FALLS  (+) numbness/tingling  (+) weakness - generalized  (-) unremitting night pain  (+) AM stiffness  (-) unexplained weight loss - intentionally lost 40 lbs over the last year  (-) history of cancer  (-) bowel or bladder changes  (-) saddle paresthesias  (-) pacemaker  (+) metal implants - s/p R TKA, L TSA, plate in left wrist      Objective   Posture  Min  FH, B rounded and IR 'd shoulders, dowagers hump, decreased  thoracic kyphosis, decreased lumbar lordosis (flattened spinal curves)    Gait  Ambulates without assistive device with the following gait deviations:  BLE's Externally rotated, increased right lateral trunk shift, decreased left terminal knee extension at IC, decreased hip extension L>R.     Lumbar AROM  Flexion marked restriction, mod pain during motion and end range  Extension marked restriction, min pain during motion  Rotation R 50%, mod pain during motion  Rotation L 25% min pain during motion  Side Bend R 50%, mod pain during motion  Side Bend L very little pain at end range    Repeated movements  Repeated flexion in standing x 10 - increased pain in LB and B buttocks   Repeated extension in standing x 10 -  stiffness during motion, no worse  Repeated flexion in lying x 10 -  increased pain in LB  Repeated extension in lying - not tested     Strength  Hip flexion sitting B 4/5   Hip flexion supine B 3/5 weak and painful, decreased DLS  Bridges - only slight lift and severe pain   Hip abduction sidelying L 3-/5, R 3/5 - both painful  Knee extension B  "4/5  Knee flexion B 4/5  Ankle DF B 4+/5  Ankle PF B 4/5  Abdominals poor  Core/DLS poor  Trunk extensors  poor    ROM  Hip  Left hip WFL and painfree  Right hip WFL except IR to 10 deg, pain with ER and IR    Knee  Left  deg  Right 7 - 115 deg     Ankle  B WFL and painfree      Flexibility  Hamstrings L 56 deg, R 52 deg  Piriformis L fair plus, R fair   Hip flexors B poor  Gastrocs B fair    Neuro  Slump Test - B negative  Sensation -B LE intact to light touch   SLR - B negative     Outcome Measures:  Other Measures  Oswestry Disablity Index (SIMON): score 24, 48% disability     Treatments:  Evaluation completed  Therapeutic exercises 10 mins  Hooklying  TA bracing with deep breathing 5\" x 15  TA bracing with ball squeeze hip ADD 5\" x 15  B hip ABD bent knee fall outs with orange theraband 5\" x 10  TA bracing with alt R/L  bent knee lifts 5\" x 10    EDUCATION:  Written instructions and orange theraband provided for HEP.  Access Code: [email or web address]  URL: [email or web address]  Date: 04/18/2025  Prepared by: Cari Aquino    Program Notes  STOP ANY EXERCISE THAT CAUSES A LASTING INCREASE IN PAIN. DO ALL EXERCISES WITHIN PAINFREE RANGE OF MOTION AND TOLERANCE.     Exercises  - Supine Hip Adduction Isometric with Ball  - 1 x daily - 7 x weekly - 2 sets - 10 reps - 5\" hold  - Supine Transversus Abdominis Bracing - Hands on Stomach  - 1 x daily - 7 x weekly - 2 sets - 10 reps - 5\" hold  - Hooklying Clamshell with Resistance  - 1 x daily - 7 x weekly - 2 sets - 10 reps - 5\" hold  - Beginner Knee Fold  - 1 x daily - 7 x weekly - 2 sets - 10 reps - 5\" hold    Outpatient Education  Individual(s) Educated: Patient  Education Provided: Home Exercise Program, POC, Posture  Risk and Benefits Discussed with Patient/Caregiver/Other: yes  Patient/Caregiver Demonstrated Understanding: yes  Plan of Care Discussed and Agreed Upon: yes    Goals:  Active       PT Problem       Pain       Start:  04/18/25    " Expected End:  06/13/25       Patient will report reduction of pain by  80%  to ease performance of bending and lifting, and driving greater than 30 mins.         ROM       Start:  04/18/25    Expected End:  06/13/25       Patient will demonstrate 25% improvement in lumbar AROM all planes and without pain,  to ease the performance of ADLS, bending and lifting to care for puppies and dogs.          Strength       Start:  04/18/25    Expected End:  06/13/25       Patient will increase trunk and LE strength by 1/3 MMT grade to facilitate endurance for good postural alignment, and to provide spinal protection during activity and static positions.         HEP       Start:  04/18/25    Expected End:  06/13/25       Patient will be independent and compliant with safe and recommended  HEP to enhance functional progress and long term management of condition.           Outcome       Start:  04/18/25    Expected End:  06/13/25       Patient will improve outcome measures score on  SIMON by 15%  to show a clinically significant improvement  in functional mobility.              Date of Evaluation: 4/18/25  Onset Date: 3/6/25  Referring Physician: Dr. Tatum Aquino, PT  CPT Codes: Therapeutic Exercise: 87525, Therapeutic Activity: 28535, and Manual Therapy: 05349  Diagnosis:   Problem List Items Addressed This Visit           ICD-10-CM    Weakness of trunk musculature M62.81    Decreased range of motion of spine M53.80    Abnormal posture R29.3     Other Visit Diagnoses         Codes      Chronic midline low back pain with left-sided sciatica    -  Primary M54.42, G89.29    Relevant Orders    Follow Up In Physical Therapy          Outcome: Oswestry Disablity Index (SIMON): score 24, 48% disability   Autism: No  PT Evaluation Complexity: Low  87626,   Which of the following best describes the primary reason of therapy: Improving, restoring, or adapting functional mobility or skills  Surgery within last 3 months:  No  Select all conditions that apply: Arthritis Conditions  Visits Requested: 12    Social Determinants of health:   Money    No  physical home environment   No  no job/ work conditions         No  education/literacy                   No  Childhood experiences           No  social supports/coping skills  No  unhealthy behaviors                   No  Limited access to healthcare               No

## 2025-04-23 ENCOUNTER — TREATMENT (OUTPATIENT)
Dept: PHYSICAL THERAPY | Facility: CLINIC | Age: 75
End: 2025-04-23
Payer: MEDICARE

## 2025-04-23 DIAGNOSIS — M53.80 DECREASED RANGE OF MOTION OF SPINE: ICD-10-CM

## 2025-04-23 DIAGNOSIS — R29.3 ABNORMAL POSTURE: ICD-10-CM

## 2025-04-23 DIAGNOSIS — G89.29 CHRONIC MIDLINE LOW BACK PAIN WITH LEFT-SIDED SCIATICA: Primary | ICD-10-CM

## 2025-04-23 DIAGNOSIS — M62.81 WEAKNESS OF TRUNK MUSCULATURE: ICD-10-CM

## 2025-04-23 DIAGNOSIS — M54.42 CHRONIC MIDLINE LOW BACK PAIN WITH LEFT-SIDED SCIATICA: Primary | ICD-10-CM

## 2025-04-23 PROCEDURE — 97110 THERAPEUTIC EXERCISES: CPT | Mod: GP,CQ

## 2025-04-23 NOTE — PROGRESS NOTES
"Physical Therapy                                                                                  Physical Therapy Treatment       Patient name: Rachael Shah  MRN:   [id number]  Today's Date: 4/23/25     Time Calculation  Start Time: 0915  Stop Time: 0955  Time Calculation (min): 40 min  1. Chronic midline low back pain with left-sided sciatica  Follow Up In Physical Therapy      2. Abnormal posture        3. Decreased range of motion of spine        4. 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 30.00 CO PAY, 100% C[place]AGE, [place], ANTHEM  Precautions  h/o A-fib and A-tachycardia, R TKA, L TSA, DM   Assessment:  Pt . Tolerated session well without increase in pain or symptoms.   Plan:    OP PT Plan  Treatment/Interventions: Cryotherapy, Education/ Instruction, Manual therapy, Therapeutic activities, Therapeutic exercises, Taping techniques  PT Plan: Skilled PT  PT Frequency: 2 times per week  Duration: 4-6 wks  Number of Treatments Authorized: Auth required     Subjective: First waking in morning is when back feels most stiff , this does decrease with movement . Woke up 2 days ago with return of plantar fascitis \"on L instead of R thou \".  Since first session has not been experiencing L side radiating symptoms. Does note R glut pain   Response to last session:  ok   Performing HEP: yes  Reinaldo scale pt perceived exertion level " "4/somewhat hard   Pain   6/10  4/10 end of session    Objective:  Treatment:  Therapeutic exercise 40 minutes  TA bracing with hip Adduction  ball squeeze at knees 2 sets of 10 5\" holds  B hip ABD bent knee fall outs with green theraband 5\"  2 sets x 10  Hook lying clamshell green t-band resistance 10 reps   Supine Sequential march lifts 2 sets of 5   SKTC 20\" x 3 L/R each  LTR stretch 3 reps of 20\"  Seated trunk flexion stretch 3 reps x 10\" each  Seated piriformis stretch L and R  Seated Strap assist HS /calf stretch L/R  Education:  Current [facility]  Outpatient Education  Education Comment: [email or web address]  Access Code: [email or web address]  URL: [email or web address]  Date: 04/23/2025  Prepared by: Christa Ashby  Added 4/23 25  - Seated Piriformis Stretch  - 1 x daily - 7 x weekly - 1 sets - 4-6 reps - 20\"-30\" hold  - Seated Calf Stretch with Strap  - 1 x daily - 7 x weekly - 1 sets - 4-6 reps - 20-30\" hold  Program Notes  STOP ANY EXERCISE THAT CAUSES A LASTING INCREASE IN PAIN. DO ALL EXERCISES WITHIN PAINFREE RANGE OF MOTION AND TOLERANCE.     Exercises  - Supine Hip Adduction Isometric with Ball  - 1 x daily - 7 x weekly - 2 sets - 10 reps - 5\" hold  - Supine Transversus Abdominis Bracing - Hands on Stomach  - 1 x daily - 7 x weekly - 2 sets - 10 reps - 5\" hold  - Hooklying Clamshell with Resistance  - 1 x daily - 7 x weekly - 2 sets - 10 reps - 5\" hold  - Beginner Knee Fold  - 1 x daily - 7 x weekly - 2 sets - 10 reps - 5\" hold    Christa Ashby, PTA    Active       PT Problem       Pain       Start:  04/18/25    Expected End:  06/13/25       Patient will report reduction of pain by  80%  to ease performance of bending and lifting, and driving greater than 30 mins.         ROM       Start:  04/18/25    Expected End:  06/13/25       Patient will demonstrate 25% improvement in lumbar AROM all planes and without pain,  to ease the performance of ADLS, bending and lifting to care for puppies and dogs.     "      Strength       Start:  04/18/25    Expected End:  06/13/25       Patient will increase trunk and LE strength by 1/3 MMT grade to facilitate endurance for good postural alignment, and to provide spinal protection during activity and static positions.         HEP       Start:  04/18/25    Expected End:  06/13/25       Patient will be independent and compliant with safe and recommended  HEP to enhance functional progress and long term management of condition.           Outcome       Start:  04/18/25    Expected End:  06/13/25       Patient will improve outcome measures score on  SIMON by 15%  to show a clinically significant improvement  in functional mobility.

## 2025-04-28 DIAGNOSIS — M72.2 PLANTAR FASCIITIS: ICD-10-CM

## 2025-04-30 ENCOUNTER — TREATMENT (OUTPATIENT)
Dept: PHYSICAL THERAPY | Facility: CLINIC | Age: 75
End: 2025-04-30
Payer: MEDICARE

## 2025-04-30 DIAGNOSIS — M62.81 WEAKNESS OF TRUNK MUSCULATURE: ICD-10-CM

## 2025-04-30 DIAGNOSIS — M53.80 DECREASED RANGE OF MOTION OF SPINE: ICD-10-CM

## 2025-04-30 DIAGNOSIS — M54.42 CHRONIC MIDLINE LOW BACK PAIN WITH LEFT-SIDED SCIATICA: Primary | ICD-10-CM

## 2025-04-30 DIAGNOSIS — G89.29 CHRONIC MIDLINE LOW BACK PAIN WITH LEFT-SIDED SCIATICA: Primary | ICD-10-CM

## 2025-04-30 DIAGNOSIS — R29.3 ABNORMAL POSTURE: ICD-10-CM

## 2025-04-30 PROCEDURE — 97110 THERAPEUTIC EXERCISES: CPT | Mod: GP,CQ

## 2025-04-30 NOTE — PROGRESS NOTES
Physical Therapy                                                                                  Physical Therapy Treatment       Patient name: Rachael Shah  MRN:   [id number]  Today's Date: 4/30/25     Time Calculation  Start Time: 0830  Stop Time: 0913  Time Calculation (min): 43 min  1. Chronic midline low back pain with left-sided sciatica  Follow Up In Physical Therapy      2. Abnormal posture        3. Decreased range of motion of spine        4. 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 30.00 CO PAY, 100% COVERAGE, [place], ANTHEM  Precautions Comment: h/o A-fib and A-tachycardia, R TKA, L TSA, DM   Assessment:  Gradually increasing reps pt absorbing without increase in symptoms. Now has added script of plantar fascitis to PT , has been scheduled for eval on 5/15   Plan:    OP PT Plan  Treatment/Interventions: Cryotherapy, Education/ Instruction, Manual therapy, Therapeutic activities, Therapeutic exercises, Taping techniques  PT Plan: Skilled PT  PT Frequency: 2 times per week  Duration: 4-6 wks  Number of Treatments Authorized: Auth required     Subjective: Wakes with pain in back and buttocks , general movement does not decrease symptoms .  Response to last session:   good  Performing HEP: yes, not this morning    Pain   4/10   No change end of session  Objective:  Treatment:  Therapeutic Exercise  "43 minutes  Sci fit level 1 x 8 minutes   Seated strap assist calf stretch 30\" x 4   TA bracing 10 reps , 5\" holds   Hook lying hip Adduction ball squeeze at knees 2 sets 10   Hook lying clamshell green t-band resistance 10 reps 2 sets   Supine Sequential march lifts green t-band 20 reps     SKTC 1 minute L/R each  LTR stretch 3 reps of 20\"  Seated trunk flexion stretch 3 reps x 10\" each  Seated piriformis stretch L and R    Education:  Outpatient Education  Education Comment: [id number]  No additions to HEP  Christa Ashby, PTA    Active       PT Problem       Pain       Start:  04/18/25    Expected End:  06/13/25       Patient will report reduction of pain by  80%  to ease performance of bending and lifting, and driving greater than 30 mins.         ROM       Start:  04/18/25    Expected End:  06/13/25       Patient will demonstrate 25% improvement in lumbar AROM all planes and without pain,  to ease the performance of ADLS, bending and lifting to care for puppies and dogs.          Strength       Start:  04/18/25    Expected End:  06/13/25       Patient will increase trunk and LE strength by 1/3 MMT grade to facilitate endurance for good postural alignment, and to provide spinal protection during activity and static positions.         HEP       Start:  04/18/25    Expected End:  06/13/25       Patient will be independent and compliant with safe and recommended  HEP to enhance functional progress and long term management of condition.           Outcome       Start:  04/18/25    Expected End:  06/13/25       Patient will improve outcome measures score on  SIMON by 15%  to show a clinically significant improvement  in functional mobility.            "

## 2025-05-13 ENCOUNTER — TREATMENT (OUTPATIENT)
Dept: PHYSICAL THERAPY | Facility: CLINIC | Age: 75
End: 2025-05-13
Payer: MEDICARE

## 2025-05-13 DIAGNOSIS — G89.29 CHRONIC MIDLINE LOW BACK PAIN WITH LEFT-SIDED SCIATICA: Primary | ICD-10-CM

## 2025-05-13 DIAGNOSIS — M53.80 DECREASED RANGE OF MOTION OF SPINE: ICD-10-CM

## 2025-05-13 DIAGNOSIS — R29.3 ABNORMAL POSTURE: ICD-10-CM

## 2025-05-13 DIAGNOSIS — M62.81 WEAKNESS OF TRUNK MUSCULATURE: ICD-10-CM

## 2025-05-13 DIAGNOSIS — M54.42 CHRONIC MIDLINE LOW BACK PAIN WITH LEFT-SIDED SCIATICA: Primary | ICD-10-CM

## 2025-05-13 PROCEDURE — 97110 THERAPEUTIC EXERCISES: CPT | Mod: GP,CQ

## 2025-05-13 NOTE — PROGRESS NOTES
Physical Therapy                                                                                  Physical Therapy Treatment       Patient name: Rachael Shah  MRN:   [id number]  Today's Date: 5/13/25     Time Calculation  Start Time: 1730  Stop Time: 1820  Time Calculation (min): 50 min  1. Chronic midline low back pain with left-sided sciatica  Follow Up In Physical Therapy      2. Abnormal posture        3. Decreased range of motion of spine        4. 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 30.00 CO PAY, 100% COVERAGE, [place], ANTHEM  Precautions : h/o A-fib and A-tachycardia, R TKA, L TSA, DM   Assessment:  Pt does not have the upper body strength to self roll HS on foam roller. Moderately challenged with exercise completed today. Responding more favorably to flexion vs extension positions for back.  Plan:    OP PT Plan  Treatment/Interventions: Cryotherapy, Education/ Instruction, Manual therapy, Therapeutic activities, Therapeutic exercises, Taping techniques  PT Plan: Skilled PT  PT Frequency: 2 times per week  Duration: 4-6 wks  Number of Treatments Authorized: Auth required     Subjective: The back has been pretty decent . HS so so tight  gets worse when transitioning from sitting to standing causing pain to walk eventually ' I can walk it out '.  Mornings have been better . Did have to postpone eval on PF is  "wearing a night splint on L foot . Added arch support now using 2 in L foot helps 30-40%   Will be OOT end of month driving to [place] for dog show event , will be standing involved .  Prone position less than comfortable on back as compared to supine  Response to last session: ok    Performing HEP: yes  Reinaldo perceived exertion rating 4/somewhat hard   Pain   2-3/10  End of session back ok , HS tightening up upon WB ing  Objective:  Treatment:  Therapeutic Exercise 50 minutes  Sci fit level 1 X 9  minutes seat 11     Instruction /demo /Trial of foam roller for HS tightness difficult for pt to lift weight through UE s  Had pt position prone and provided 3-4 minutes of foam roller STM     TA bracing 10 reps , 5\" holds   Hook lying hip Adduction ball squeeze at knees 2 sets 10   Hook lying clamshell green t-band resistance 10 reps 2 sets   Supine Sequential march lifts green t-band 20 reps     SKTC 1 minute L/R each  LTR stretch 3 reps of 20\"  Seated trunk flexion stretch 3 reps x 10\" each  Seated HS stretch 15\" x 4  Seated piriformis stretch L and R 20\" x 3 L/R  Seated strap assist calf stretch 15\" x 2     Education:  Outpatient Education  Education Comment: [id number]  No additions to HEP    Christa Ashby, PTA    Active       PT Problem       Pain       Start:  04/18/25    Expected End:  06/13/25       Patient will report reduction of pain by  80%  to ease performance of bending and lifting, and driving greater than 30 mins.         ROM       Start:  04/18/25    Expected End:  06/13/25       Patient will demonstrate 25% improvement in lumbar AROM all planes and without pain,  to ease the performance of ADLS, bending and lifting to care for puppies and dogs.          Strength       Start:  04/18/25    Expected End:  06/13/25       Patient will increase trunk and LE strength by 1/3 MMT grade to facilitate endurance for good postural alignment, and to provide spinal protection during activity and static positions. "         HEP       Start:  04/18/25    Expected End:  06/13/25       Patient will be independent and compliant with safe and recommended  HEP to enhance functional progress and long term management of condition.           Outcome       Start:  04/18/25    Expected End:  06/13/25       Patient will improve outcome measures score on  SIMON by 15%  to show a clinically significant improvement  in functional mobility.

## 2025-05-15 ENCOUNTER — APPOINTMENT (OUTPATIENT)
Dept: PHYSICAL THERAPY | Facility: CLINIC | Age: 75
End: 2025-05-15
Payer: MEDICARE

## 2025-05-15 DIAGNOSIS — R29.3 ABNORMAL POSTURE: Primary | ICD-10-CM

## 2025-05-15 DIAGNOSIS — M62.81 WEAKNESS OF TRUNK MUSCULATURE: ICD-10-CM

## 2025-05-15 DIAGNOSIS — M53.80 DECREASED RANGE OF MOTION OF SPINE: ICD-10-CM

## 2025-05-21 ENCOUNTER — TREATMENT (OUTPATIENT)
Dept: PHYSICAL THERAPY | Facility: CLINIC | Age: 75
End: 2025-05-21
Payer: MEDICARE

## 2025-05-21 DIAGNOSIS — G89.29 CHRONIC MIDLINE LOW BACK PAIN WITH LEFT-SIDED SCIATICA: ICD-10-CM

## 2025-05-21 DIAGNOSIS — M54.42 CHRONIC MIDLINE LOW BACK PAIN WITH LEFT-SIDED SCIATICA: ICD-10-CM

## 2025-05-21 DIAGNOSIS — M53.80 DECREASED RANGE OF MOTION OF SPINE: ICD-10-CM

## 2025-05-21 DIAGNOSIS — M62.81 WEAKNESS OF TRUNK MUSCULATURE: ICD-10-CM

## 2025-05-21 DIAGNOSIS — R29.3 ABNORMAL POSTURE: Primary | ICD-10-CM

## 2025-05-21 PROCEDURE — 97110 THERAPEUTIC EXERCISES: CPT | Mod: GP,CQ

## 2025-05-21 PROCEDURE — 97140 MANUAL THERAPY 1/> REGIONS: CPT | Mod: GP,CQ

## 2025-05-21 NOTE — PROGRESS NOTES
Physical Therapy                                                                                  Physical Therapy Treatment       Patient name: Rachael Shah  MRN:   [id number]  Today's Date: 5/21/25     Time Calculation  Start Time: 1330  Stop Time: 1415  Time Calculation (min): 45 min  1. Abnormal posture        2. Chronic midline low back pain with left-sided sciatica  Follow Up In Physical Therapy      3. Decreased range of motion of spine        4. 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 30.00 CO PAY, 100% [place], [place]  Precautions Comment: h/o A-fib and A-tachycardia, R TKA, L TSA, DM   Assessment:  Spent extra attention on HS stretching and encouraged to work on at home . Pt has long trip driving coming up reminded about importance of several rest breaks . Back is better with minimal to no symptoms worst is with transitions sit-> stand and supine <-> sit . May benefit from revisiting bed mobility to ease transfer   Plan:    OP PT Plan  Treatment/Interventions: Cryotherapy, Education/ Instruction, Manual therapy, Therapeutic activities, Therapeutic exercises, Taping techniques  PT Plan: Skilled PT  PT Frequency: 2 times per week  Duration: 4-6 wks  Number of Treatments Authorized: Auth required     Subjective: R  Hamstrings  muscle was a  little better but today is up to 4/10 in  "tightness.  Feels improvement in being able to bend and lift  at about 20% . No change in driving distance which hovers at 30 minute ,  Back has 'stabilized ' ,R HS cramps up   Back  is tight first thing in morning but 'loosened up ' with getting up and moving around.  Response to last session:   good  Performing HEP: yes    Pain   0/10 back  Objective:  Treatment:  Therapeutic Exercise 35 minutes  Sci fit level 1 X 10  minutes seat 11   Seated HS stretch 30\" x 4     TA bracing 10 reps , 5\" holds   Hook lying hip Adduction ball squeeze at knees 3-5\" 2 sets 10   Hook lying clamshell green t-band resistance 10 reps 2 sets   Supine Sequential march lifts green t-band 20 reps    Supine HS stretch strap assist 30\" x 4 LLE, 2 X 30\" RLE       Manual technique 10 minutes    using knobby roller to HS and calves  R and L  pt prone   Education:  Outpatient Education  Education Comment: [id number]  Alternative set ups for HS stretching with and without strap   Christa Ashby, PTA        "

## 2025-05-23 ENCOUNTER — TELEPHONE (OUTPATIENT)
Dept: ENDOCRINOLOGY | Facility: CLINIC | Age: 75
End: 2025-05-23

## 2025-05-23 ENCOUNTER — TREATMENT (OUTPATIENT)
Dept: PHYSICAL THERAPY | Facility: CLINIC | Age: 75
End: 2025-05-23
Payer: MEDICARE

## 2025-05-23 DIAGNOSIS — M62.81 WEAKNESS OF TRUNK MUSCULATURE: ICD-10-CM

## 2025-05-23 DIAGNOSIS — R29.3 ABNORMAL POSTURE: Primary | ICD-10-CM

## 2025-05-23 DIAGNOSIS — G89.29 CHRONIC MIDLINE LOW BACK PAIN WITH LEFT-SIDED SCIATICA: ICD-10-CM

## 2025-05-23 DIAGNOSIS — E11.9 TYPE 2 DIABETES MELLITUS WITHOUT COMPLICATION, WITHOUT LONG-TERM CURRENT USE OF INSULIN: ICD-10-CM

## 2025-05-23 DIAGNOSIS — M53.80 DECREASED RANGE OF MOTION OF SPINE: ICD-10-CM

## 2025-05-23 DIAGNOSIS — M54.42 CHRONIC MIDLINE LOW BACK PAIN WITH LEFT-SIDED SCIATICA: ICD-10-CM

## 2025-05-23 PROCEDURE — 97140 MANUAL THERAPY 1/> REGIONS: CPT | Mod: GP,CQ

## 2025-05-23 PROCEDURE — 97110 THERAPEUTIC EXERCISES: CPT | Mod: GP,CQ

## 2025-05-23 RX ORDER — SEMAGLUTIDE 1.34 MG/ML
1 INJECTION, SOLUTION SUBCUTANEOUS
Qty: 9 ML | Refills: 1 | Status: SHIPPED | OUTPATIENT
Start: 2025-05-23

## 2025-05-23 NOTE — PROGRESS NOTES
Physical Therapy                                                                                  Physical Therapy Treatment       Patient name: Rachael Shah  MRN:   [id number]  Today's Date: 5/23/25     Time Calculation  Start Time: 0831  Stop Time: 0924  Time Calculation (min): 53 min  1. Abnormal posture        2. Chronic midline low back pain with left-sided sciatica  Follow Up In Physical Therapy      3. Decreased range of motion of spine        4. 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 30.00 CO PAY, 100% [place], [place], [place]  Precautions Comment: h/o A-fib and A-tachycardia, R TKA, L TSA, DM               Assessment:  Pain of plantar fascitis likely directly affecting back pain and stiffness due to antalgic gait pattern.  No HS cramping experienced sense previous session. Did not struggle with bed mobility today  Plan:      OP PT Plan  Treatment/Interventions: Cryotherapy, Education/ Instruction, Manual therapy, Therapeutic activities, Therapeutic exercises, Taping techniques  PT Plan: Skilled PT  PT Frequency: 2 times per week  Duration: 4-6 wks  Number of Treatments Authorized: Auth required     Subjective: Starts morning very stiff in back which does not subside with moving .  Plantar fascitis very painful but is not going to be formally evaluated (for PT intervention) until return from [place] leaving  "Wednesday 5/28 (driving to /back [place] dog show event)   Response to last session:   good   Performing HEP: routinely     Pain   Back 3/10    Objective:  Treatment:  Therapeutic Exercise 43  Sci fit level 1 X 10  minutes seat 11   Seated HS stretch 30\" x 4      TA bracing 10 reps , 5\" holds   Hook lying hip Adduction ball squeeze at knees 5\" 2 sets 10   Hook lying clamshell blue t-band resistance 10 reps 2 sets     Piriformis stretch push away 20\" x 3 R/L  SKTC 20\" x 3 L/R each    Supine HS stretch with towel support 30\" x 2 bilaterally    Supine Sequential march lifts green t-band 1 , without resistance 10 reps  reps  SB DKTC 10\" holds x 10 reps     Manual technique 10 minutes    using knobby roller to HS and calves  R and L  pt prone    Education:     Re instruct in exer with focus on HS stretching  Christa Ashby, PTA    Active       PT Problem       Pain       Start:  04/18/25    Expected End:  06/13/25       Patient will report reduction of pain by  80%  to ease performance of bending and lifting, and driving greater than 30 mins.         ROM       Start:  04/18/25    Expected End:  06/13/25       Patient will demonstrate 25% improvement in lumbar AROM all planes and without pain,  to ease the performance of ADLS, bending and lifting to care for puppies and dogs.          Strength       Start:  04/18/25    Expected End:  06/13/25       Patient will increase trunk and LE strength by 1/3 MMT grade to facilitate endurance for good postural alignment, and to provide spinal protection during activity and static positions.         HEP       Start:  04/18/25    Expected End:  06/13/25       Patient will be independent and compliant with safe and recommended  HEP to enhance functional progress and long term management of condition.           Outcome       Start:  04/18/25    Expected End:  06/13/25       Patient will improve outcome measures score on  SIMON by 15%  to show a clinically significant improvement  in " functional mobility.

## 2025-05-27 ENCOUNTER — TREATMENT (OUTPATIENT)
Dept: PHYSICAL THERAPY | Facility: CLINIC | Age: 75
End: 2025-05-27
Payer: MEDICARE

## 2025-05-27 DIAGNOSIS — R29.3 ABNORMAL POSTURE: ICD-10-CM

## 2025-05-27 DIAGNOSIS — M62.81 WEAKNESS OF TRUNK MUSCULATURE: ICD-10-CM

## 2025-05-27 DIAGNOSIS — M54.42 CHRONIC MIDLINE LOW BACK PAIN WITH LEFT-SIDED SCIATICA: Primary | ICD-10-CM

## 2025-05-27 DIAGNOSIS — G89.29 CHRONIC MIDLINE LOW BACK PAIN WITH LEFT-SIDED SCIATICA: Primary | ICD-10-CM

## 2025-05-27 DIAGNOSIS — M53.80 DECREASED RANGE OF MOTION OF SPINE: ICD-10-CM

## 2025-05-27 PROCEDURE — 97110 THERAPEUTIC EXERCISES: CPT | Mod: GP

## 2025-05-27 PROCEDURE — 97530 THERAPEUTIC ACTIVITIES: CPT | Mod: GP

## 2025-05-27 NOTE — PROGRESS NOTES
Physical Therapy                                                                                  Physical Therapy Treatment /PT Re-Check      Patient name: Rachael Shah  MRN:   [id number]  Today's Date: 5/27/25  7      Time Calculation  Start Time: 1347  Stop Time: 1430  Time Calculation (min): 43 min    Assessment:  Patient  has completed 7 visits of PT consisting of therapeutic exercises for stretching and strengthening, as well as manual therapy to increase tissue extensibility.  Patient reports she no longer experiences the shooting pain down BLE's.  She is able to squat to  puppies and return to upright with less pain and greater ease.  She is also able to lift about 15 lbs.  She continues to experience intermittent pain  in right posterior thigh, left heel and across LB, notably with sitting/driving greater than 30 mins and in the AM when getting out of bed.   She continues to present with painful and restricted spinal AROM, weakness in hips and core stabilizer muscles, and muscles restrictions limiting her tolerance for functional activity and mobility.     The patient will benefit from skilled PT services to address above listed impairments/deficits in order to improve functional abilities, decrease pain and return to PLOF and improve QoL.      Plan:    Update HEP.  Progress trunk extension ROM/strengthening and hip strengthening.    Monitor response to treatment and adjust plan as needed.   To continue with current POC.     Current Problems:       1. Chronic midline low back pain with left-sided sciatica  Follow Up In Physical Therapy      2. Abnormal posture        3. Decreased range of motion of spine        4. 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 30.00 CO PAY, 100% [place], [place], [place])    Precautions  Precautions Comment: h/o A-fib and A-tachycardia, R TKA, L TSA, DM    Subjective:  Patient reported pain right hamstring, left heel and across LB, all rated 5/10 at present.     Improvements reported include the pain shooting down B legs has stopped.  Able to squat to  puppies and return to upright with less pain and greater ease.  Able to lift about 15 lbs without having a problem.  Driving still limited to 30 mins , then gets right hamstrings pain.  Shooting sharp pain in AM BLE's when getting out of bed. Hamstring pain is intermittent. Worst pain 6/10. Reports she feels therapy is helping.  Patient reports she will be driving to [place] for a dog show on 5/28/25.   Response to last session:  better   Performing HEP: yes    Pain  5/10 at the start of session.      Objective:  Posture  Min  FH, B rounded and IR 'd shoulders, dowagers hump, decreased  thoracic kyphosis, decreased lumbar lordosis (flattened spinal curves).  Deviations persist.     Gait  Ambulates without assistive device community distances with normal amairani with the following gait deviations:  BLE's Externally rotated, increased right lateral trunk shift, decreased left terminal knee extension at IC, decreased hip extension L>R.      Lumbar AROM  (PDM denotes pain during motion;  ERP denotes end range pain)  Flexion marked restriction, mod PDM and ERP  Extension marked restriction, min PDM  Rotation R 50%, mod  "PDM  Rotation L 25% min PDM  Side Bend R 50%, mod PDM  Side Bend L very little ERP      Repeated movements  Repeated flexion in standing x 10 - increased pain in LB and B buttocks, right hamstrings   Repeated extension in standing x 10 -  stiffness during motion, no worse  Repeated flexion in lying x 10 -  increased pain in LB  Repeated extension in lying - not tested patient unable to lie on stomach  Repeated APT in sitting - limited mobility but no worse     Strength  Hip flexion sitting B 4+/5   Hip flexion supine B 3/5 weak and painful, decreased DLS  Bridges - 1-2\" off mat and severe pain   Hip abduction sidelying L 3-/5, R 3/5 - both painful  Knee extension B 4+/5  Knee flexion B 4/5  Ankle DF B 4+/5  Ankle PF B 4/5  Abdominals poor  Core/DLS poor  Trunk extensors  poor     ROM  Hip  Left hip WFL and painfree, except lacking full hip extensio to neutral in supine.  Right hip WFL except IR to 10 deg, pain with ER and IR     Knee  Left  deg A  Right 6  - 115 deg      Flexibility  Hamstrings L 54 deg, R 58 deg  Piriformis L fair plus, R good  Hip flexors B poor  Gastrocs B fair plus     Outcome Measures:  Other Measures  Oswestry Disablity Index (SIMON): score 26, 52% (increased from 48%)        Treatment:    Therapeutic Activities 23 mins  Re-eval completed this date.  See Subjective and Goal status/comments for details.     Therapeutic Exercise 20 mins  Sci fit level 1.3  X 10  minutes seat 11    Seated on edge of mat  -posture correction  with APT<> neutral spine 3\" hold 2 x 10 reps with manual and verbal cues for correct performance (patient very limited in mobility into lumbar extension)    Standing    Lumbar extension back bends with SB at the wall 3\" hold  2 x 10    Education:  Educated patient in use of pillow or towel roll behind LB while sitting and driving to maintain  neutral spine alignment.   Patient encouraged to stop every 2 hours on her road trip to get up and move around and perform standing " back bends.    Continue with current HEP as previously instructed.   Access Code: [email or web address]  URL: [email or web address]    Plan to update HEP within 1-2 visits to include standing trunk and hip strengthening.       Goals    Active       PT Problem       Pain (Progressing)       Start:  04/18/25    Expected End:  06/13/25       Patient will report reduction of pain by  80%  to ease performance of bending and lifting, and driving greater than 30 mins.         ROM (Progressing)       Start:  04/18/25    Expected End:  06/13/25       Patient will demonstrate 25% improvement in lumbar AROM all planes and without pain,  to ease the performance of ADLS, bending and lifting to care for puppies and dogs.          Strength (Progressing)       Start:  04/18/25    Expected End:  06/13/25       Patient will increase trunk and LE strength by 1/3 MMT grade to facilitate endurance for good postural alignment, and to provide spinal protection during activity and static positions.         HEP (Progressing)       Start:  04/18/25    Expected End:  06/13/25       Patient will be independent and compliant with safe and recommended  HEP to enhance functional progress and long term management of condition.           Outcome (Not Progressing)       Start:  04/18/25    Expected End:  06/13/25       Patient will improve outcome measures score on  SIMON by 15%  to show a clinically significant improvement  in functional mobility.            Date of Evaluation: 4/18/25  Onset Date: 3/17/25  Referring Physician: Dr. Tatum Aquino, PT  CPT Codes: Therapeutic Exercise: 51751, Therapeutic Activity: 23414, and Manual Therapy: 67538  Diagnosis:   Problem List Items Addressed This Visit           ICD-10-CM    Weakness of trunk musculature M62.81    Decreased range of motion of spine M53.80    Abnormal posture R29.3     Other Visit Diagnoses         Codes      Chronic midline low back pain with left-sided  sciatica    -  Primary M54.42, G89.29          Outcome: Oswestry Disablity Index (SIMON): score 26, 52%   Autism: No  PT Evaluation Complexity: Low  64594, Mod 40676, High 92036  Which of the following best describes the primary reason of therapy: Improving, restoring, or adapting functional mobility or skills  Surgery within last 3 months: No  Select all conditions that apply: BMI > 40, Diabetes Mellitus, and Arthritis Conditions  Visits Requested: 8    Social Determinants of health:   Money    No  physical home environment   No  no job/ work conditions         No  education/literacy                   No  Childhood experiences           No  social supports/coping skills  No  unhealthy behaviors                   No  Limited access to healthcare               No

## 2025-06-04 ENCOUNTER — TREATMENT (OUTPATIENT)
Dept: PHYSICAL THERAPY | Facility: CLINIC | Age: 75
End: 2025-06-04
Payer: MEDICARE

## 2025-06-04 DIAGNOSIS — M53.80 DECREASED RANGE OF MOTION OF SPINE: ICD-10-CM

## 2025-06-04 DIAGNOSIS — G89.29 CHRONIC MIDLINE LOW BACK PAIN WITH LEFT-SIDED SCIATICA: Primary | ICD-10-CM

## 2025-06-04 DIAGNOSIS — M62.81 WEAKNESS OF TRUNK MUSCULATURE: ICD-10-CM

## 2025-06-04 DIAGNOSIS — I48.0 PAROXYSMAL ATRIAL FIBRILLATION (MULTI): ICD-10-CM

## 2025-06-04 DIAGNOSIS — R29.3 ABNORMAL POSTURE: ICD-10-CM

## 2025-06-04 DIAGNOSIS — M54.42 CHRONIC MIDLINE LOW BACK PAIN WITH LEFT-SIDED SCIATICA: Primary | ICD-10-CM

## 2025-06-04 PROCEDURE — 97110 THERAPEUTIC EXERCISES: CPT | Mod: GP,CQ

## 2025-06-04 NOTE — PROGRESS NOTES
Physical Therapy                                                                                  Physical Therapy Treatment       Patient name: Rachael Shah  MRN:   [id number]  Today's Date: 6/4/25     Time Calculation  Start Time: 0832  Stop Time: 0915  Time Calculation (min): 43 min  1. Chronic midline low back pain with left-sided sciatica  Follow Up In Physical Therapy      2. Abnormal posture        3. Decreased range of motion of spine        4. 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 Introduced trunk extension based exercise and except for 'getting in/out position' faired well  Plan:    OP PT Plan  Treatment/Interventions: Cryotherapy, Education/ Instruction, Manual therapy, Therapeutic activities, Therapeutic exercises, Taping techniques  PT Plan: Skilled PT  PT Frequency: 2 times per week  Duration: 4-6 wks  Number of Treatments Authorized: Auth required     Subjective: No new complaints . Faired well with trip stopping as needed during drive. Using lumbar towel roll support effective . Foot is bad 5-6/10  "eval for plantar fascitis  tomorrow . 1 'attack' sciatica  R side sharp pain all the way to calf , cramped took about 5 minutes to 'walk it off' experienced when getting out of bed (Friday?). Experienced RLE leg cramp with 4 reps of bridge , 2 additional pt felt increase in pain low back , discontinued bridge at this point   Response to last session: good    Performing HEP:  a little    Pain   3/10 back   Objective:  Treatment:  Therapeutic Exercise 43 minutes  Posture against wall  Standing    Lumbar extension back bends with SB at the wall 10 reps 5-10\"  Seated HS stretching   Introduced prone positioning   Prone prop up with deep breaths 2 minutes  Prone GS 10 reps 5\" holds  Fully prone 2 minutes  Pron prop up initiating press up through chest 5\" holds 10 reps  Side lying Clamshell green t-band resistance 10 reps L/R  TA bracing isoball squeeze at knees hip Adduction 10 reps 5\"  Beginner Bridge 6 reps 3\" holds  DKTC 10\" x 8 reps with SB   Education:  Outpatient Education  Education Comment: [id number]    Christa Ashby, PTA    Active       PT Problem       Pain (Progressing)       Start:  04/18/25    Expected End:  06/13/25       Patient will report reduction of pain by  80%  to ease performance of bending and lifting, and driving greater than 30 mins.         ROM (Progressing)       Start:  04/18/25    Expected End:  06/13/25       Patient will demonstrate 25% improvement in lumbar AROM all planes and without pain,  to ease the performance of ADLS, bending and lifting to care for puppies and dogs.          Strength (Progressing)       Start:  04/18/25    Expected End:  06/13/25       Patient will increase trunk and LE strength by 1/3 MMT grade to facilitate endurance for good postural alignment, and to provide spinal protection during activity and static positions.         HEP (Progressing)       Start:  04/18/25    Expected End:  06/13/25       Patient will be independent and compliant with safe and " recommended  HEP to enhance functional progress and long term management of condition.           Outcome (Not Progressing)       Start:  04/18/25    Expected End:  06/13/25       Patient will improve outcome measures score on  SIMON by 15%  to show a clinically significant improvement  in functional mobility.

## 2025-06-05 ENCOUNTER — EVALUATION (OUTPATIENT)
Dept: PHYSICAL THERAPY | Facility: CLINIC | Age: 75
End: 2025-06-05
Payer: MEDICARE

## 2025-06-05 DIAGNOSIS — M79.672 LEFT FOOT PAIN: ICD-10-CM

## 2025-06-05 DIAGNOSIS — M72.2 PLANTAR FASCIITIS: ICD-10-CM

## 2025-06-05 DIAGNOSIS — R26.9 GAIT ABNORMALITY: Primary | ICD-10-CM

## 2025-06-05 DIAGNOSIS — M72.2 PLANTAR FASCIITIS OF LEFT FOOT: ICD-10-CM

## 2025-06-05 PROCEDURE — 97140 MANUAL THERAPY 1/> REGIONS: CPT | Mod: GP | Performed by: PHYSICAL THERAPIST

## 2025-06-05 PROCEDURE — 97161 PT EVAL LOW COMPLEX 20 MIN: CPT | Mod: GP | Performed by: PHYSICAL THERAPIST

## 2025-06-05 RX ORDER — METOPROLOL SUCCINATE 25 MG/1
25 TABLET, EXTENDED RELEASE ORAL 2 TIMES DAILY
Qty: 180 TABLET | Refills: 3 | Status: SHIPPED | OUTPATIENT
Start: 2025-06-05

## 2025-06-05 ASSESSMENT — ENCOUNTER SYMPTOMS
LOSS OF SENSATION IN FEET: 1
OCCASIONAL FEELINGS OF UNSTEADINESS: 1
DEPRESSION: 0

## 2025-06-05 ASSESSMENT — ACTIVITIES OF DAILY LIVING (ADL): ADL_ASSISTANCE: INDEPENDENT

## 2025-06-05 NOTE — PROGRESS NOTES
Physical Therapy  Physical Therapy Evaluation  Patient Name: Rachael Shah  MRN: [id number]  Today's Date: 6/5/2025    Time Calculation  Start Time: 0718  Stop Time: 0810  Time Calculation (min): 52 min  General  Reason for Referral: Plantar fascitis  Referred By: Dr. CLAUDINE Peoples  Past Medical History Relevant to Rehab: Past medical history significant for right TKR; mild neuropathy (numbness); Hypertension, Hypercholesteremia, Paroxysmal atrial fibrillation and atrial tachycardia she had been chemically converted however converted back to A-fib has had 3 separate ablations she remains on anticoagulation with Xarelto and metoprolol for rate control; Severe DJD; right knee replacement; left shoulder replacement; Low back pain; Diabetes; Hearing loss now hearing aids  General Comment: Onset Date 6/5/25; Visit #1/10; 2025: 2025: AUTH REQ. 30.00 [employer] PAY, 100% [place]AGE, [place], [place] (also has chart open currently for her back)     1. Gait abnormality        2. Plantar fasciitis  Referral to Physical Therapy    Follow Up In Physical Therapy      3. Plantar fasciitis of left foot  Follow Up In Physical Therapy      4. Left foot pain  Follow Up In Physical Therapy          Precautions  STEADI Fall Risk Score (The score of 4 or more indicates an increased risk of falling): 3  Precautions Comment: h/o A-fib and A-tachycardia, R TKA, L TSA, DM; STEADI fall risk at 3, mild fall risk.      Assessment:  Patient is a 74 year  female old who presents to Physical therapy secondary to left plantar fascitis pain causing gait abnormality.  She is currently undergoing physical therapy for her back. Upon PT evaluation the patient is presenting with the following deficits: decreased ankle and hip strength, abnormal posture, decreased ankle range of motion and flexibility, tight and restricted plantar fascia and gastroc/soleus complex, decreased balance and stability.   The above deficits are limiting patient's ability to  functionally stand and walk for periods of time, decreased overall balance, decreased ability to perform daily ADLs due to pain.  Secondary to the above deficits the patient will benefit from skilled PT intervention to allow the patient to progress to the goal of decreasing pain and improving range of motion and strength to improve overall functional mobility.  PT will monitor progress towards goals and adjust intervention as appropriate.      PT Assessment  PT Assessment Results: Decreased strength, Decreased range of motion, Decreased endurance, Impaired balance, Decreased mobility, Pain, Impaired sensation  Rehab Prognosis: Good  Barriers to Discharge: none  Evaluation/Treatment Tolerance: Patient limited by pain    Medical History Form: Reviewed and scanned into chart     Plan:  OP PT Plan  Treatment/Interventions: Cryotherapy, Dry needling, Education/ Instruction, Manual therapy, Neuromuscular re-education, Self care/ home management, Taping techniques, Therapeutic activities, Therapeutic exercises  PT Plan: Skilled PT  PT Frequency: 2 times per week  Duration: 5 weeks  Onset Date: 06/05/25  Number of Treatments Authorized: Auth required  Rehab Potential: Good  Plan of Care Agreement: Patient  Plan of care discussed with patient and patient agrees with plan    Subjective:      Client notes having left sided heel pain for 3-4 weeks.  She notes she has had it on the right heel in the past with arch collapsed and has orthotic for the right foot. But not for the left. She only wears it for prolonged walking right now. Currently having constant pain with walking. Walking on the ball of the foot. Bought over the counter arch support. Does not have podiatrist. Also has the night splint for stretching. She did see her PCP and gave her a medication for cramping and advised physical therapy.     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     Pain     Pain aggravating Factors: increased with weight bearing, standing  and walking, negotiating steps, painful with 1st steps in the morning and 1st steps after sitting.   Pain relieving factors: massaging; Tylenol as needed; orthotic shoes with arch support and over the counter orthotics  Pain location: right in the left heel   Pain description: sharp and shooting  Red Flags: Do you have any of the following? none  Fever/chills, unexplained weight changes, dizziness/fainting, unexplained change in bowel or bladder functions, unexplained malaise or muscle weakness, night pain/sweats, numbness or tingling, history of CA.  Previous Interventions/Treatments:  none   Prior Testing: none    Prior Function Per Pt/Caregiver Report  Level of [place]: Independent with ADLs and functional transfers  ADL Assistance: Independent   Home Living  Home Living Comment: One story story home with 11 steps to get in home.  Primary Language: English  Patient's goal for therapy: To stop the pain   Social Determinants of Health: none       Objective:   Balance:   Feet together balance eyes open GOOD  Feet together balance eyes closed FAIR moderate sway  Tandem stance moderate sway     HIP  Specific Lower Extremity MMT  R Iliopsoas: (5/5): 4/5  L Iliopsoas: (5/5): 4/5  R Gluteals (sidelying): (5/5): 4/5  L Gluteals (sidelying): (5/5): 4/5  R knee flexion: (5/5): 4/5  L knee flexion: (5/5): 4/5  R knee extension: (5/5): 4/5  L knee extension: (5/5): 4/5     ANKLE    Functional Rating Scale  LEFS   : 40/80    Observation  Observation Comment: pes planus feet with fallen arch and hip in external rotation on the left, decreased weight bearing in standing on the left leg with heel slightly off the ground.    Ankle Palpation/Joint Mobility Assessment  Palpation/Joint Mobility Comment: Positive tightness and tenderness left plantar fascia, heel, achilles, gastroc/soleus complex.    Ankle AROM  R ankle dorsiflexion: (10°): 5  L ankle dorsiflexion: (10°): 0  R ankle plantarflexion: (40°): 30  L ankle  plantarflexion: (40°): 35  R ankle inversion: (30°): 25  L ankle inversion: (30°): 30  R ankle eversion: (20°): 20  L ankle eversion: (20°): 10    Ankle MMT  R ankle dorsiflexion: (5/5): 4/5  L ankle dorsiflexion: (5/5): 3+/5  R ankle plantarflexion: (5/5): 4/5  L ankle plantarflexion: (5/5): 3+/5  R ankle inversion: (5/5): 4/5  L ankle inversion: (5/5): 3+/5  R ankle eversion: (5/5): 4/5  L ankle eversion: (5/5): 3+/5      Joint Mobility Testing  Joint Mobility Comment: Moderate stiffness with subtalar mobilization and inversion and eversion mobilizations.    Gait  Gait Comment: Client ambulated with decreased amairani, step length and height, left hip external rotation, decreased dorsiflexion during swing phase of gait, no heel toe gait pattern, and antalgic gait.    Flexibility  Flexibility Comment: Tight gastroc/soleus complex left    Outcome Measures:  Timed Up and Go Test  How many seconds did it take to complete the 5 tasks?: 22 seconds    Other Measures  30 Second Sit to Stand: 5  Lower Extremity Funtional Score (LEFS): 40/80      Treatment:   Low Complexity Eval: 35 minutes    Therapeutic Exercise (31622):   5 MINUTES  Therapeutic exercises completed this date to improve strength and postural control to improve ability to perform functional activities safely.        LE stretching: Handout provided for HEP  -Longsit gastroc stretch 3X hold 30 seconds left side  -Seated plantar fascia stretch 3X hold 30 seconds  -Standing gastroc stretch foot on wall with plantar fascia component 3X hold 30 seconds  -Standing gastroc stretch off step 3X hold 30 seconds    Manual Therapy (71069):  Manual therapy completed this date to improve tissue and joint mobility to allow for improved body/joint mechanics/ROM and to decrease pain.   10minutes      -Supine with head elevated left plantar fascia release and strumming, gentle strumming at plantar fascia insertion into the calcaneous concurrent with gentle dorsiflexion  stretching to increase soft tissue extensibility and reduce pain.     Education:  Outpatient Education  Individual(s) Educated: Patient  Education Provided: Anatomy, Body Mechanics, POC, Home Exercise Program  Diagnosis and Precautions: Left plantar fascitis -  Risk and Benefits Discussed with Patient/Caregiver/Other: yes  Patient/Caregiver Demonstrated Understanding: yes  Plan of Care Discussed and Agreed Upon: yes  Patient Response to Education: Patient/Caregiver Verbalized Understanding of Information    Goals:  Goals were developed with patient input   Active       Mobility       LTG - Patient will demonstrate independence and compliance with safe and appropriate HEP for pain reduction, ROM/flexibility,  LE strength and postural correction.        Start:  06/05/25    Expected End:  09/03/25            LTG - Patient with increase LE strength hip and ankle by 1 MMT grade for improved functional strength with walking.        Start:  06/05/25    Expected End:  09/03/25            LTG - Patient will increase B dorsiflexion range of motion to 10 degrees for improved functional stride length with walking.       Start:  06/05/25    Expected End:  09/03/25            LTG - Patient will increase 30 second sit to stand test to 10X for improved functional strength with transfers.        Start:  06/05/25    Expected End:  09/03/25            LTG - Patient will increase TUG time to 15 seconds for improved functional gait mobility.        Start:  06/05/25    Expected End:  09/03/25            LTG - Patient will demonstrate only mild sway with tandem stance x 20 seconds showing improved overall balance and decreased risk of falls.        Start:  06/05/25    Expected End:  09/03/25            STG - Patient will increase TUG time to 18 seconds for improved functional strength with transfers.        Start:  06/05/25    Expected End:  06/26/25            STG - Patient increase LE strength by 1/2 MMT grade for improved functional  strength with walking.        Start:  06/05/25    Expected End:  06/26/25            Patient Goal 1 - Client will be able to walk with full weight bearing and normal mechanics and stride length with only 2-3/10 pain level for return to functional daily mobility.        Start:  06/05/25    Expected End:  09/03/25              Heike Donnelly, PT

## 2025-06-06 ENCOUNTER — TREATMENT (OUTPATIENT)
Dept: PHYSICAL THERAPY | Facility: CLINIC | Age: 75
End: 2025-06-06
Payer: MEDICARE

## 2025-06-06 DIAGNOSIS — M54.42 CHRONIC MIDLINE LOW BACK PAIN WITH LEFT-SIDED SCIATICA: Primary | ICD-10-CM

## 2025-06-06 DIAGNOSIS — M62.81 WEAKNESS OF TRUNK MUSCULATURE: ICD-10-CM

## 2025-06-06 DIAGNOSIS — G89.29 CHRONIC MIDLINE LOW BACK PAIN WITH LEFT-SIDED SCIATICA: Primary | ICD-10-CM

## 2025-06-06 DIAGNOSIS — R29.3 ABNORMAL POSTURE: ICD-10-CM

## 2025-06-06 DIAGNOSIS — M53.80 DECREASED RANGE OF MOTION OF SPINE: ICD-10-CM

## 2025-06-06 PROCEDURE — 97110 THERAPEUTIC EXERCISES: CPT | Mod: GP

## 2025-06-06 NOTE — PROGRESS NOTES
Physical Therapy                                                                                  Physical Therapy Treatment       Patient name: Rachael Shah  MRN:   [id number]  Today's Date: 6/6/2025  9      Time Calculation  Start Time: 0803  Stop Time: 0847  Time Calculation (min): 44 min    Assessment:  Patient completed session today with mod effort.   Tightness/decreased ROM noted with trunk and hip extension, and quad/hip flexor flexibility. However, patient demonstrated centralization of symptoms with extension based exercises today.   The patient will benefit from skilled PT services to improve functional abilities, decrease pain and return to PLOF and improve QoL.       Plan:    Continue with Emma extension and hip strengthening, and stretching as able.       Monitor response to treatment and adjust plan as needed.   To continue with current POC.            Current Problems:       1. Chronic midline low back pain with left-sided sciatica  Follow Up In Physical Therapy       2. Abnormal posture          3. Decreased range of motion of spine          4. Weakness of trunk musculature            General  Reason for Referral: chronic LBP with left sided sciatica  Referred By: Dr. CLAUDINE Peoples  Past Medical History Relevant to Rehab: Past medical history significant for right TKR; mild neuropathy (numbness); Hypertension, Hypercholesteremia, Paroxysmal atrial fibrillation and atrial tachycardia she had been chemically converted however converted back to A-fib has had 3 separate ablations she remains on anticoagulation with Xarelto and metoprolol for rate control; Severe DJD; right knee replacement; left shoulder replacement; Low back pain; Diabetes; Hearing loss now hearing aids  General Comment: Visit 9 Insurance: 04/21/2025: 6V PT APPROVED 04/18/2025-06/16/2025 APPROVED CODES: [id number], [id number], [id number]    Umm MENDOZA Fall Risk Score (The score of 4 or more indicates an increased risk of falling):  "3  Precautions Comment: h/o A-fib and A-tachycardia, R TKA, L TSA, DM; STEADI fall risk at 3, mild fall risk.    Subjective:  Patient reported 3-3.5/10 pain in right buttocks and hamstring area.  LB tightness.  Using the towel roll behind LB while driving for her roadtrip.  Reports its a little easier getting out of bed.      Response to last session:  no problems   Performing HEP: yes     Pain  3-3.5/10 right buttocks and hamstring at the start of   session.  3/10 pain in central and left LB.  0/10 pain right buttocks and hamstrings at the end of session.         Treatment:   Therapeutic exercise   Sci fit level 1.3  X 10  minutes seat 11    Standing  Lumbar extension back bends with SB at the wall 3\" hold 2 x 10  R/L gastroc  runner's stretch  3 x 20\"   R/L hamstring stretch at steps 3 x 20\"  R/L lateral step dips with opposite hip ABD, 6\" step 2 x 10   Alt R/L step taps, 6\" with BUE support 2 x 10    Prone  Prone lying 2 x 1 min  Prone on elbows 2 x 1 min  B glut sets 5\" hold 2 x 10  R/L knee flexion 2\" hold 1 x 10     Seated on the edge of mat  B hip IR with knees bent 3\" hold 2 x 10 reps within limited ROM due to pain.    Education:  Continue with current HEP as previously instructed.   Access Code: [email or web address]  URL: [email or web address]     Plan to update HEP within 1-2 visits to include standing trunk and hip strengthening.       Goals    Physical Therapy - Physical Therapy - April 2025 Problems       Physical Therapy - Physical Therapy - April 2025 Problems (Active)       PT Problem       Pain (Progressing)       Start:  04/18/25    Expected End:  06/13/25       Patient will report reduction of pain by  80%  to ease performance of bending and lifting, and driving greater than 30 mins.         ROM (Progressing)       Start:  04/18/25    Expected End:  06/13/25       Patient will demonstrate 25% improvement in lumbar AROM all planes and without pain,  to ease the performance of ADLS, " bending and lifting to care for puppies and dogs.          Strength (Progressing)       Start:  04/18/25    Expected End:  06/13/25       Patient will increase trunk and LE strength by 1/3 MMT grade to facilitate endurance for good postural alignment, and to provide spinal protection during activity and static positions.         HEP (Progressing)       Start:  04/18/25    Expected End:  06/13/25       Patient will be independent and compliant with safe and recommended  HEP to enhance functional progress and long term management of condition.           Outcome (Not Progressing)       Start:  04/18/25    Expected End:  06/13/25       Patient will improve outcome measures score on  SIMON by 15%  to show a clinically significant improvement  in functional mobility.               Physical Therapy - Plantar fascitis Problems       Physical Therapy - Plantar fascitis Problems (Active)       Mobility       LTG - Patient will demonstrate independence and compliance with safe and appropriate HEP for pain reduction, ROM/flexibility,  LE strength and postural correction.        Start:  06/05/25    Expected End:  09/03/25            LTG - Patient with increase LE strength hip and ankle by 1 MMT grade for improved functional strength with walking.        Start:  06/05/25    Expected End:  09/03/25            LTG - Patient will increase B dorsiflexion range of motion to 10 degrees for improved functional stride length with walking.       Start:  06/05/25    Expected End:  09/03/25            LTG - Patient will increase 30 second sit to stand test to 10X for improved functional strength with transfers.        Start:  06/05/25    Expected End:  09/03/25            LTG - Patient will increase TUG time to 15 seconds for improved functional gait mobility.        Start:  06/05/25    Expected End:  09/03/25            LTG - Patient will demonstrate only mild sway with tandem stance x 20 seconds showing improved overall balance and  decreased risk of falls.        Start:  06/05/25    Expected End:  09/03/25            STG - Patient will increase TUG time to 18 seconds for improved functional strength with transfers.        Start:  06/05/25    Expected End:  06/26/25            STG - Patient increase LE strength by 1/2 MMT grade for improved functional strength with walking.        Start:  06/05/25    Expected End:  06/26/25            Patient Goal 1 - Client will be able to walk with full weight bearing and normal mechanics and stride length with only 2-3/10 pain level for return to functional daily mobility.        Start:  06/05/25    Expected End:  09/03/25

## 2025-06-10 ENCOUNTER — TREATMENT (OUTPATIENT)
Dept: PHYSICAL THERAPY | Facility: CLINIC | Age: 75
End: 2025-06-10
Payer: MEDICARE

## 2025-06-10 DIAGNOSIS — M72.2 PLANTAR FASCIITIS OF LEFT FOOT: ICD-10-CM

## 2025-06-10 DIAGNOSIS — M53.80 DECREASED RANGE OF MOTION OF SPINE: ICD-10-CM

## 2025-06-10 DIAGNOSIS — M54.42 CHRONIC MIDLINE LOW BACK PAIN WITH LEFT-SIDED SCIATICA: ICD-10-CM

## 2025-06-10 DIAGNOSIS — R26.9 GAIT ABNORMALITY: Primary | ICD-10-CM

## 2025-06-10 DIAGNOSIS — M72.2 PLANTAR FASCIITIS: ICD-10-CM

## 2025-06-10 DIAGNOSIS — R29.3 ABNORMAL POSTURE: ICD-10-CM

## 2025-06-10 DIAGNOSIS — G89.29 CHRONIC MIDLINE LOW BACK PAIN WITH LEFT-SIDED SCIATICA: ICD-10-CM

## 2025-06-10 DIAGNOSIS — M62.81 WEAKNESS OF TRUNK MUSCULATURE: ICD-10-CM

## 2025-06-10 DIAGNOSIS — M79.672 LEFT FOOT PAIN: ICD-10-CM

## 2025-06-10 PROCEDURE — 97110 THERAPEUTIC EXERCISES: CPT | Mod: GP

## 2025-06-10 PROCEDURE — 97140 MANUAL THERAPY 1/> REGIONS: CPT | Mod: GP

## 2025-06-10 NOTE — PROGRESS NOTES
Physical Therapy                                                                                  Physical Therapy Treatment       Patient name: Rachael Shah  MRN:   [id number]  Today's Date: 6/10/2025  Visit 2 of 5 for plantar fasciitis     Time Calculation  Start Time: 0800  Stop Time: 0847  Time Calculation (min): 47 min    Assessment:  Patient completed session today with mod effort.  No reports of increased pain during exercises today.  May benefit from consult for orthotics or proper supportive footwear as this directly influences outcome, as well as right post thigh pain and LBP.   Patient will continue to benefit from PT to improve left ankle ROM, flexibility and strength to improve biomechanics and decrease pain.      Plan:        Monitor response to treatment and adjust plan as needed.   To continue with current POC with emphasis on decreasing pain and restoring full function L foot and ankle.         Current Problems:       1. Gait abnormality        2. Abnormal posture        3. Decreased range of motion of spine        4. Weakness of trunk musculature        5. Plantar fasciitis of left foot  Follow Up In Physical Therapy      6. Left foot pain  Follow Up In Physical Therapy      7. Plantar fasciitis  Follow Up In Physical Therapy                General  Reason for Referral: plantar fasciitis  Referred By: Dr. CLAUDINE Peoples  Past Medical History Relevant to Rehab: Past medical history significant for right TKR; mild neuropathy (numbness); Hypertension, Hypercholesteremia, Paroxysmal atrial fibrillation and atrial tachycardia she had been chemically converted however converted back to A-fib has had 3 separate ablations she remains on anticoagulation with Xarelto and metoprolol for rate control; Severe DJD; right knee replacement; left shoulder replacement; Low back pain; Diabetes; Hearing loss now hearing aids  General Comment: Visit 2 of 5;  Insurance: 06/09/2025: 5V PT APPROVED  "06/05/2025-08/03/2025   APPROVED CODES: [id number], [id number], [id number], [id number], [id number]    Precautions  STEADI Fall Risk Score (The score of 4 or more indicates an increased risk of falling): 3  Precautions Comment: h/o A-fib and A-tachycardia, R TKA, L TSA, DM; STEADI fall risk at 3, mild fall risk.    Subjective:  Patient reported she would like to work on left plantar fasciitis today and work on LB on Friday.   LBP rated 3/10 across back, but hamstrings 4/10.  Pain on the bottom of Left heel rated 4/10.   Response to last session:  no problems   Performing HEP: yes    Pain  LBP 3/10,  right hamstrings 4/10 and left heel 4/10 at the start of session.        Treatment:   Therapeutic exercises  - to improve strength and postural control to improve ability to perform functional activities safely.     Recumbent scifit seat 11, level 1.0 x 10 mins    Seated  on edge of mat  -BAPS board left ankle, levels 2 - 1, DF/PF, Inv/Ever, CW/CCW x 25 reps  -left toe curls off edge of 2\" block, 3\" hold x 20 reps  -left toe extension splaying 3\" hold x 20 reps  -left heel raises 3\" hold x 20 reps   -left ankle DF 3\" hold x 20 reps   -left ankle inversion/arch lift with yellow theraband 3\" hold 2 x 10 reps     Half long sitting  L gastroc/hamstring stretch with strap 4 x 20\"        Manual Therapy  -to improve tissue and joint mobility to allow for improved body/joint mechanics/ROM and to decrease pain.     Supine with head elevated and large bolster under knees:  -STM to left plantar fascia and insertion into calcaneous, achilles tendon  concurrent with dorsiflexion stretching to increase soft tissue extensibility and reduce pain.  Passive calcaneal eversion.     Education:  Continue with HEP as instructed at [facility].  Suggested purchasing heel bone spur pad.  Discussed importance of good footwear and possible orthotics (ie [employer], [employer]) especially since she is on her feet a lot.        Goals    Physical Therapy - Physical " Therapy - April 2025 Problems       Physical Therapy - Physical Therapy - April 2025 Problems (Active)       PT Problem       Pain (Progressing)       Start:  04/18/25    Expected End:  06/13/25       Patient will report reduction of pain by  80%  to ease performance of bending and lifting, and driving greater than 30 mins.         ROM (Progressing)       Start:  04/18/25    Expected End:  06/13/25       Patient will demonstrate 25% improvement in lumbar AROM all planes and without pain,  to ease the performance of ADLS, bending and lifting to care for puppies and dogs.          Strength (Progressing)       Start:  04/18/25    Expected End:  06/13/25       Patient will increase trunk and LE strength by 1/3 MMT grade to facilitate endurance for good postural alignment, and to provide spinal protection during activity and static positions.         HEP (Progressing)       Start:  04/18/25    Expected End:  06/13/25       Patient will be independent and compliant with safe and recommended  HEP to enhance functional progress and long term management of condition.           Outcome (Not Progressing)       Start:  04/18/25    Expected End:  06/13/25       Patient will improve outcome measures score on  SIMON by 15%  to show a clinically significant improvement  in functional mobility.               Physical Therapy - Plantar fascitis Problems       Physical Therapy - Plantar fascitis Problems (Active)       Mobility       LTG - Patient will demonstrate independence and compliance with safe and appropriate HEP for pain reduction, ROM/flexibility,  LE strength and postural correction.        Start:  06/05/25    Expected End:  09/03/25            LTG - Patient with increase LE strength hip and ankle by 1 MMT grade for improved functional strength with walking.        Start:  06/05/25    Expected End:  09/03/25            LTG - Patient will increase B dorsiflexion range of motion to 10 degrees for improved functional stride  length with walking.       Start:  06/05/25    Expected End:  09/03/25            LTG - Patient will increase 30 second sit to stand test to 10X for improved functional strength with transfers.        Start:  06/05/25    Expected End:  09/03/25            LTG - Patient will increase TUG time to 15 seconds for improved functional gait mobility.        Start:  06/05/25    Expected End:  09/03/25            LTG - Patient will demonstrate only mild sway with tandem stance x 20 seconds showing improved overall balance and decreased risk of falls.        Start:  06/05/25    Expected End:  09/03/25            STG - Patient will increase TUG time to 18 seconds for improved functional strength with transfers.        Start:  06/05/25    Expected End:  06/26/25            STG - Patient increase LE strength by 1/2 MMT grade for improved functional strength with walking.        Start:  06/05/25    Expected End:  06/26/25            Patient Goal 1 - Client will be able to walk with full weight bearing and normal mechanics and stride length with only 2-3/10 pain level for return to functional daily mobility.        Start:  06/05/25    Expected End:  09/03/25

## 2025-06-13 ENCOUNTER — TREATMENT (OUTPATIENT)
Dept: PHYSICAL THERAPY | Facility: CLINIC | Age: 75
End: 2025-06-13
Payer: MEDICARE

## 2025-06-13 DIAGNOSIS — M54.42 CHRONIC MIDLINE LOW BACK PAIN WITH LEFT-SIDED SCIATICA: ICD-10-CM

## 2025-06-13 DIAGNOSIS — R26.9 GAIT ABNORMALITY: Primary | ICD-10-CM

## 2025-06-13 DIAGNOSIS — M72.2 PLANTAR FASCIITIS OF LEFT FOOT: ICD-10-CM

## 2025-06-13 DIAGNOSIS — G89.29 CHRONIC MIDLINE LOW BACK PAIN WITH LEFT-SIDED SCIATICA: ICD-10-CM

## 2025-06-13 DIAGNOSIS — M79.672 LEFT FOOT PAIN: ICD-10-CM

## 2025-06-13 PROCEDURE — 97530 THERAPEUTIC ACTIVITIES: CPT | Mod: GP

## 2025-06-13 NOTE — PROGRESS NOTES
Physical Therapy                                                                                  Physical Therapy Treatment /PT Re-check  Lumbar       Patient name: Rachael Shah  MRN:   [id number]  Today's Date: 6/13/2025  11      Time Calculation  Start Time: 0800  Stop Time: 0838  Time Calculation (min): 38 min    Assessment:  Patient has been seen for 10 visits of PT to address chronic LBP and left sided sciatica, as well as right buttock pain.  Overall patient reports 10% improvement in pain frequency, duration and intensity.   Left LE pain is now  even less intermittent. She is still having difficulty bending, lifting and driving (greater than 30 mins).  Getting out of the car and moving around, bending backwards helps to loosen up spine.  She demonstrates improved postural alignment and awareness, however continues to  demonstrate significant joint restrictions thoracolumbar spine all planes of motion, flattened TL spinal curves.  Weakness persists in core and B hip muscles which is contributing to pain and limited functional mobility.   Impairments result in difficulty with prolonged sitting and driving, bending and lifting, poor sleep quality, difficulty caring for puppies.  Patient is a breeder and  for dog shows.   Patient will benefit from skilled PT intervention to return to all ADLs, functional mobility and recreational activities symptom free including increased ease with caring for puppies.       In addition, patient is being seen at this facility for left plantar fasciitis.        Plan:        Monitor response to treatment and adjust plan as needed.   To continue with current POC with emphasis on spinal mobility exercises, core and DLS strengthening, LE stretches.         Current Problems:       1. Gait abnormality        2. Plantar fasciitis of left foot        3. Left foot pain        4. Chronic midline low back pain with left-sided sciatica  Follow Up In Physical Therapy                 General  Reason for Referral: chronic LBP with left sided sciatica  Referred By: Dr. CLAUDINE Peoples  Past Medical History Relevant to Rehab: Past medical history significant for right TKR; mild neuropathy (numbness); Hypertension, Hypercholesteremia, Paroxysmal atrial fibrillation and atrial tachycardia she had been chemically converted however converted back to A-fib has had 3 separate ablations she remains on anticoagulation with Xarelto and metoprolol for rate control; Severe DJD; right knee replacement; left shoulder replacement; Low back pain; Diabetes; Hearing loss now hearing aids  General Comment: Visit 10  Insurance:    APPROVED CODES: [id number], [id number], [id number], [id number], [id number]    Precautions  STEADI Fall Risk Score (The score of 4 or more indicates an increased risk of falling): 3  Precautions Comment: h/o A-fib and A-tachycardia, R TKA, L TSA, DM; STEADI fall risk at 3, mild fall risk.    Subjective:  Patient reported she went the [employer] and purchased arch supports.  States its a 3 step process and she is on day 3 of wearing new arch supports.  Reports no problems and she is following guidelines for wearing as instructed.  Patient reports she still can't drive greater than 30 mins without increase in R LB and buttock pain.  Left side is OK at present.  Left LE pain intermittent.  Patient reports stretching exercises are easier and she is able to go on her stomach now. Reports noticeable 10% improvement in pain frequency, duration and intensity.  Having more back pain vs leg pain (indicating centralization of symptoms).  Improving ability to move and complete daily tasks.   Response to last session:   no problems  Performing HEP: yes    Pain  3/10 right buttocks at the start of session  No worse at the end of session.     Objective:  Posture  Min  FH, improved postural awareness and upright posture, but still with  decreased  thoracic kyphosis, decreased lumbar lordosis (flattened spinal curves)      Gait  Ambulates without assistive device with the following gait deviations:  BLE's Externally rotated, increased right lateral trunk shift, decreased left terminal knee extension at IC, decreased hip extension L>R.      Lumbar AROM  Flexion marked restriction, min pain at end range, decreased reversal of TL curve  Extension marked restriction, min pain during motion (about 5-10 deg past neutral)   Rotation R 50%, min pain during motion  Rotation L 25% mod pain during motion  Side Bend R 50%, min pain during motion  Side Bend L 25 % mod pain  at end range     Repeated movements  Repeated flexion in standing x 10 - increased pain in LB and R buttock  Repeated extension in standing x 10 -  stiffness during motion, no change   Repeated flexion in lying x 10 -  increased pain in center of LB.  Repeated extension in lying - decreased motion, no change     Strength  Hip flexion sitting B 4/5   Hip flexion supine B 3/5, decreased DLS  Bridges - only slight lift and severe pain   Hip abduction sidelying L 3-/5, R 3/5 - both painful  Knee extension B 4+/5  Knee flexion B 4/5  Ankle DF B 5/5  Ankle PF B 4/5  Abdominals poor  Core/DLS poor  Trunk extensors  poor     ROM  Hip  Left hip WFL and painfree  Right hip WFL except IR to 10 deg, pain with ER and IR     Knee  Left  deg  Right 3 - 119 deg      Ankle  B WFL and painfree        Flexibility  Hamstrings L deg, R 53 deg  Piriformis L good , R fair   Hip flexors B poor  Gastrocs B fair     Neuro  Slump Test - B negative  Sensation -B LE intact to light touch   SLR - B negative      Outcome Measures:  Other Measures  Oswestry Disablity Index (SIMON): score 27, 54 %     Education:  Access Code: [email or web address]  URL: [email or web address]  Date: 06/13/2025  Prepared by: Cari Aquino    Program Notes  STOP ANY EXERCISE THAT CAUSES A LASTING INCREASE IN PAIN. DO ALL EXERCISES WITHIN PAINFREE RANGE OF MOTION AND TOLERANCE.     Exercises  - Supine Hip Adduction  "Isometric with Ball  - 1 x daily - 7 x weekly - 2 sets - 10 reps - 5\" hold  - Supine Transversus Abdominis Bracing - Hands on Stomach  - 1 x daily - 7 x weekly - 2 sets - 10 reps - 5\" hold  - Hooklying Clamshell with Resistance  - 1 x daily - 7 x weekly - 2 sets - 10 reps - 5\" hold  - Beginner Knee Fold  - 1 x daily - 7 x weekly - 2 sets - 10 reps - 5\" hold  - Seated Piriformis Stretch  - 1 x daily - 7 x weekly - 1 sets - 4-6 reps - 20\"-30\" hold  - Seated Calf Stretch with Strap  - 1 x daily - 7 x weekly - 1 sets - 4-6 reps - 20-30\" hold  - Prone on Elbows Stretch  - 1 x daily - 7 x weekly - 2 sets - 10 reps - 5\" hold  - Prone Press Up  - 1 x daily - 7 x weekly - 2 sets - 10 reps - 5\" hold  - Seated Anterior Pelvic Tilt  - 1 x daily - 7 x weekly - 10 reps  - Clam with Resistance  - 1 x daily - 7 x weekly - 2 sets - 10 reps - 5\" hold      Goals    Physical Therapy - Physical Therapy - April 2025 Problems       Physical Therapy - Physical Therapy - April 2025 Problems (Active)       PT Problem       Pain (Progressing)       Start:  04/18/25    Expected End:  06/13/25       Patient will report reduction of pain by  80%  to ease performance of bending and lifting, and driving greater than 30 mins.         ROM (Progressing)       Start:  04/18/25    Expected End:  06/13/25       Patient will demonstrate 25% improvement in lumbar AROM all planes and without pain,  to ease the performance of ADLS, bending and lifting to care for puppies and dogs.          Strength (Progressing)       Start:  04/18/25    Expected End:  06/13/25       Patient will increase trunk and LE strength by 1/3 MMT grade to facilitate endurance for good postural alignment, and to provide spinal protection during activity and static positions.         HEP (Met)       Start:  04/18/25    Expected End:  06/13/25    Resolved:  06/13/25    Patient will be independent and compliant with safe and recommended  HEP to enhance functional progress and long term " management of condition.           Outcome (Progressing)       Start:  04/18/25    Expected End:  06/13/25       Patient will improve outcome measures score on  SIMON by 15%  to show a clinically significant improvement  in functional mobility.       Goal Note       Patient subjectively reporting improved tolerance for functional mobility, however Outcome score does not reflect changes.                    Physical Therapy - Plantar fascitis Problems       Physical Therapy - Plantar fascitis Problems (Active)       Mobility       LTG - Patient will demonstrate independence and compliance with safe and appropriate HEP for pain reduction, ROM/flexibility,  LE strength and postural correction.        Start:  06/05/25    Expected End:  09/03/25            LTG - Patient with increase LE strength hip and ankle by 1 MMT grade for improved functional strength with walking.        Start:  06/05/25    Expected End:  09/03/25            LTG - Patient will increase B dorsiflexion range of motion to 10 degrees for improved functional stride length with walking.       Start:  06/05/25    Expected End:  09/03/25            LTG - Patient will increase 30 second sit to stand test to 10X for improved functional strength with transfers.        Start:  06/05/25    Expected End:  09/03/25            LTG - Patient will increase TUG time to 15 seconds for improved functional gait mobility.        Start:  06/05/25    Expected End:  09/03/25            LTG - Patient will demonstrate only mild sway with tandem stance x 20 seconds showing improved overall balance and decreased risk of falls.        Start:  06/05/25    Expected End:  09/03/25            STG - Patient will increase TUG time to 18 seconds for improved functional strength with transfers.        Start:  06/05/25    Expected End:  06/26/25            STG - Patient increase LE strength by 1/2 MMT grade for improved functional strength with walking.        Start:  06/05/25    Expected  End:  06/26/25            Patient Goal 1 - Client will be able to walk with full weight bearing and normal mechanics and stride length with only 2-3/10 pain level for return to functional daily mobility.        Start:  06/05/25    Expected End:  09/03/25                 Date of Evaluation: 4/18/25  Onset Date: 3/17/25  Referring Physician: Dr. Tatum Aquino, PT  CPT Codes: Therapeutic Exercise: 72560, Therapeutic Activity: 81003, and Manual Therapy: 04125  Diagnosis:   Problem List Items Addressed This Visit           ICD-10-CM    Gait abnormality - Primary R26.9    Plantar fasciitis of left foot M72.2    Left foot pain M79.672     Other Visit Diagnoses         Codes      Chronic midline low back pain with left-sided sciatica     M54.42, G89.29    Relevant Orders    Follow Up In Physical Therapy          Outcome: Oswestry Disablity Index (SIMON): score 27, 54 %   Autism: No  PT Evaluation Complexity: , Mod 04208,   Which of the following best describes the primary reason of therapy: Improving, restoring, or adapting functional mobility or skills  Surgery within last 3 months: No  Select all conditions that apply: BMI > 40 and Arthritis Conditions  Visits Requested: 10-12    Social Determinants of health:   Money    No  physical home environment   No  no job/ work conditions         No  education/literacy                   No  Childhood experiences           No  social supports/coping skills  No  unhealthy behaviors                   No  Limited access to healthcare               No

## 2025-06-17 ENCOUNTER — TREATMENT (OUTPATIENT)
Dept: PHYSICAL THERAPY | Facility: CLINIC | Age: 75
End: 2025-06-17
Payer: MEDICARE

## 2025-06-17 DIAGNOSIS — R26.9 GAIT ABNORMALITY: Primary | ICD-10-CM

## 2025-06-17 DIAGNOSIS — M54.42 CHRONIC MIDLINE LOW BACK PAIN WITH LEFT-SIDED SCIATICA: ICD-10-CM

## 2025-06-17 DIAGNOSIS — G89.29 CHRONIC MIDLINE LOW BACK PAIN WITH LEFT-SIDED SCIATICA: ICD-10-CM

## 2025-06-17 DIAGNOSIS — M72.2 PLANTAR FASCIITIS: ICD-10-CM

## 2025-06-17 DIAGNOSIS — M79.672 LEFT FOOT PAIN: ICD-10-CM

## 2025-06-17 DIAGNOSIS — M72.2 PLANTAR FASCIITIS OF LEFT FOOT: ICD-10-CM

## 2025-06-17 PROCEDURE — 97110 THERAPEUTIC EXERCISES: CPT | Mod: GP

## 2025-06-17 NOTE — PROGRESS NOTES
Physical Therapy                                                                                  Physical Therapy Treatment       Patient name: Rachael Shah  MRN:   [id number]  Today's Date: 6/17/2025       Time Calculation  Start Time: 0800  Stop Time: 0846  Time Calculation (min): 46 min    Assessment:  Patient completed session today with mod effort.  Patient tolerated treatment well without increased complaints of pain during or after session. Noted increased left ankle motion and control during therapeutic exercises this date and also less tenderness to palpation noted. Has been wearing B arch supports and patient reports positive results.   Patient will continue to benefit from PT to improve left ankle ROM, flexibility and strength to improve biomechanics and decrease pain.     Plan:   Monitor response to treatment and adjust plan as needed.   To continue with current POC with emphasis on decreasing pain and restoring full function L foot and ankle.   Add hip abductor and extensor strengthening.     .         Current Problems:       1. Gait abnormality        2. Chronic midline low back pain with left-sided sciatica  Follow Up In Physical Therapy      3. Plantar fasciitis of left foot  Follow Up In Physical Therapy      4. Left foot pain  Follow Up In Physical Therapy      5. Plantar fasciitis  Follow Up In Physical Therapy                General  Reason for Referral: plantar fasciitis  Referred By: Dr. CLAUDINE Peoples  Past Medical History Relevant to Rehab: Past medical history significant for right TKR; mild neuropathy (numbness); Hypertension, Hypercholesteremia, Paroxysmal atrial fibrillation and atrial tachycardia she had been chemically converted however converted back to A-fib has had 3 separate ablations she remains on anticoagulation with Xarelto and metoprolol for rate control; Severe DJD; right knee replacement; left shoulder replacement; Low back pain; Diabetes; Hearing loss now hearing  "aids  General Comment: Visit 3 of 5; Insurance: 06/09/2025: 5V PT APPROVED 06/05/2025-08/03/2025 APPROVED CODES: 42793, 18908, 43045, [id number], 47126    Precautions  STEADI Fall Risk Score (The score of 4 or more indicates an increased risk of falling): 3  Precautions Comment: h/o A-fib and A-tachycardia, R TKA, L TSA, DM; STEADI fall risk at 3, mild fall risk.    Subjective:  Patient reported she continues to wear the arch support system  she purchased from [employer] for about a week now.  States overall she is pleased with arch supports.   Currently reports left foot pain 4/10 and back pain 3/10.   Response to last session:  no problems   Performing HEP: yes    Pain  4/10 left foot/heel and 3/10 RLBP (no hamstring pain) at the start of session        Treatment:   Therapeutic exercises 40 mins  Recumbent scifit seat 11, level 1.0 x 10 mins     Seated  on edge of mat  -BAPS board left ankle, levels 2 - 1, DF/PF, Inv/Ever, CW/CCW x 25 reps  -left toe curls off edge of 2\" block, 3\" hold x 20 reps  -left toe extension splaying 3\" hold x 20 reps  -left heel raises 3\" hold x 20 reps   -left ankle DF 3\" hold x 20 reps     Half long sitting  -left ankle inversion/arch lift with yellow theraband 3\" hold 2 x 10 reps   -left ankle eversion with yellow theraband 3\" hold 2 x 10 reps  -L gastroc/hamstring stretch with strap 3 x 20\"         Manual Therapy 6 mins  -to improve tissue and joint mobility to allow for improved body/joint mechanics/ROM and to decrease pain.      Supine with head elevated and large bolster under knees:  -STM to left plantar fascia and insertion into calcaneous, achilles tendon  concurrent with dorsiflexion stretching to increase soft tissue extensibility and reduce pain.  Passive calcaneal eversion.     Education:  Continue with current HEP as instructed.        Goals    Physical Therapy - Physical Therapy - April 2025 Problems       Physical Therapy - Physical Therapy - April 2025 Problems " (Active)       PT Problem       Pain (Progressing)       Start:  04/18/25    Expected End:  06/13/25       Patient will report reduction of pain by  80%  to ease performance of bending and lifting, and driving greater than 30 mins.         ROM (Progressing)       Start:  04/18/25    Expected End:  06/13/25       Patient will demonstrate 25% improvement in lumbar AROM all planes and without pain,  to ease the performance of ADLS, bending and lifting to care for puppies and dogs.          Strength (Progressing)       Start:  04/18/25    Expected End:  06/13/25       Patient will increase trunk and LE strength by 1/3 MMT grade to facilitate endurance for good postural alignment, and to provide spinal protection during activity and static positions.         HEP (Met)       Start:  04/18/25    Expected End:  06/13/25    Resolved:  06/13/25    Patient will be independent and compliant with safe and recommended  HEP to enhance functional progress and long term management of condition.           Outcome (Progressing)       Start:  04/18/25    Expected End:  06/13/25       Patient will improve outcome measures score on  SIMON by 15%  to show a clinically significant improvement  in functional mobility.       Goal Note       Patient subjectively reporting improved tolerance for functional mobility, however Outcome score does not reflect changes.                    Physical Therapy - Plantar fascitis Problems       Physical Therapy - Plantar fascitis Problems (Active)       Mobility       LTG - Patient will demonstrate independence and compliance with safe and appropriate HEP for pain reduction, ROM/flexibility,  LE strength and postural correction.        Start:  06/05/25    Expected End:  09/03/25            LTG - Patient with increase LE strength hip and ankle by 1 MMT grade for improved functional strength with walking.        Start:  06/05/25    Expected End:  09/03/25            LTG - Patient will increase B dorsiflexion  range of motion to 10 degrees for improved functional stride length with walking.       Start:  06/05/25    Expected End:  09/03/25            LTG - Patient will increase 30 second sit to stand test to 10X for improved functional strength with transfers.        Start:  06/05/25    Expected End:  09/03/25            LTG - Patient will increase TUG time to 15 seconds for improved functional gait mobility.        Start:  06/05/25    Expected End:  09/03/25            LTG - Patient will demonstrate only mild sway with tandem stance x 20 seconds showing improved overall balance and decreased risk of falls.        Start:  06/05/25    Expected End:  09/03/25            STG - Patient will increase TUG time to 18 seconds for improved functional strength with transfers.        Start:  06/05/25    Expected End:  06/26/25            STG - Patient increase LE strength by 1/2 MMT grade for improved functional strength with walking.        Start:  06/05/25    Expected End:  06/26/25            Patient Goal 1 - Client will be able to walk with full weight bearing and normal mechanics and stride length with only 2-3/10 pain level for return to functional daily mobility.        Start:  06/05/25    Expected End:  09/03/25

## 2025-06-20 ENCOUNTER — APPOINTMENT (OUTPATIENT)
Dept: PHYSICAL THERAPY | Facility: CLINIC | Age: 75
End: 2025-06-20
Payer: MEDICARE

## 2025-06-20 DIAGNOSIS — M72.2 PLANTAR FASCIITIS OF LEFT FOOT: ICD-10-CM

## 2025-06-20 DIAGNOSIS — R26.9 GAIT ABNORMALITY: Primary | ICD-10-CM

## 2025-06-20 DIAGNOSIS — M79.672 LEFT FOOT PAIN: ICD-10-CM

## 2025-06-23 ENCOUNTER — APPOINTMENT (OUTPATIENT)
Dept: PHYSICAL THERAPY | Facility: CLINIC | Age: 75
End: 2025-06-23
Payer: MEDICARE

## 2025-06-23 DIAGNOSIS — R29.3 ABNORMAL POSTURE: ICD-10-CM

## 2025-06-23 DIAGNOSIS — M72.2 PLANTAR FASCIITIS OF LEFT FOOT: ICD-10-CM

## 2025-06-23 DIAGNOSIS — M79.672 LEFT FOOT PAIN: ICD-10-CM

## 2025-06-23 DIAGNOSIS — M62.81 WEAKNESS OF TRUNK MUSCULATURE: ICD-10-CM

## 2025-06-23 DIAGNOSIS — M53.80 DECREASED RANGE OF MOTION OF SPINE: ICD-10-CM

## 2025-06-23 DIAGNOSIS — M72.2 PLANTAR FASCIITIS: ICD-10-CM

## 2025-06-23 DIAGNOSIS — R26.9 GAIT ABNORMALITY: Primary | ICD-10-CM

## 2025-06-23 PROCEDURE — 97110 THERAPEUTIC EXERCISES: CPT | Mod: GP

## 2025-06-23 NOTE — PROGRESS NOTES
Physical Therapy                                                                                  Physical Therapy Treatment       Patient name: Rachael Shah  MRN:   [id number]  Today's Date: 6/23/2025  13   Visit 4 of 5      Time Calculation  Start Time: 1115  Stop Time: 1203  Time Calculation (min): 48 min    Assessment:     Patient completed session today with mod effort.  Patient tolerated treatment well without increased complaints of pain during or after session. Updated HEP as stated below.  Added hip and ankle strengthening to decrease strain to left foot and arch.    Patient will continue to benefit from PT to improve left ankle ROM, flexibility and strength to improve biomechanics and decrease pain.    Plan:   Monitor response to treatment and adjust plan as needed.   To continue with current POC with emphasis on decreasing pain and restoring full function L foot and ankle.   Add hip abductor and extensor strengthening.          Current Problems:       1. Gait abnormality        2. Abnormal posture        3. Decreased range of motion of spine        4. Weakness of trunk musculature        5. Plantar fasciitis of left foot  Follow Up In Physical Therapy      6. Left foot pain  Follow Up In Physical Therapy      7. Plantar fasciitis  Follow Up In Physical Therapy                General  Reason for Referral: plantar fasciitis  Referred By: Dr. CLAUDINE Peoples  Past Medical History Relevant to Rehab: Past medical history significant for right TKR; mild neuropathy (numbness); Hypertension, Hypercholesteremia, Paroxysmal atrial fibrillation and atrial tachycardia she had been chemically converted however converted back to A-fib has had 3 separate ablations she remains on anticoagulation with Xarelto and metoprolol for rate control; Severe DJD; right knee replacement; left shoulder replacement; Low back pain; Diabetes; Hearing loss now hearing aids  General Comment: Visit 4 of 5; Insurance: 06/09/2025: 5V PT  "APPROVED 06/05/2025-08/03/2025 APPROVED CODES: 58951, [id number], [id number], [id number], [id number]    Precautions  STEADI Fall Risk Score (The score of 4 or more indicates an increased risk of falling): 3  Precautions Comment: h/o A-fib and A-tachycardia, R TKA, L TSA, DM; STEADI fall risk at 3, mild fall risk.    Subjective:  Patient reported 3/10 heel of left foot.   Better in the AM, but by the end of the day it is worse. Not necessarily dependent on weight bearing and activity.   No problems with orthotics.  Response to last session:   no problems.   Performing HEP: yes     Pain  3/10 at the start of session  No number given at the end of session.      Treatment:   Therapeutic exercises  - to decrease pain, increase ROM, increase strength, to improve function and mobility.    Recumbent scifit, seat 11, level 1.4 x 5 mins, 1.8 x 5 mins     Seated  on edge of mat  -BAPS board left ankle, levels 2 - 1, DF/PF, Inv/Ever, CW/CCW x 25 reps  -left toe curls off edge of 2\" block, 3\" hold x 20 reps  -left toe extension splaying 3\" hold x 20 reps  -left heel raises 3\" hold x 10 reps   -B heel raises with small ball between heels 2 x 10 reps      Half long sitting  -left ankle inversion/arch lift with orange theraband 3\" hold 2 x 10 reps   -left ankle eversion, DF and PF with orange theraband 3\" hold 2 x 10 reps     Sidelying  Hip abduction R/L 2 x 10  Clamshells R/L 2 x 10     Standing  Hip flexor stretch R/L at steps 3 x 20\"  SLS R/L x 60\" with light UE touch    Manual Therapy - deferred this date due to time constraints     Education:  Updated HEP.  Written instructions and orange theraband provided.  Access Code: [email or web address]  URL: [email or web address]  Date: 06/23/2025  Prepared by: Cari Aquino    Exercises  - Seated Toe Curl  - 1 x daily - 7 x weekly - 2 sets - 10 reps - 5\" hold  - Toe Spreading  - 1 x daily - 7 x weekly - 2 sets - 10 reps - 5\" hold  - Seated Self Great Toe Mobilization  - 1 x daily - 7 x weekly - " "2 sets - 10 reps - 5\" hold  - Seated Calf Raise With Small Ball at Heels  - 1 x daily - 7 x weekly - 2 sets - 10 reps  - Ankle Dorsiflexion with Resistance  - 1 x daily - 7 x weekly - 3 sets - 10 reps  - Ankle Eversion with Resistance  - 1 x daily - 7 x weekly - 3 sets - 10 reps - 5\" hold  - Ankle Inversion with Resistance  - 1 x daily - 7 x weekly - 2 sets - 10 reps - 5\" hold  - Ankle and Toe Plantarflexion with Resistance  - 1 x daily - 7 x weekly - 2 sets - 10 reps - 5\" hold  - Sidelying Pelvic Floor Contraction with Hip Abduction  - 1 x daily - 7 x weekly - 2 sets - 10 reps - 5\" hold  - Clamshell with Resistance  - 1 x daily - 7 x weekly - 2 sets - 10 reps - 5\" hold  - Hip Flexor Stretch with Chair  - 1 x daily - 7 x weekly - 1 sets - 3 reps - 20\" hold  - Single Leg Stance with Support  - 1 x daily - 7 x weekly - 1 sets - 3-5 reps - 60\" hold       Goals    Physical Therapy - Physical Therapy - April 2025 Problems       Physical Therapy - Physical Therapy - April 2025 Problems (Active)       PT Problem       Pain (Progressing)       Start:  04/18/25    Expected End:  06/13/25       Patient will report reduction of pain by  80%  to ease performance of bending and lifting, and driving greater than 30 mins.         ROM (Progressing)       Start:  04/18/25    Expected End:  06/13/25       Patient will demonstrate 25% improvement in lumbar AROM all planes and without pain,  to ease the performance of ADLS, bending and lifting to care for puppies and dogs.          Strength (Progressing)       Start:  04/18/25    Expected End:  06/13/25       Patient will increase trunk and LE strength by 1/3 MMT grade to facilitate endurance for good postural alignment, and to provide spinal protection during activity and static positions.         HEP (Met)       Start:  04/18/25    Expected End:  06/13/25    Resolved:  06/13/25    Patient will be independent and compliant with safe and recommended  HEP to enhance functional progress " and long term management of condition.           Outcome (Progressing)       Start:  04/18/25    Expected End:  06/13/25       Patient will improve outcome measures score on  SIMON by 15%  to show a clinically significant improvement  in functional mobility.       Goal Note       Patient subjectively reporting improved tolerance for functional mobility, however Outcome score does not reflect changes.                    Physical Therapy - Plantar fascitis Problems       Physical Therapy - Plantar fascitis Problems (Active)       Mobility       LTG - Patient will demonstrate independence and compliance with safe and appropriate HEP for pain reduction, ROM/flexibility,  LE strength and postural correction.        Start:  06/05/25    Expected End:  09/03/25            LTG - Patient with increase LE strength hip and ankle by 1 MMT grade for improved functional strength with walking.        Start:  06/05/25    Expected End:  09/03/25            LTG - Patient will increase B dorsiflexion range of motion to 10 degrees for improved functional stride length with walking.       Start:  06/05/25    Expected End:  09/03/25            LTG - Patient will increase 30 second sit to stand test to 10X for improved functional strength with transfers.        Start:  06/05/25    Expected End:  09/03/25            LTG - Patient will increase TUG time to 15 seconds for improved functional gait mobility.        Start:  06/05/25    Expected End:  09/03/25            LTG - Patient will demonstrate only mild sway with tandem stance x 20 seconds showing improved overall balance and decreased risk of falls.        Start:  06/05/25    Expected End:  09/03/25            STG - Patient will increase TUG time to 18 seconds for improved functional strength with transfers.        Start:  06/05/25    Expected End:  06/26/25            STG - Patient increase LE strength by 1/2 MMT grade for improved functional strength with walking.        Start:  06/05/25     Expected End:  06/26/25            Patient Goal 1 - Client will be able to walk with full weight bearing and normal mechanics and stride length with only 2-3/10 pain level for return to functional daily mobility.        Start:  06/05/25    Expected End:  09/03/25

## 2025-06-25 ENCOUNTER — APPOINTMENT (OUTPATIENT)
Dept: PHYSICAL THERAPY | Facility: CLINIC | Age: 75
End: 2025-06-25
Payer: MEDICARE

## 2025-06-25 DIAGNOSIS — R29.3 ABNORMAL POSTURE: ICD-10-CM

## 2025-06-25 DIAGNOSIS — M72.2 PLANTAR FASCIITIS OF LEFT FOOT: ICD-10-CM

## 2025-06-25 DIAGNOSIS — R26.9 GAIT ABNORMALITY: Primary | ICD-10-CM

## 2025-06-25 DIAGNOSIS — M62.81 WEAKNESS OF TRUNK MUSCULATURE: ICD-10-CM

## 2025-06-25 DIAGNOSIS — M79.672 LEFT FOOT PAIN: ICD-10-CM

## 2025-06-25 DIAGNOSIS — M53.80 DECREASED RANGE OF MOTION OF SPINE: ICD-10-CM

## 2025-07-07 ENCOUNTER — TREATMENT (OUTPATIENT)
Dept: PHYSICAL THERAPY | Facility: CLINIC | Age: 75
End: 2025-07-07
Payer: MEDICARE

## 2025-07-07 DIAGNOSIS — M72.2 PLANTAR FASCIITIS: ICD-10-CM

## 2025-07-07 DIAGNOSIS — M72.2 PLANTAR FASCIITIS OF LEFT FOOT: ICD-10-CM

## 2025-07-07 DIAGNOSIS — M79.672 LEFT FOOT PAIN: ICD-10-CM

## 2025-07-07 DIAGNOSIS — R26.9 GAIT ABNORMALITY: Primary | ICD-10-CM

## 2025-07-07 PROCEDURE — 97140 MANUAL THERAPY 1/> REGIONS: CPT | Mod: GP | Performed by: PHYSICAL THERAPIST

## 2025-07-07 PROCEDURE — 97530 THERAPEUTIC ACTIVITIES: CPT | Mod: GP | Performed by: PHYSICAL THERAPIST

## 2025-07-07 PROCEDURE — 97110 THERAPEUTIC EXERCISES: CPT | Mod: GP | Performed by: PHYSICAL THERAPIST

## 2025-07-07 NOTE — PROGRESS NOTES
Physical Therapy                                                                                  Physical Therapy Treatment / PT Recheck for plantar fascitis with reauthorization requested      Patient name: Rachael Shah  MRN:   [id number]  Today's Date: 7/7/2025     Time Calculation  Start Time: 1115  Stop Time: 1208  Time Calculation (min): 53 min    1. Gait abnormality        2. Plantar fasciitis of left foot  Follow Up In Physical Therapy      3. Left foot pain  Follow Up In Physical Therapy      4. Plantar fasciitis  Follow Up In Physical Therapy          PT  Visit  PT Received On: 07/07/25  Response to Previous Treatment: Patient with no complaints from previous session.  General  Reason for Referral: plantar fasciitis  Referred By: Dr. CLAUDINE Peoples  Past Medical History Relevant to Rehab: Past medical history significant for right TKR; mild neuropathy (numbness); Hypertension, Hypercholesteremia, Paroxysmal atrial fibrillation and atrial tachycardia she had been chemically converted however converted back to A-fib has had 3 separate ablations she remains on anticoagulation with Xarelto and metoprolol for rate control; Severe DJD; right knee replacement; left shoulder replacement; Low back pain; Diabetes; Hearing loss now hearing aids  General Comment: Visit 5 of 5; Insurance: 06/09/2025: 5V PT APPROVED 06/05/2025-08/03/2025 APPROVED CODES: 30830, [id number], 30093, [id number], [id number]    Assessment:  Patient is a 74 year  female old who presented to Physical therapy secondary to left plantar fascitis pain causing gait abnormality.  She is currently undergoing physical therapy for her back as well as for her foot. Upon PT evaluation the patient presented with the following deficits: decreased ankle and hip strength, abnormal posture, decreased ankle range of motion and flexibility, tight and restricted plantar fascia and gastroc/soleus complex, decreased balance and stability.   The above deficits were limiting  patient's ability to functionally stand and walk for periods of time, decreased overall balance, decreased ability to perform daily ADLs due to pain.  Client shows improved range of motion, strength, flexibility and balance with decreased pain intensity and frequency allowing for improved functional standing and walking tolerance.  She is still having 1-3/10 pain with 1st steps in the morning, after sitting and after being on her feet too long and still shows lack of dorsiflexion flexibility. Patient will benefit from further skilled PT intervention to allow the patient to progress to the goal of decreasing pain and improving range of motion and strength to improve overall functional mobility.  PT will monitor progress towards goals and adjust intervention as appropriate. Patient completed session today with moderate effort with no difficulty with treatment session.      Plan:    POC 5/13- continue 2X/week (awaiting authorization for further visits)-Monitor response to treatment and adjust plan as needed.   To continue with current POC with emphasis on decreasing pain and restoring full function L foot and ankle.   Add hip abductor and extensor strengthening. Continued with foot stretching and strengthening.        OP PT Plan  Treatment/Interventions: Cryotherapy, Dry needling, Education/ Instruction, Manual therapy, Neuromuscular re-education, Self care/ home management, Taping techniques, Therapeutic activities, Therapeutic exercises  PT Plan: Skilled PT  PT Frequency: 2 times per week  Duration: 4 more weeks for total of 13 visits   Onset Date: 06/05/25  Number of Treatments Authorized: Auth required  Rehab Potential: Good  Plan of Care Agreement: Patient  Plan of care discussed with patient and patient agrees with plan    Subjective:  Patient reports she is having less heel pain. Pain is less intense overall and is less frequent. Seems to be worse 1st steps after sitting in the morning and takes about 15-20  minutes today go way. She notes improved functional standing and walking tolerance.     High pain levels up to 3/10, 0/10 pain at rest. Left heel.     Performing HEP: yes    Precautions  Precautions Comment: h/o A-fib and A-tachycardia, R TKA, L TSA, DM; STEADI fall risk at 3, mild fall risk.    Pain   High pain levels up to 3/10, 0/10 pain at rest. Left heel.     Objective:     Balance:   Feet together balance eyes open GOOD  Feet together balance eyes closed FAIR + mild sway  Tandem stance mild sway X 20 seconds      HIP  Specific Lower Extremity MMT  R Iliopsoas: (5/5): 4+/5  L Iliopsoas: (5/5): 4+/5  R Gluteals (sidelying): (5/5): 4+/5  L Gluteals (sidelying): (5/5): 4+/5  R knee flexion: (5/5): 4+/5  L knee flexion: (5/5): 4+/5  R knee extension: (5/5): 4+/5  L knee extension: (5/5): 4+/5     Functional Rating Scale  LEFS   : 40/80     Observation  Observation Comment: pes planus feet with fallen arch and hip in external rotation on the left, improved weight bearing in standing on the left leg with heel flat on the ground.     Ankle Palpation/Joint Mobility Assessment  Palpation/Joint Mobility Comment: Decreased tightness and tenderness left plantar fascia, heel, achilles, gastroc/soleus complex.     Ankle AROM  R ankle dorsiflexion: (10°): 5  L ankle dorsiflexion: (10°): 2  R ankle plantarflexion: (40°): 35  L ankle plantarflexion: (40°): 40   R ankle inversion: (30°): 30  L ankle inversion: (30°): 35  R ankle eversion: (20°): 20  L ankle eversion: (20°): 15     Ankle MMT  R ankle dorsiflexion: (5/5): 4/5  L ankle dorsiflexion: (5/5): 4-/5  R ankle plantarflexion: (5/5): 4/5  L ankle plantarflexion: (5/5): 4-/5  R ankle inversion: (5/5): 4/5  L ankle inversion: (5/5): 4-/5  R ankle eversion: (5/5): 4/5  L ankle eversion: (5/5): 4-/5       Joint Mobility Testing  Joint Mobility Comment: Mild stiffness with subtalar mobilization and inversion and eversion mobilizations.     Gait  Gait Comment: Client ambulated  with improved amairani, step length and height, left hip external rotation, improved dorsiflexion during swing phase of gait, no heel toe gait pattern, and antalgic gait.     Flexibility  Flexibility Comment: Tight gastroc/soleus complex left     Outcome Measures:  Timed Up and Go Test  How many seconds did it take to complete the 5 tasks?: 22 seconds at evaluation improved to 12.82 seconds  7/7/25     Other Measures  30 Second Sit to Stand: 5 at initial evaluation improved to 9X 7/7/25  Lower Extremity Funtional Score (LEFS): 40/80 at evaluation, improved to 44/80 7/7/25    Treatment:    Therapeutic Activity (12102):   8 MINUTES  Therapeutic activity completed to address balance and stability to decrease fall risk and improve functional mobility.         -Recheck performed with AROM and strength measurements taken with functional assessment of gait speed and transfer ability.     Manual Therapy (99679):  Manual therapy completed this date to improve tissue and joint mobility to allow for improved body/joint mechanics/ROM and to decrease pain.   30minutes      -left plantar fascia strumming and release, left plantar fascia insertion at calcaneous TFM, left achilles TFM, left gastroc soleus soft tissue mobilization to increase soft tissue extensibility and reduce pain    -calcaneal distraction with gentle inversion and eversion mobilizations Grade II with posterior subtalar mobilizations grade III to increase joint mobility     Therapeutic Exercise (35264):   15 MINUTES  Therapeutic exercises completed this date to improve strength and postural control to improve ability to perform functional activities safely.        -supine calf stretch with strap 3X hold 30 seconds  -longsit AROM left ankle PF/DF, circles clockwise and counterclockwise 20X and alphabet 1X  -orange t-band PF/DF, INV/EVER 20X each  -standing heel raises 3X10  -standing calf stretch off step 3X hold 30 seconds    Education:   Continue with current HEP  "  Access Code: [email or web address]  URL: [email or web address]  Date: 06/23/2025  Prepared by: Cari Aquino     Exercises  - Seated Toe Curl  - 1 x daily - 7 x weekly - 2 sets - 10 reps - 5\" hold  - Toe Spreading  - 1 x daily - 7 x weekly - 2 sets - 10 reps - 5\" hold  - Seated Self Great Toe Mobilization  - 1 x daily - 7 x weekly - 2 sets - 10 reps - 5\" hold  - Seated Calf Raise With Small Ball at Heels  - 1 x daily - 7 x weekly - 2 sets - 10 reps  - Ankle Dorsiflexion with Resistance  - 1 x daily - 7 x weekly - 3 sets - 10 reps  - Ankle Eversion with Resistance  - 1 x daily - 7 x weekly - 3 sets - 10 reps - 5\" hold  - Ankle Inversion with Resistance  - 1 x daily - 7 x weekly - 2 sets - 10 reps - 5\" hold  - Ankle and Toe Plantarflexion with Resistance  - 1 x daily - 7 x weekly - 2 sets - 10 reps - 5\" hold  - Sidelying Pelvic Floor Contraction with Hip Abduction  - 1 x daily - 7 x weekly - 2 sets - 10 reps - 5\" hold  - Clamshell with Resistance  - 1 x daily - 7 x weekly - 2 sets - 10 reps - 5\" hold  - Hip Flexor Stretch with Chair  - 1 x daily - 7 x weekly - 1 sets - 3 reps - 20\" hold  - Single Leg Stance with Support  - 1 x daily - 7 x weekly - 1 sets - 3-5 reps - 60\" hold    Heike Donnelly, PT    Active       Mobility       LTG - Patient will demonstrate independence and compliance with safe and appropriate HEP for pain reduction, ROM/flexibility,  LE strength and postural correction.  (Progressing)       Start:  06/05/25    Expected End:  09/03/25            LTG - Patient with increase LE strength hip and ankle by 1 MMT grade for improved functional strength with walking.  (Progressing)       Start:  06/05/25    Expected End:  09/03/25            LTG - Patient will increase B dorsiflexion range of motion to 10 degrees for improved functional stride length with walking. (Progressing)       Start:  06/05/25    Expected End:  09/03/25            LTG - Patient will increase 30 second sit to stand test to " 10X for improved functional strength with transfers.  (Progressing)       Start:  06/05/25    Expected End:  09/03/25         Goal Note       9X               LTG - Patient will increase TUG time to 15 seconds for improved functional gait mobility.  (Met)       Start:  06/05/25    Expected End:  09/03/25    Resolved:  07/07/25         LTG - Patient will demonstrate only mild sway with tandem stance x 20 seconds showing improved overall balance and decreased risk of falls.  (Met)       Start:  06/05/25    Expected End:  09/03/25    Resolved:  07/07/25         STG - Patient will increase TUG time to 18 seconds for improved functional strength with transfers.  (Met)       Start:  06/05/25    Expected End:  07/07/25    Resolved:  07/07/25         STG - Patient increase LE strength by 1/2 MMT grade for improved functional strength with walking.  (Met)       Start:  06/05/25    Expected End:  07/07/25    Resolved:  07/07/25         Patient Goal 1 - Client will be able to walk with full weight bearing and normal mechanics and stride length with only 2-3/10 pain level for return to functional daily mobility.  (Progressing)       Start:  06/05/25    Expected End:  09/03/25

## 2025-07-10 ENCOUNTER — APPOINTMENT (OUTPATIENT)
Dept: PHYSICAL THERAPY | Facility: CLINIC | Age: 75
End: 2025-07-10
Payer: MEDICARE

## 2025-07-10 DIAGNOSIS — R26.9 GAIT ABNORMALITY: Primary | ICD-10-CM

## 2025-07-10 DIAGNOSIS — M79.672 LEFT FOOT PAIN: ICD-10-CM

## 2025-07-10 DIAGNOSIS — M53.80 DECREASED RANGE OF MOTION OF SPINE: ICD-10-CM

## 2025-07-10 DIAGNOSIS — R29.3 ABNORMAL POSTURE: ICD-10-CM

## 2025-07-10 DIAGNOSIS — M62.81 WEAKNESS OF TRUNK MUSCULATURE: ICD-10-CM

## 2025-07-10 DIAGNOSIS — M72.2 PLANTAR FASCIITIS OF LEFT FOOT: ICD-10-CM

## 2025-07-14 ENCOUNTER — APPOINTMENT (OUTPATIENT)
Dept: PHYSICAL THERAPY | Facility: CLINIC | Age: 75
End: 2025-07-14
Payer: MEDICARE

## 2025-07-14 DIAGNOSIS — M72.2 PLANTAR FASCIITIS: ICD-10-CM

## 2025-07-14 DIAGNOSIS — M72.2 PLANTAR FASCIITIS OF LEFT FOOT: ICD-10-CM

## 2025-07-14 DIAGNOSIS — M62.81 WEAKNESS OF TRUNK MUSCULATURE: ICD-10-CM

## 2025-07-14 DIAGNOSIS — M79.672 LEFT FOOT PAIN: ICD-10-CM

## 2025-07-14 DIAGNOSIS — R26.9 GAIT ABNORMALITY: Primary | ICD-10-CM

## 2025-07-14 DIAGNOSIS — M53.80 DECREASED RANGE OF MOTION OF SPINE: ICD-10-CM

## 2025-07-14 DIAGNOSIS — R29.3 ABNORMAL POSTURE: ICD-10-CM

## 2025-07-14 PROCEDURE — 97110 THERAPEUTIC EXERCISES: CPT | Mod: GP | Performed by: PHYSICAL THERAPIST

## 2025-07-14 PROCEDURE — 97140 MANUAL THERAPY 1/> REGIONS: CPT | Mod: GP | Performed by: PHYSICAL THERAPIST

## 2025-07-14 NOTE — PROGRESS NOTES
Physical Therapy                                                                                  Physical Therapy Treatment       Patient name: Rachael Shah  MRN:   [id number]  Today's Date: 7/14/2025     Time Calculation  Start Time: 1520  Stop Time: 1600  Time Calculation (min): 40 min    1. Gait abnormality        2. Plantar fasciitis of left foot  Follow Up In Physical Therapy      3. Left foot pain  Follow Up In Physical Therapy      4. Plantar fasciitis  Follow Up In Physical Therapy          PT  Visit  PT Received On: 07/14/25  Response to Previous Treatment: Patient with no complaints from previous session.  General  Reason for Referral: plantar fasciitis  Referred By: Dr. CLAUDINE Peoples  Past Medical History Relevant to Rehab: Past medical history significant for right TKR; mild neuropathy (numbness); Hypertension, Hypercholesteremia, Paroxysmal atrial fibrillation and atrial tachycardia she had been chemically converted however converted back to A-fib has had 3 separate ablations she remains on anticoagulation with Xarelto and metoprolol for rate control; Severe DJD; right knee replacement; left shoulder replacement; Low back pain; Diabetes; Hearing loss now hearing aids  General Comment: Visit 6 of 10; Insurance: 06/09/2025: 5V PT APPROVED 06/05/2025-08/03/2025 APPROVED CODES: 90908, [id number], [id number], [id number], [id number]---1/9/25: 5V PT FOR [employer]  7/08/2025-9/05/2025    Assessment:  Patient completed session today with moderate effort and had difficulty with attempted heel raises with eccentric lowering. Too much pain during motion, therefore stopped. .  Patient demonstrated improvement with stride length and weight bearing left side with walking today. Pain is averaging between 1-3/10.   Advanced program with addition of BOSU SL knee drivers. Patient will continue to benefit from PT to improve ankle mobility and strength and range of motion to work towards goals of decreased pain and improved  functional walking tolerance.      Plan:        POC 6/13- (reaval at visit#10 due to AUTH) continue 1-2X/week -Monitor response to treatment and adjust plan as needed.   To continue with current POC with emphasis on decreasing pain and restoring full function L foot and ankle.   Continued with foot stretching and strengthening.        Subjective:  Patient reports 3/10 left plantar fascia/heel pain .   She notes she stood a lot at the dog show and notes she did not have major increase in pain. Mainly 3/10 heel pain. She notes she feels overall the pain in the heel is less intense and is occurring less often.    Performing HEP: yes    Precautions  Precautions Comment: h/o A-fib and A-tachycardia, R TKA, L TSA, DM; STEADI fall risk at 3, mild fall risk.    Pain   3/10 left plantar fascia pain    Objective:   NT    Treatment:    Manual Therapy (53612):  Manual therapy completed this date to improve tissue and joint mobility to allow for improved body/joint mechanics/ROM and to decrease pain.   17minutes      -left plantar fascia strumming and release, left plantar fascia insertion at calcaneous TFM, left achilles TFM, to increase soft tissue extensibility and reduce pain     -calcaneal distraction with gentle inversion and eversion mobilizations Grade II with posterior subtalar mobilizations grade III to increase joint mobility    Therapeutic Exercise (88354):   23 MINUTES  Therapeutic exercises completed this date to improve strength and postural control to improve ability to perform functional activities safely.        -supine calf stretch with strap 3X hold 30 seconds  -longsit AROM left ankle PF/DF, circles clockwise and counterclockwise 20X and alphabet 1X  -orange t-band PF/DF, INV/EVER 20X each  -standing heel raises with attempted eccentric lowering 1X10-stopped due to pain   -standing calf stretch off step 3X hold 30 seconds  -Step up knee drivers onto BOSU 15X left leg leading.    Education:   Continue with  current HEP, no progression for home    Heike Donnelly, PT    Active       Mobility       LTG - Patient will demonstrate independence and compliance with safe and appropriate HEP for pain reduction, ROM/flexibility,  LE strength and postural correction.  (Progressing)       Start:  06/05/25    Expected End:  09/03/25            LTG - Patient with increase LE strength hip and ankle by 1 MMT grade for improved functional strength with walking.  (Progressing)       Start:  06/05/25    Expected End:  09/03/25            LTG - Patient will increase B dorsiflexion range of motion to 10 degrees for improved functional stride length with walking. (Progressing)       Start:  06/05/25    Expected End:  09/03/25            LTG - Patient will increase 30 second sit to stand test to 10X for improved functional strength with transfers.  (Progressing)       Start:  06/05/25    Expected End:  09/03/25         Goal Note       9X               LTG - Patient will increase TUG time to 15 seconds for improved functional gait mobility.  (Met)       Start:  06/05/25    Expected End:  09/03/25    Resolved:  07/07/25         LTG - Patient will demonstrate only mild sway with tandem stance x 20 seconds showing improved overall balance and decreased risk of falls.  (Met)       Start:  06/05/25    Expected End:  09/03/25    Resolved:  07/07/25         STG - Patient will increase TUG time to 18 seconds for improved functional strength with transfers.  (Met)       Start:  06/05/25    Expected End:  07/07/25    Resolved:  07/07/25         STG - Patient increase LE strength by 1/2 MMT grade for improved functional strength with walking.  (Met)       Start:  06/05/25    Expected End:  07/07/25    Resolved:  07/07/25         Patient Goal 1 - Client will be able to walk with full weight bearing and normal mechanics and stride length with only 2-3/10 pain level for return to functional daily mobility.  (Progressing)       Start:  06/05/25    Expected  End:  09/03/25

## 2025-07-16 ENCOUNTER — APPOINTMENT (OUTPATIENT)
Dept: PHYSICAL THERAPY | Facility: CLINIC | Age: 75
End: 2025-07-16
Payer: MEDICARE

## 2025-07-16 DIAGNOSIS — R26.9 GAIT ABNORMALITY: Primary | ICD-10-CM

## 2025-07-16 DIAGNOSIS — M53.80 DECREASED RANGE OF MOTION OF SPINE: ICD-10-CM

## 2025-07-16 DIAGNOSIS — M72.2 PLANTAR FASCIITIS OF LEFT FOOT: ICD-10-CM

## 2025-07-16 DIAGNOSIS — M62.81 WEAKNESS OF TRUNK MUSCULATURE: ICD-10-CM

## 2025-07-16 DIAGNOSIS — M79.672 LEFT FOOT PAIN: ICD-10-CM

## 2025-07-16 DIAGNOSIS — R29.3 ABNORMAL POSTURE: ICD-10-CM

## 2025-07-18 ENCOUNTER — TREATMENT (OUTPATIENT)
Dept: PHYSICAL THERAPY | Facility: CLINIC | Age: 75
End: 2025-07-18
Payer: MEDICARE

## 2025-07-18 DIAGNOSIS — M79.672 LEFT FOOT PAIN: ICD-10-CM

## 2025-07-18 DIAGNOSIS — R26.9 GAIT ABNORMALITY: ICD-10-CM

## 2025-07-18 DIAGNOSIS — R29.3 ABNORMAL POSTURE: ICD-10-CM

## 2025-07-18 DIAGNOSIS — M72.2 PLANTAR FASCIITIS OF LEFT FOOT: ICD-10-CM

## 2025-07-18 DIAGNOSIS — M62.81 WEAKNESS OF TRUNK MUSCULATURE: ICD-10-CM

## 2025-07-18 DIAGNOSIS — M53.80 DECREASED RANGE OF MOTION OF SPINE: Primary | ICD-10-CM

## 2025-07-18 DIAGNOSIS — G89.29 CHRONIC MIDLINE LOW BACK PAIN WITH LEFT-SIDED SCIATICA: ICD-10-CM

## 2025-07-18 DIAGNOSIS — M54.42 CHRONIC MIDLINE LOW BACK PAIN WITH LEFT-SIDED SCIATICA: ICD-10-CM

## 2025-07-18 PROCEDURE — 97110 THERAPEUTIC EXERCISES: CPT | Mod: GP

## 2025-07-18 NOTE — PROGRESS NOTES
Physical Therapy                                                                                  Physical Therapy Treatment       Patient name: Rachael Shah  MRN:   [id number]  Today's Date: 7/18/2025  14      Time Calculation  Start Time: 0844  Stop Time: 0933  Time Calculation (min): 49 min    Assessment:  Patient completed session today with mod effort.  No change in pain level at the end of session, however patient standing straighter at the end of session as compared to when she arrived. Tightness/decreased ROM noted with trunk and hip extension, and quad/hip flexor flexibility.   The patient will benefit from skilled PT services to improve functional abilities, decrease pain and return to PLOF and improve QoL.       Plan:    Manual therapy to B LS region and hips.   Upgrade HEP next LB visit.   Continue with Emma extension and hip strengthening, and stretching as able.       Monitor response to treatment and adjust plan as needed.   To continue with current POC.         Current Problems:       1. Decreased range of motion of spine        2. Abnormal posture        3. Weakness of trunk musculature        4. Gait abnormality        5. Plantar fasciitis of left foot        6. Left foot pain        7. Chronic midline low back pain with left-sided sciatica  Follow Up In Physical Therapy                General  Reason for Referral: chronic midline LBP  Referred By: Dr. CLAUDINE Peoples  Past Medical History Relevant to Rehab: Past medical history significant for right TKR; mild neuropathy (numbness); Hypertension, Hypercholesteremia, Paroxysmal atrial fibrillation and atrial tachycardia she had been chemically converted however converted back to A-fib has had 3 separate ablations she remains on anticoagulation with Xarelto and metoprolol for rate control; Severe DJD; right knee replacement; left shoulder replacement; Low back pain; Diabetes; Hearing loss now hearing aids  General Comment: LBP:  TOTAL 14, 1  "of 5.   Insurance:  07/14/2025: 5V PT APPROVED FOR BACK PAIN   7/8/2025 - 8/6/2025   APPROVED CODES: [id number], [id number], [id number]    Precautions  Precautions Comment: h/o A-fib and A-tachycardia, R TKA, L TSA, DM; STEADI fall risk at 3, mild fall risk.    Subjective:  Patient reported LBP is better, but still present.  Pain rated rated 2/10 in right LB and hamstring region.  Denies pain below knee.  States overall, she is making progress.   Response to last session:   no problems  Performing HEP: yes    Pain  2/10 at the start  and end of session      Treatment:    Therapeutic exercises 49 mins  -to decrease pain, increase ROM and strength,    Recumbent scifit, seat 12, level 1.3 x 10 mins     Hooklying  PPT 5\" hold x 15  TA bracing with deep breathing 5\" hold x 15   TA bracing with alt R/L bent knee raise 2 x 20  B hip ADD ball squeeze 2-3\" hold 2 x 10  B hip ABD  with blue theraband 5\" hold 1 x 20  R/L alt bent knee raise with blue theraband 5\" hold 1 x 20  SLR R/L 4 x 5 reps   Bridges 2 x 5 reps    Prone  Prone on elbows 2 x 1 min  R/L knee flexion 1 x 10 reps    Education:  Continue with current HEP as instructed.  Next visit: assess current HEP and progress as appropriate.        Goals    Physical Therapy - Physical Therapy - April 2025 Problems       Physical Therapy - Physical Therapy - April 2025 Problems (Active)       PT Problem       Pain (Progressing)       Start:  04/18/25    Expected End:  06/13/25       Patient will report reduction of pain by  80%  to ease performance of bending and lifting, and driving greater than 30 mins.         ROM (Progressing)       Start:  04/18/25    Expected End:  06/13/25       Patient will demonstrate 25% improvement in lumbar AROM all planes and without pain,  to ease the performance of ADLS, bending and lifting to care for puppies and dogs.          Strength (Progressing)       Start:  04/18/25    Expected End:  06/13/25       Patient will increase trunk and LE strength by 1/3 " MMT grade to facilitate endurance for good postural alignment, and to provide spinal protection during activity and static positions.         HEP (Met)       Start:  04/18/25    Expected End:  06/13/25    Resolved:  06/13/25    Patient will be independent and compliant with safe and recommended  HEP to enhance functional progress and long term management of condition.           Outcome (Progressing)       Start:  04/18/25    Expected End:  06/13/25       Patient will improve outcome measures score on  SIMON by 15%  to show a clinically significant improvement  in functional mobility.       Goal Note       Patient subjectively reporting improved tolerance for functional mobility, however Outcome score does not reflect changes.                    Physical Therapy - Plantar fascitis Problems       Physical Therapy - Plantar fascitis Problems (Active)       Mobility       LTG - Patient will demonstrate independence and compliance with safe and appropriate HEP for pain reduction, ROM/flexibility,  LE strength and postural correction.  (Progressing)       Start:  06/05/25    Expected End:  09/03/25            LTG - Patient with increase LE strength hip and ankle by 1 MMT grade for improved functional strength with walking.  (Progressing)       Start:  06/05/25    Expected End:  09/03/25            LTG - Patient will increase B dorsiflexion range of motion to 10 degrees for improved functional stride length with walking. (Progressing)       Start:  06/05/25    Expected End:  09/03/25            LTG - Patient will increase 30 second sit to stand test to 10X for improved functional strength with transfers.  (Progressing)       Start:  06/05/25    Expected End:  09/03/25         Goal Note       9X               LTG - Patient will increase TUG time to 15 seconds for improved functional gait mobility.  (Met)       Start:  06/05/25    Expected End:  09/03/25    Resolved:  07/07/25         LTG - Patient will demonstrate only mild  sway with tandem stance x 20 seconds showing improved overall balance and decreased risk of falls.  (Met)       Start:  06/05/25    Expected End:  09/03/25    Resolved:  07/07/25         STG - Patient will increase TUG time to 18 seconds for improved functional strength with transfers.  (Met)       Start:  06/05/25    Expected End:  07/07/25    Resolved:  07/07/25         STG - Patient increase LE strength by 1/2 MMT grade for improved functional strength with walking.  (Met)       Start:  06/05/25    Expected End:  07/07/25    Resolved:  07/07/25         Patient Goal 1 - Client will be able to walk with full weight bearing and normal mechanics and stride length with only 2-3/10 pain level for return to functional daily mobility.  (Progressing)       Start:  06/05/25    Expected End:  09/03/25

## 2025-07-21 ENCOUNTER — APPOINTMENT (OUTPATIENT)
Dept: PHYSICAL THERAPY | Facility: CLINIC | Age: 75
End: 2025-07-21
Payer: MEDICARE

## 2025-07-21 DIAGNOSIS — M62.81 WEAKNESS OF TRUNK MUSCULATURE: ICD-10-CM

## 2025-07-21 DIAGNOSIS — M53.80 DECREASED RANGE OF MOTION OF SPINE: ICD-10-CM

## 2025-07-21 DIAGNOSIS — R29.3 ABNORMAL POSTURE: ICD-10-CM

## 2025-07-21 DIAGNOSIS — M72.2 PLANTAR FASCIITIS: ICD-10-CM

## 2025-07-21 DIAGNOSIS — M79.672 LEFT FOOT PAIN: ICD-10-CM

## 2025-07-21 DIAGNOSIS — M72.2 PLANTAR FASCIITIS OF LEFT FOOT: ICD-10-CM

## 2025-07-21 DIAGNOSIS — R26.9 GAIT ABNORMALITY: Primary | ICD-10-CM

## 2025-07-21 PROCEDURE — 97110 THERAPEUTIC EXERCISES: CPT | Mod: GP | Performed by: PHYSICAL THERAPIST

## 2025-07-21 NOTE — PROGRESS NOTES
Physical Therapy                                                                                  Physical Therapy Treatment       Patient name: Rachael Shah  MRN:   [id number]  Today's Date: 7/21/2025  Visit 15     Time Calculation  Start Time: 1015  Stop Time: 1100  Time Calculation (min): 45 min  Therapeutic Exercise 40 minutes    Assessment:  Patient completed session today with mod effort.  She reported that her LBP had abolished and that her left heel pain had not changed at end of session.  Pt was able to perform all exercises with good technique.  The patient will benefit from skilled PT services to improve functional abilities, decrease pain and return to PLOF and improve QoL.         Plan:    Manual therapy to B LS region and hips.   Upgrade HEP next LB visit.   Continue with Emma extension and hip strengthening, and stretching as able.       Monitor response to treatment and adjust plan as needed.   To continue with current POC.         Current Problems:  1. Gait abnormality        2. Abnormal posture        3. Decreased range of motion of spine        4. Weakness of trunk musculature        5. Plantar fasciitis of left foot  Follow Up In Physical Therapy      6. Left foot pain  Follow Up In Physical Therapy      7. Plantar fasciitis  Follow Up In Physical Therapy        Subjective:  Pt reports that overall her LBP and left foot pain are improving.  She has less intense and frequent pain since beginning PT.  Pt reports LBP 3/10 and left heel pain 0-3/10 upon arrival.   Left heel pain only occurs in weight bearing.  States overall, she is making progress.   Response to last session:   no problems  Performing HEP: yes    Pain  3/10 LBP at the start  and end of session  0/10 left heel pain at rest in non weightbearing  Up to 3/10 left heel pain with weight bearing only  No change at end of session    Treatment:    Therapeutic exercises 40 mins  -to decrease pain, increase ROM and  "strength,    Recumbent scifit, seat 12, level 1.3 x 10 mins      Seated on edge of mat  -BAPS board left ankle, level 2, DF/PF, Inv/Ever, CW/CCW x 25 reps  -left toe curls off edge of 2\" block, 3\" hold x 25 reps  -left toe extension splaying 3\" hold x 25 reps  -bilateral heel raises 2 x 15 reps   -B heel raises with small ball between heels 2 x 15 reps      Half long sitting  -left ankle inversion with orange theraband 2 x 10 reps   -left ankle DF and PF with orange theraband2 x 10 reps   -bilateral ankle eversion 2 x 10 with orange band    Standing:  -standing calf stretch off step 3X hold 30 seconds bilateral  -Step up knee drivers onto BOSU x 15 L/R   -SLS R/L x 30 seconds with light UE touch      Education:  Continue with current HEP as instructed.  Next low back visit: assess current HEP and progress as appropriate.        Goals  Physical Therapy - Physical Therapy - April 2025 Problems       Physical Therapy - Physical Therapy - April 2025 Problems (Active)       PT Problem       Pain (Progressing)       Start:  04/18/25    Expected End:  06/13/25       Patient will report reduction of pain by  80%  to ease performance of bending and lifting, and driving greater than 30 mins.         ROM (Progressing)       Start:  04/18/25    Expected End:  06/13/25       Patient will demonstrate 25% improvement in lumbar AROM all planes and without pain,  to ease the performance of ADLS, bending and lifting to care for puppies and dogs.          Strength (Progressing)       Start:  04/18/25    Expected End:  06/13/25       Patient will increase trunk and LE strength by 1/3 MMT grade to facilitate endurance for good postural alignment, and to provide spinal protection during activity and static positions.         HEP (Met)       Start:  04/18/25    Expected End:  06/13/25    Resolved:  06/13/25    Patient will be independent and compliant with safe and recommended  HEP to enhance functional progress and long term management " of condition.           Outcome (Progressing)       Start:  04/18/25    Expected End:  06/13/25       Patient will improve outcome measures score on  SIMON by 15%  to show a clinically significant improvement  in functional mobility.       Goal Note       Patient subjectively reporting improved tolerance for functional mobility, however Outcome score does not reflect changes.                    Physical Therapy - Plantar fascitis Problems       Physical Therapy - Plantar fascitis Problems (Active)       Mobility       LTG - Patient will demonstrate independence and compliance with safe and appropriate HEP for pain reduction, ROM/flexibility,  LE strength and postural correction.  (Progressing)       Start:  06/05/25    Expected End:  09/03/25            LTG - Patient with increase LE strength hip and ankle by 1 MMT grade for improved functional strength with walking.  (Progressing)       Start:  06/05/25    Expected End:  09/03/25            LTG - Patient will increase B dorsiflexion range of motion to 10 degrees for improved functional stride length with walking. (Progressing)       Start:  06/05/25    Expected End:  09/03/25            LTG - Patient will increase 30 second sit to stand test to 10X for improved functional strength with transfers.  (Progressing)       Start:  06/05/25    Expected End:  09/03/25         Goal Note       9X               LTG - Patient will increase TUG time to 15 seconds for improved functional gait mobility.  (Met)       Start:  06/05/25    Expected End:  09/03/25    Resolved:  07/07/25         LTG - Patient will demonstrate only mild sway with tandem stance x 20 seconds showing improved overall balance and decreased risk of falls.  (Met)       Start:  06/05/25    Expected End:  09/03/25    Resolved:  07/07/25         STG - Patient will increase TUG time to 18 seconds for improved functional strength with transfers.  (Met)       Start:  06/05/25    Expected End:  07/07/25    Resolved:   07/07/25         STG - Patient increase LE strength by 1/2 MMT grade for improved functional strength with walking.  (Met)       Start:  06/05/25    Expected End:  07/07/25    Resolved:  07/07/25         Patient Goal 1 - Client will be able to walk with full weight bearing and normal mechanics and stride length with only 2-3/10 pain level for return to functional daily mobility.  (Progressing)       Start:  06/05/25    Expected End:  09/03/25                    Julissa Alejo, PT [id number]

## 2025-07-23 ENCOUNTER — APPOINTMENT (OUTPATIENT)
Dept: PHYSICAL THERAPY | Facility: CLINIC | Age: 75
End: 2025-07-23
Payer: MEDICARE

## 2025-07-23 DIAGNOSIS — M79.672 LEFT FOOT PAIN: ICD-10-CM

## 2025-07-23 DIAGNOSIS — M53.80 DECREASED RANGE OF MOTION OF SPINE: ICD-10-CM

## 2025-07-23 DIAGNOSIS — R29.3 ABNORMAL POSTURE: ICD-10-CM

## 2025-07-23 DIAGNOSIS — M62.81 WEAKNESS OF TRUNK MUSCULATURE: ICD-10-CM

## 2025-07-23 DIAGNOSIS — M72.2 PLANTAR FASCIITIS OF LEFT FOOT: ICD-10-CM

## 2025-07-23 DIAGNOSIS — R26.9 GAIT ABNORMALITY: Primary | ICD-10-CM

## 2025-07-25 ENCOUNTER — TREATMENT (OUTPATIENT)
Dept: PHYSICAL THERAPY | Facility: CLINIC | Age: 75
End: 2025-07-25
Payer: MEDICARE

## 2025-07-25 DIAGNOSIS — M72.2 PLANTAR FASCIITIS OF LEFT FOOT: ICD-10-CM

## 2025-07-25 DIAGNOSIS — M62.81 WEAKNESS OF TRUNK MUSCULATURE: ICD-10-CM

## 2025-07-25 DIAGNOSIS — M79.672 LEFT FOOT PAIN: ICD-10-CM

## 2025-07-25 DIAGNOSIS — R26.9 GAIT ABNORMALITY: Primary | ICD-10-CM

## 2025-07-25 DIAGNOSIS — M53.80 DECREASED RANGE OF MOTION OF SPINE: ICD-10-CM

## 2025-07-25 DIAGNOSIS — M54.42 CHRONIC MIDLINE LOW BACK PAIN WITH LEFT-SIDED SCIATICA: ICD-10-CM

## 2025-07-25 DIAGNOSIS — R29.3 ABNORMAL POSTURE: ICD-10-CM

## 2025-07-25 DIAGNOSIS — G89.29 CHRONIC MIDLINE LOW BACK PAIN WITH LEFT-SIDED SCIATICA: ICD-10-CM

## 2025-07-25 PROCEDURE — 97110 THERAPEUTIC EXERCISES: CPT | Mod: GP

## 2025-07-25 NOTE — PROGRESS NOTES
Physical Therapy                                                                                  Physical Therapy Treatment       Patient name: Rachael Shah  MRN:   [id number]  Today's Date: 7/25/2025  16      Time Calculation  Start Time: 1130  Stop Time: 1210  Time Calculation (min): 40 min    Assessment:  Patient completed session today with mod effort.   Tightness/decreased ROM noted with trunk and hip extension, and quad/hip flexor flexibility.  Decreased strength in trunk and hip extensors and hip abductors. Slight increase in central LBP at the end of session.   The patient will benefit from skilled PT services to improve functional abilities, decrease pain and return to PLOF and improve QoL.      Plan:    Manual therapy to B LS region and hips.   Upgrade HEP next LB visit.   Continue with Emma extension and hip strengthening, and stretching as able.       Monitor response to treatment and adjust plan as needed.   To continue with current POC.       Current Problems:       1. Gait abnormality        2. Abnormal posture        3. Decreased range of motion of spine        4. Weakness of trunk musculature        5. Plantar fasciitis of left foot        6. Left foot pain        7. Chronic midline low back pain with left-sided sciatica  Follow Up In Physical Therapy                General  Reason for Referral: chronic midline LBP  Referred By: Dr. CLAUDINE Peoples  Past Medical History Relevant to Rehab: Past medical history significant for right TKR; mild neuropathy (numbness); Hypertension, Hypercholesteremia, Paroxysmal atrial fibrillation and atrial tachycardia she had been chemically converted however converted back to A-fib has had 3 separate ablations she remains on anticoagulation with Xarelto and metoprolol for rate control; Severe DJD; right knee replacement; left shoulder replacement; Low back pain; Diabetes; Hearing loss now hearing aids  General Comment: LBP:  TOTAL 16, 2 of 5.   Insurance:   "07/14/2025: 5V PT APPROVED FOR BACK PAIN   7/8/2025 - 8/6/2025   APPROVED CODES: 34385, 58255, 14088    Precautions  Precautions Comment: h/o A-fib and A-tachycardia, R TKA, L TSA, DM; STEADI fall risk at 3, mild fall risk.    Subjective:  Patient reported central LBP rated 2/10 at present.  States increased pain in central LB couple days ago due to trying to sleep on her back.  Better this AM.   Response to last session:  no problems   Performing HEP: yes    Pain  2/10 at the start of session (central LB)  3/10 at the end of session (central LB)        Treatment:    Therapeutic exercises 40 mins   -to decrease pain, increase ROM and strength,     Prone  Prone lying x 2 mins  Prone on elbows with deep breathing 1 x 10   B glute sets 5\" hold 2 x 10   Press ups on forearms 3\" hold 2 x 10   R/L hamstring curls 2 x 10   B hip IR and ER with knees flexed 2 x 10 reps     Hooklying  APT/PPT 5\" hold 2 x 10 reps   R/L isometric hip/knee extension with small roll under knees  5\" hold 2 x 10  Bridges 3\" hold 2 x 10   LTR R/L 3\" hold 2 x 10     Sidelying  R/L hip abduction 2 x 10     Deferred the following this date:  TA bracing with deep breathing 5\" hold x 15   TA bracing with alt R/L bent knee raise 2 x 20  B hip ADD ball squeeze 2-3\" hold 2 x 10  B hip ABD  with blue theraband 5\" hold 1 x 20  R/L alt bent knee raise with blue theraband 5\" hold 1 x 20  SLR R/L 4 x 5 reps     Education:  Continue with current HEP.        Goals    Physical Therapy - Physical Therapy - April 2025 Problems       Physical Therapy - Physical Therapy - April 2025 Problems (Active)       PT Problem       Pain (Progressing)       Start:  04/18/25    Expected End:  06/13/25       Patient will report reduction of pain by  80%  to ease performance of bending and lifting, and driving greater than 30 mins.         ROM (Progressing)       Start:  04/18/25    Expected End:  06/13/25       Patient will demonstrate 25% improvement in lumbar AROM all planes " and without pain,  to ease the performance of ADLS, bending and lifting to care for puppies and dogs.          Strength (Progressing)       Start:  04/18/25    Expected End:  06/13/25       Patient will increase trunk and LE strength by 1/3 MMT grade to facilitate endurance for good postural alignment, and to provide spinal protection during activity and static positions.         HEP (Met)       Start:  04/18/25    Expected End:  06/13/25    Resolved:  06/13/25    Patient will be independent and compliant with safe and recommended  HEP to enhance functional progress and long term management of condition.           Outcome (Progressing)       Start:  04/18/25    Expected End:  06/13/25       Patient will improve outcome measures score on  SIMON by 15%  to show a clinically significant improvement  in functional mobility.       Goal Note       Patient subjectively reporting improved tolerance for functional mobility, however Outcome score does not reflect changes.                    Physical Therapy - Plantar fascitis Problems       Physical Therapy - Plantar fascitis Problems (Active)       Mobility       LTG - Patient will demonstrate independence and compliance with safe and appropriate HEP for pain reduction, ROM/flexibility,  LE strength and postural correction.  (Progressing)       Start:  06/05/25    Expected End:  09/03/25            LTG - Patient with increase LE strength hip and ankle by 1 MMT grade for improved functional strength with walking.  (Progressing)       Start:  06/05/25    Expected End:  09/03/25            LTG - Patient will increase B dorsiflexion range of motion to 10 degrees for improved functional stride length with walking. (Progressing)       Start:  06/05/25    Expected End:  09/03/25            LTG - Patient will increase 30 second sit to stand test to 10X for improved functional strength with transfers.  (Progressing)       Start:  06/05/25    Expected End:  09/03/25         Goal Note        9X               LTG - Patient will increase TUG time to 15 seconds for improved functional gait mobility.  (Met)       Start:  06/05/25    Expected End:  09/03/25    Resolved:  07/07/25         LTG - Patient will demonstrate only mild sway with tandem stance x 20 seconds showing improved overall balance and decreased risk of falls.  (Met)       Start:  06/05/25    Expected End:  09/03/25    Resolved:  07/07/25         STG - Patient will increase TUG time to 18 seconds for improved functional strength with transfers.  (Met)       Start:  06/05/25    Expected End:  07/07/25    Resolved:  07/07/25         STG - Patient increase LE strength by 1/2 MMT grade for improved functional strength with walking.  (Met)       Start:  06/05/25    Expected End:  07/07/25    Resolved:  07/07/25         Patient Goal 1 - Client will be able to walk with full weight bearing and normal mechanics and stride length with only 2-3/10 pain level for return to functional daily mobility.  (Progressing)       Start:  06/05/25    Expected End:  09/03/25

## 2025-07-30 ENCOUNTER — TREATMENT (OUTPATIENT)
Dept: PHYSICAL THERAPY | Facility: CLINIC | Age: 75
End: 2025-07-30
Payer: MEDICARE

## 2025-07-30 DIAGNOSIS — M79.672 LEFT FOOT PAIN: ICD-10-CM

## 2025-07-30 DIAGNOSIS — M72.2 PLANTAR FASCIITIS: ICD-10-CM

## 2025-07-30 DIAGNOSIS — R26.9 GAIT ABNORMALITY: Primary | ICD-10-CM

## 2025-07-30 DIAGNOSIS — M72.2 PLANTAR FASCIITIS OF LEFT FOOT: ICD-10-CM

## 2025-07-30 PROCEDURE — 97110 THERAPEUTIC EXERCISES: CPT | Mod: GP | Performed by: PHYSICAL THERAPIST

## 2025-07-30 PROCEDURE — 97140 MANUAL THERAPY 1/> REGIONS: CPT | Mod: GP | Performed by: PHYSICAL THERAPIST

## 2025-07-30 NOTE — PROGRESS NOTES
Physical Therapy                                                                                  Physical Therapy Treatment       Patient name: Rachael Shah  MRN:   [id number]  Today's Date: 7/30/2025     Time Calculation  Start Time: 0844  Stop Time: 0930  Time Calculation (min): 46 min    1. Gait abnormality        2. Plantar fasciitis of left foot  Follow Up In Physical Therapy      3. Left foot pain  Follow Up In Physical Therapy      4. Plantar fasciitis  Follow Up In Physical Therapy          PT  Visit  PT Received On: 07/30/25  Response to Previous Treatment: Patient with no complaints from previous session.  General  Reason for Referral: chronic midline LBP  Referred By: Dr. CLAUDINE Peoples  Past Medical History Relevant to Rehab: Past medical history significant for right TKR; mild neuropathy (numbness); Hypertension, Hypercholesteremia, Paroxysmal atrial fibrillation and atrial tachycardia she had been chemically converted however converted back to A-fib has had 3 separate ablations she remains on anticoagulation with Xarelto and metoprolol for rate control; Severe DJD; right knee replacement; left shoulder replacement; Low back pain; Diabetes; Hearing loss now hearing aids  General Comment: LBP:  TOTAL 16, 3 of 5.   Insurance:  07/14/2025: 5V PT APPROVED FOR BACK PAIN   7/8/2025 - 8/6/2025   APPROVED CODES: 43696, 53343, 16748    Assessment:  Patient completed session today with moderate effort and had difficulty with some heel pain with eccentric lowering, but only 1-2/10.  Patient demonstrated improvement with improved ankle strength and stability with strengthening and decreased muscle tightness and tone after soft tissue work.   Patient will continue to benefit from PT to improve foot flexibility and strength to work towards goals of decreasing pain and improving function.        Plan:        POC 3/5- continue 1-2X/week -Monitor response to treatment and adjust plan as needed.   To continue with  current POC with emphasis on decreasing pain and restoring full function L foot and ankle.   Continued with foot stretching and strengthening.        Subjective:  Patient reports 3-4/10 pain with 1st steps after sitting, 1/10 pain 1st thing in the morning.   She notes she does feel the pain is less intense overall. She notes walking tolerance is better. Changed shoes and she feels that helped     Performing HEP: yes    Precautions  Precautions Comment: h/o A-fib and A-tachycardia, R TKA, L TSA, DM; STEADI fall risk at 3, mild fall risk.    Pain   0/10 left plantar fascia/heel     Objective:     Treatment:    Manual Therapy (62464):  Manual therapy completed this date to improve tissue and joint mobility to allow for improved body/joint mechanics/ROM and to decrease pain.   25minutes      -left plantar fascia strumming and release, left plantar fascia insertion at calcaneous TFM, left achilles TFM, left gastroc soft tissue mobilization to increase soft tissue extensibility and reduce pain     -calcaneal distraction with gentle inversion and eversion mobilizations Grade II with posterior subtalar mobilizations grade III to increase joint mobility    Therapeutic Exercise (84153):   21 MINUTES  Therapeutic exercises completed this date to improve strength and postural control to improve ability to perform functional activities safely.        -supine calf stretch with strap 3X hold 30 seconds  -longsit AROM left ankle PF/DF, circles clockwise and counterclockwise 20X and alphabet 1X  -orange t-band PF/DF, INV/EVER 20X each  -standing heel raises with eccentric lowering 10X  -standing calf stretch off step 3X hold 30 seconds  -standing calf stretch 3X hold 30 seconds     Education:    Continue with current HEP, no progression for home     Heike Donnelly, PT    Active       Mobility       LTG - Patient will demonstrate independence and compliance with safe and appropriate HEP for pain reduction, ROM/flexibility,  LE  strength and postural correction.  (Progressing)       Start:  06/05/25    Expected End:  09/03/25            LTG - Patient with increase LE strength hip and ankle by 1 MMT grade for improved functional strength with walking.  (Progressing)       Start:  06/05/25    Expected End:  09/03/25            LTG - Patient will increase B dorsiflexion range of motion to 10 degrees for improved functional stride length with walking. (Progressing)       Start:  06/05/25    Expected End:  09/03/25            LTG - Patient will increase 30 second sit to stand test to 10X for improved functional strength with transfers.  (Progressing)       Start:  06/05/25    Expected End:  09/03/25         Goal Note       9X               LTG - Patient will increase TUG time to 15 seconds for improved functional gait mobility.  (Met)       Start:  06/05/25    Expected End:  09/03/25    Resolved:  07/07/25         LTG - Patient will demonstrate only mild sway with tandem stance x 20 seconds showing improved overall balance and decreased risk of falls.  (Met)       Start:  06/05/25    Expected End:  09/03/25    Resolved:  07/07/25         STG - Patient will increase TUG time to 18 seconds for improved functional strength with transfers.  (Met)       Start:  06/05/25    Expected End:  07/07/25    Resolved:  07/07/25         STG - Patient increase LE strength by 1/2 MMT grade for improved functional strength with walking.  (Met)       Start:  06/05/25    Expected End:  07/07/25    Resolved:  07/07/25         Patient Goal 1 - Client will be able to walk with full weight bearing and normal mechanics and stride length with only 2-3/10 pain level for return to functional daily mobility.  (Progressing)       Start:  06/05/25    Expected End:  09/03/25

## 2025-07-31 ENCOUNTER — APPOINTMENT (OUTPATIENT)
Dept: AUDIOLOGY | Facility: CLINIC | Age: 75
End: 2025-07-31
Payer: MEDICARE

## 2025-07-31 DIAGNOSIS — H90.3 SENSORINEURAL HEARING LOSS (SNHL) OF BOTH EARS: Primary | ICD-10-CM

## 2025-07-31 NOTE — PROGRESS NOTES
HEARING AID CHECK    Name:  Rachael Shah  :  1950  Age:  74 y.o.    HEARING AID INFORMATION:    Right Hearing Aid  Oticon More 1 miniRITE-R  SN: [id number]  Warranty:  -   Left Hearing Aid  Oticon More 1 miniRITE - R  SN: [id number]  Warranty:  -     SN: [id number]   Length   Right: 2(85)  Left: 2(85)  Dome/Earmold  Right: 8 mm open bass  Left: 8 mm open bass  Wax Filter  ProWax miniFit  Battery Size  Rechargeable  HCS Follow-up Expiration   -       HISTORY:    Patient denied any concerns or complaints at this time.    EXAM/PROCEDURES:    Visual inspection revealed moderate cerumen accumulation on the domes with clear wax guards.  Cleaned and checked hearing aids.  Vacuumed sharda ports and  ports.  Completed Redux dehumidification treatment for patient's hearing aids where <0.5 uL of moisture was removed during a 3:51 minute cycle. Replaced wax filters and domes. Final listening check indicated adequate sound quality and function with no distortion of internal feedback.  Completed firmware update.  Patient declined any programming changes at this time.    Patient paid hearing aid check appointment fee.    RECOMMENDATIONS AND FOLLOW-UP:    - Continue use of amplification and follow-up as recommended for hearing aid maintenance and adjustments.  - Recommended 6-month follow-up HAC. Patient to contact the office sooner if problems arise.  - Monitor and recheck hearing as warranted.  - Counseled regarding results and recommendations.      Cynthia Garcia  Clinical Audiologist

## 2025-08-01 ENCOUNTER — TREATMENT (OUTPATIENT)
Dept: PHYSICAL THERAPY | Facility: CLINIC | Age: 75
End: 2025-08-01
Payer: MEDICARE

## 2025-08-01 DIAGNOSIS — R29.3 ABNORMAL POSTURE: ICD-10-CM

## 2025-08-01 DIAGNOSIS — M54.42 CHRONIC MIDLINE LOW BACK PAIN WITH LEFT-SIDED SCIATICA: ICD-10-CM

## 2025-08-01 DIAGNOSIS — R26.9 GAIT ABNORMALITY: Primary | ICD-10-CM

## 2025-08-01 DIAGNOSIS — M62.81 WEAKNESS OF TRUNK MUSCULATURE: ICD-10-CM

## 2025-08-01 DIAGNOSIS — G89.29 CHRONIC MIDLINE LOW BACK PAIN WITH LEFT-SIDED SCIATICA: ICD-10-CM

## 2025-08-01 DIAGNOSIS — M53.80 DECREASED RANGE OF MOTION OF SPINE: ICD-10-CM

## 2025-08-01 PROCEDURE — 97140 MANUAL THERAPY 1/> REGIONS: CPT | Mod: GP | Performed by: PHYSICAL THERAPIST

## 2025-08-01 PROCEDURE — 97110 THERAPEUTIC EXERCISES: CPT | Mod: GP | Performed by: PHYSICAL THERAPIST

## 2025-08-01 NOTE — PROGRESS NOTES
Physical Therapy                                                                                  Physical Therapy Treatment       Patient name: Rachael Shah  MRN:   [id number]  Today's Date: 8/1/2025     Time Calculation  Start Time: 0847  Stop Time: 0930  Time Calculation (min): 43 min    1. Gait abnormality        2. Abnormal posture        3. Decreased range of motion of spine        4. Weakness of trunk musculature        5. Chronic midline low back pain with left-sided sciatica  Follow Up In Physical Therapy          PT  Visit  PT Received On: 08/01/25  Response to Previous Treatment: Patient with no complaints from previous session.  General  Reason for Referral: chronic midline LBP  Referred By: Dr. CLAUDINE Peoples  Past Medical History Relevant to Rehab: Past medical history significant for right TKR; mild neuropathy (numbness); Hypertension, Hypercholesteremia, Paroxysmal atrial fibrillation and atrial tachycardia she had been chemically converted however converted back to A-fib has had 3 separate ablations she remains on anticoagulation with Xarelto and metoprolol for rate control; Severe DJD; right knee replacement; left shoulder replacement; Low back pain; Diabetes; Hearing loss now hearing aids  General Comment: LBP:  TOTAL 16, 3 of 5.   Insurance:  07/14/2025: 5V PT APPROVED FOR BACK PAIN   7/8/2025 - 8/6/2025   APPROVED CODES: 81129, 74659, 51532    Assessment:  Patient completed session today with moderate effort and had difficulty with bridges, therefore held off for today.  Patient demonstrated improvement with core and back strength with progression of core and back strengthening today with no increase in pain.   Patient will continue to benefit from PT to improve back and core strength and stability and range of motion to work towards goals of decreasing pain and improving function.        Plan:    POC 3/5- continue 2X/week - Manual therapy to B LS region and hips. Progress back and core  "strengthening and stretching.   Continue with Emma extension and hip strengthening, and stretching as able.   Monitor response to treatment and adjust plan as needed.   To continue with current POC.       Subjective:  Patient reports pain is less intense. She notes not quit as stiff 1st thing in the morning. No radicular leg pain. .       Performing HEP: yes    Precautions  Precautions Comment: h/o A-fib and A-tachycardia, R TKA, L TSA, DM; STEADI fall risk at 3, mild fall risk.    Pain   2-3/10 B low back soreness today    Objective:   NT    Treatment:    Therapeutic Exercise (93114):   28 MINUTES  Therapeutic exercises completed this date to improve strength and postural control to improve ability to perform functional activities safely.        -Prone lying x 2 mins  -Prone on elbows with deep breathing 1 x 10   -supine hip adduction with abdominal bracing 10X hold 10 seconds  -supine hip abduction with blue band with abdominal bracing 10X hold 10 seconds  -supine march with drawing with blue band at knees 15X hold 5 seconds  -SLR with drawing in 2X5 hold 2 seconds   -step up knee drivers 15X hold 5 seconds to increase hip and core strength  -standing hip abduction 15x    Manual Therapy (17940):  Manual therapy completed this date to improve tissue and joint mobility to allow for improved body/joint mechanics/ROM and to decrease pain.   15minutes      -seated B lumbar paraspinal soft tissue mobilization and B superior gluteal soft tissue mobilization and B quadratus lumborum soft tissue mobilization to increase soft tissue extensibility and reduce pain.     Exercises not performed today:  B glute sets 5\" hold 2 x 10   Press ups on forearms 3\" hold 2 x 10   R/L hamstring curls 2 x 10   B hip IR and ER with knees flexed 2 x 10 reps      Hooklying  APT/PPT 5\" hold 2 x 10 reps   R/L isometric hip/knee extension with small roll under knees  5\" hold 2 x 10  Bridges 3\" hold 2 x 10   LTR R/L 3\" hold 2 x 10    " "  Sidelying  R/L hip abduction 2 x 10      Deferred the following this date:  TA bracing with deep breathing 5\" hold x 15     Education:   Continue with current HEP    Heike Donnelly, PT    Active       PT Problem       Pain (Progressing)       Start:  04/18/25    Expected End:  06/13/25       Patient will report reduction of pain by  80%  to ease performance of bending and lifting, and driving greater than 30 mins.         ROM (Progressing)       Start:  04/18/25    Expected End:  06/13/25       Patient will demonstrate 25% improvement in lumbar AROM all planes and without pain,  to ease the performance of ADLS, bending and lifting to care for puppies and dogs.          Strength (Progressing)       Start:  04/18/25    Expected End:  06/13/25       Patient will increase trunk and LE strength by 1/3 MMT grade to facilitate endurance for good postural alignment, and to provide spinal protection during activity and static positions.         HEP (Met)       Start:  04/18/25    Expected End:  06/13/25    Resolved:  06/13/25    Patient will be independent and compliant with safe and recommended  HEP to enhance functional progress and long term management of condition.           Outcome (Progressing)       Start:  04/18/25    Expected End:  06/13/25       Patient will improve outcome measures score on  SIMON by 15%  to show a clinically significant improvement  in functional mobility.       Goal Note       Patient subjectively reporting improved tolerance for functional mobility, however Outcome score does not reflect changes.                          "

## 2025-08-04 ENCOUNTER — APPOINTMENT (OUTPATIENT)
Dept: PHYSICAL THERAPY | Facility: CLINIC | Age: 75
End: 2025-08-04
Payer: MEDICARE

## 2025-08-04 DIAGNOSIS — R26.9 GAIT ABNORMALITY: ICD-10-CM

## 2025-08-04 DIAGNOSIS — M72.2 PLANTAR FASCIITIS OF LEFT FOOT: ICD-10-CM

## 2025-08-04 DIAGNOSIS — R29.3 ABNORMAL POSTURE: ICD-10-CM

## 2025-08-04 DIAGNOSIS — M62.81 WEAKNESS OF TRUNK MUSCULATURE: ICD-10-CM

## 2025-08-04 DIAGNOSIS — M79.672 LEFT FOOT PAIN: ICD-10-CM

## 2025-08-04 DIAGNOSIS — M54.42 CHRONIC MIDLINE LOW BACK PAIN WITH LEFT-SIDED SCIATICA: ICD-10-CM

## 2025-08-04 DIAGNOSIS — M53.80 DECREASED RANGE OF MOTION OF SPINE: Primary | ICD-10-CM

## 2025-08-04 DIAGNOSIS — G89.29 CHRONIC MIDLINE LOW BACK PAIN WITH LEFT-SIDED SCIATICA: ICD-10-CM

## 2025-08-04 PROCEDURE — 97110 THERAPEUTIC EXERCISES: CPT | Mod: GP

## 2025-08-04 NOTE — PROGRESS NOTES
Physical Therapy                                                                                  Physical Therapy Treatment       Patient name: Rachael Shah  MRN:   [id number]  Today's Date: 8/4/2025  18      Time Calculation  Start Time: 0932  Stop Time: 1014  Time Calculation (min): 42 min    Assessment:  Patient completed session today with moderate effort.  She demonstrates decreased lumbar extension and decreased B hip extension motion in prone position.  Patient will continue to benefit from therapeutic exercises/HEP to improve back and core strength and stability and range of motion to work towards goals of decreasing pain and improving function. No significant change in symptoms at the end of session. Daily activities most likely aggravating her LBP symptoms.     Plan:   See one more visit for LB per insurance authorization.  Manual therapy to B LS region and hips. Update HEP including back/core and hip strengthening, Emma lumbar and hip extension, LE stretching.  Provide written instructions for back care and body mechanics for spinal and joint protection.     Current Problems:       1. Decreased range of motion of spine        2. Abnormal posture        3. Weakness of trunk musculature        4. Gait abnormality        5. Plantar fasciitis of left foot        6. Left foot pain        7. Chronic midline low back pain with left-sided sciatica  Follow Up In Physical Therapy          PT  Visit  Response to Previous Treatment: Patient with no complaints from previous session.     General  Reason for Referral: chronic midline LBP  Referred By: Dr. CLAUDINE Peoples  Past Medical History Relevant to Rehab: Past medical history significant for right TKR; mild neuropathy (numbness); Hypertension, Hypercholesteremia, Paroxysmal atrial fibrillation and atrial tachycardia she had been chemically converted however converted back to A-fib has had 3 separate ablations she remains on anticoagulation with Xarelto  "and metoprolol for rate control; Severe DJD; right knee replacement; left shoulder replacement; Low back pain; Diabetes; Hearing loss now hearing aids  General Comment: LBP:  TOTAL 18, 4 of 5.   Insurance:  07/14/2025: 5V PT APPROVED FOR BACK PAIN   7/8/2025 - 8/6/2025   APPROVED CODES: [id number], [id number], [id number]    Precautions  Precautions Comment: h/o A-fib and A-tachycardia, R TKA, L TSA, DM; STEADI fall risk at 3, mild fall risk.    Subjective:  Patient reported she is feeling about the same in regards to her pain.  Pain is intermittent across LB.  B hamstring pain overall less frequent.  Plantar fasciitis is still present, but better than when she started therapy.  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   Response to last session:  good   PT  Visit  Response to Previous Treatment: Patient with no complaints from previous session.  Performing HEP: yes every other day .  Discussed increasing frequency to once daily.     Pain  2/10 across LB at the start and end of session.     Treatment:    Therapeutic Exercise (31949):    42 MINUTES  Therapeutic exercises completed this date to improve strength and postural control to improve ability to perform functional activities safely.          Recumbent scifit, seat 12, 1.5 x 6 mins    Hooklying  PPT 5\" hold x 15  TA bracing with alt R/L bent knee raise 2 x 10  TA bracing with unsupported bent knee raise 2 x 10  TA bracing SLR R/L 2 x 10    Prone  -Prone on elbows with deep breathing 2  x 10 (unable to perform full prone press ups due to shoulder pain)  -B glut sets 5\" hold x 15  -R/L knee flexion 5\" hold 2 x 10  -R/L alt hip extension 1 x 10 (slight motion)    Hooklying  R/L hip flexor/quad stretch off edge of mat 10\" x 10     Standing  Instruction in standing back bends to increase lumbar and hip extension.     Manual Therapy  -deferred this date due to time constraints     Education:  Continue with " current HEP as previously instructed.     Outpatient Education  Education Comment: [facility] Access Code: [email or web address] (plantar fasciitis); Access Code: [email or web address] (LBP)    Goals    Physical Therapy - Physical Therapy - April 2025 Problems       Physical Therapy - Physical Therapy - April 2025 Problems (Active)       PT Problem       Pain (Progressing)       Start:  04/18/25    Expected End:  06/13/25       Patient will report reduction of pain by  80%  to ease performance of bending and lifting, and driving greater than 30 mins.         ROM (Progressing)       Start:  04/18/25    Expected End:  06/13/25       Patient will demonstrate 25% improvement in lumbar AROM all planes and without pain,  to ease the performance of ADLS, bending and lifting to care for puppies and dogs.          Strength (Progressing)       Start:  04/18/25    Expected End:  06/13/25       Patient will increase trunk and LE strength by 1/3 MMT grade to facilitate endurance for good postural alignment, and to provide spinal protection during activity and static positions.         HEP (Met)       Start:  04/18/25    Expected End:  06/13/25    Resolved:  06/13/25    Patient will be independent and compliant with safe and recommended  HEP to enhance functional progress and long term management of condition.           Outcome (Progressing)       Start:  04/18/25    Expected End:  06/13/25       Patient will improve outcome measures score on  SIMON by 15%  to show a clinically significant improvement  in functional mobility.       Goal Note       Patient subjectively reporting improved tolerance for functional mobility, however Outcome score does not reflect changes.                    Physical Therapy - Plantar fascitis Problems       Physical Therapy - Plantar fascitis Problems (Active)       Mobility       LTG - Patient will demonstrate independence and compliance with safe and appropriate HEP for pain reduction, ROM/flexibility,  LE strength and  postural correction.  (Progressing)       Start:  06/05/25    Expected End:  09/03/25            LTG - Patient with increase LE strength hip and ankle by 1 MMT grade for improved functional strength with walking.  (Progressing)       Start:  06/05/25    Expected End:  09/03/25            LTG - Patient will increase B dorsiflexion range of motion to 10 degrees for improved functional stride length with walking. (Progressing)       Start:  06/05/25    Expected End:  09/03/25            LTG - Patient will increase 30 second sit to stand test to 10X for improved functional strength with transfers.  (Progressing)       Start:  06/05/25    Expected End:  09/03/25         Goal Note       9X               LTG - Patient will increase TUG time to 15 seconds for improved functional gait mobility.  (Met)       Start:  06/05/25    Expected End:  09/03/25    Resolved:  07/07/25         LTG - Patient will demonstrate only mild sway with tandem stance x 20 seconds showing improved overall balance and decreased risk of falls.  (Met)       Start:  06/05/25    Expected End:  09/03/25    Resolved:  07/07/25         STG - Patient will increase TUG time to 18 seconds for improved functional strength with transfers.  (Met)       Start:  06/05/25    Expected End:  07/07/25    Resolved:  07/07/25         STG - Patient increase LE strength by 1/2 MMT grade for improved functional strength with walking.  (Met)       Start:  06/05/25    Expected End:  07/07/25    Resolved:  07/07/25         Patient Goal 1 - Client will be able to walk with full weight bearing and normal mechanics and stride length with only 2-3/10 pain level for return to functional daily mobility.  (Progressing)       Start:  06/05/25    Expected End:  09/03/25

## 2025-08-04 NOTE — PROGRESS NOTES
Subjective  Rachael Shah is a 74 y.o. female with a hx of obesity and *** who presents for weight management and obesity medicine follow up.    Current Plan  1. Nutrition: {KKSMANUTRITION:59767}     2. Sleep: {KKSMASLEEP:29467}      3. Stress: {KKSMASTRESS:19573}     4. Exercise: {KKSMAEXERCISE:94604}      5. Appetite control: {KKSMAAPPETITECONTROL:50976}  Obesity medication: {KKSMAMEDICATION:99090}     6. Prior Goals: {KKSMAPRIORGOALS:91587}     New Goals: {KKSMAGOALS:38472}    Weight trend:    Wt Readings from Last 10 Encounters:   03/26/25 90 kg (198 lb 6.4 oz)   03/17/25 88.9 kg (196 lb)   03/17/25 88.9 kg (196 lb)   02/26/25 92.1 kg (203 lb)   07/30/24 97.3 kg (214 lb 8 oz)   06/25/24 100 kg (220 lb 8 oz)   04/15/24 103 kg (226 lb)   04/11/24 101 kg (222 lb)   02/29/24 106 kg (233 lb)   02/29/24 106 kg (233 lb)       Current Medications[1]    ROS:  System: normal  Eyes : no visual changes  Neck : no tenderness, no new lumps/bumps  Respiratory : no SOB  Cardiovascular : no chest pain, no palpitations  Gastro-Intestinal : no abdominal concerns  Neurological : no numbness or tingling in the extremities  Musculoskeletal : no joint paint, no muscle pain  Skin : no unusual rashes  Psychiatric : no depression, no anxiety  See HPI for Endocrine ROS    Medical History[2]    Surgical History[3]    Social History     Socioeconomic History    Marital status:      Spouse name: Not on file    Number of children: Not on file    Years of education: Not on file    Highest education level: Not on file   Occupational History    Not on file   Tobacco Use    Smoking status: Former     Types: Cigarettes    Smokeless tobacco: Never   Vaping Use    Vaping status: Never Used   Substance and Sexual Activity    Alcohol use: Not on file    Drug use: Not on file    Sexual activity: Not on file   Other Topics Concern    Not on file   Social History Narrative    Originally from [place]    Went to [employer]  college 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  General : alert and oriented X3, no acute distress  Eyes : EOMI     Assessment/Plan   Rachael Shah is a 74 y.o. 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     1. Weight Management : Reviewed the principles of energy metabolism, caloric intake and expenditure, and rationale for a treatment program.  Also reinforced need for reduced calorie, low fat diet and increased physical activity.     We reviewed the possibility of starting an interdisciplinary lifestyle intervention program involving improvement of the diet, a personalized exercise program, efforts to reduce the stress and the possibility of using appetite suppressant medications in an effort to help with the weight loss process.  The patient expressed interest in the plan.     2. Nutrition : I discussed trying one of the diet approaches we have here in the program : Mediterranean lifestyle, ketosis diet.  The patient will be referred to the Registered Dietician for education on their diet of choice.  The dietary booklet was provided in the visit today.     2/12/24: 236lb, 39.34kg/m2  6/25/24: 220.5lb,  35.59kg/m2  2/26/25: 203lb, 32.77kg/m2  3/26/25: 198.4lb, 32.02lg/m2     3. Sleep : Has ANTON - on CPAP     4. Stress : 5/10, retired, volunteers, , no children, has 4 dogs!    --has been on prednisone this week for back pain.     5. Exercise : trying to do bands.     6. Appetite : is high  Discussed AOM's  Would AVOID a stimulant - difficult to control Afib  Consider contrave.     7. DM2 : on metformin  Added ozempic 1mg/wk - doing well on this.     8. Goal: to cut down red meat intake.     Follow up in a group visit.  Sky Cruz MD       [1]   Current Outpatient Medications:     azelastine (Astelin) 137 mcg (0.1 %) nasal spray, Administer 2 sprays into each nostril 2 times a day., Disp: , Rfl:     cholecalciferol (Vitamin D-3) 50 mcg (2,000 unit) capsule, Take 1 capsule (50 mcg) by mouth early in the morning.., Disp: , Rfl:     coffee xt-phosphatidyl serine (Neuriva Original) 100-100 mg capsule, Take 1 capsule by mouth once daily., Disp: , Rfl:     fluticasone (Flonase) 50 mcg/actuation nasal spray, Administer 2 sprays into each nostril once daily., Disp: , Rfl:     FreeStyle Lizz 2 Sensor kit, Use as instructed, Disp: 2 each, Rfl: 3    lisinopril 10 mg tablet, Take 1 tablet (10 mg) by mouth once daily., Disp: 90 tablet, Rfl: 3    metoprolol succinate XL (Toprol-XL) 25 mg 24 hr tablet, TAKE 1 TABLET (25 MG) BY MOUTH 2 TIMES A DAY. DO NOT CRUSH OR CHEW., Disp: 180 tablet, Rfl: 3    multivit-min/iron/FA/vit K/lut (CENTRUM SILVER WOMEN ORAL), Take 1 tablet by mouth once daily., Disp: , Rfl:     nystatin (Mycostatin) 100,000 unit/gram powder, Nystatin 474169 UNIT/GM External Powder APPLY 2-3 TIMES DAILY TO AFFECTED AREA, Disp: 120 g, Rfl: 3    penicillin v potassium (Veetid) 500 mg tablet, penicillin v potassium (Veetid) 500 mg tablet penicillin V potassium 500 mg tablet TAKE 2 TABS ONE HOUR PRIOR TO PROCEDURE, 1 TAB 6 HOURS POST FOR DENTAL WORK 0 Active, Disp: , Rfl:     predniSONE (Deltasone)  20 mg tablet, Take 1 tablet (20 mg) by mouth once daily. (Patient not taking: Reported on 3/26/2025), Disp: 3 tablet, Rfl: 0    rosuvastatin (Crestor) 10 mg tablet, TAKE 1 TABLET BY MOUTH EVERY DAY, Disp: 90 tablet, Rfl: 3    semaglutide (Ozempic) 1 mg/dose (4 mg/3 mL) pen injector, INJECT 1 MG UNDER THE SKIN EVERY 7 DAYS, Disp: 9 mL, Rfl: 1    tiZANidine (Zanaflex) 4 mg tablet, Take 1 tablet (4 mg) by mouth 2 times a day as needed for muscle spasms., Disp: 30 tablet, Rfl: 1    Xarelto 20 mg tablet, TAKE 1 TABLET BY MOUTH DAILY WITH THE EVENING MEAL, Disp: 90 tablet, Rfl: 3  [2]   Past Medical History:  Diagnosis Date    Atrial fibrillation (Multi)     Diabetes 1.5, managed as type 2 (Multi)     Hypertension     ANTON (obstructive sleep apnea)    [3] No past surgical history on file.

## 2025-08-05 ENCOUNTER — TREATMENT (OUTPATIENT)
Dept: PHYSICAL THERAPY | Facility: CLINIC | Age: 75
End: 2025-08-05
Payer: MEDICARE

## 2025-08-05 DIAGNOSIS — M54.42 CHRONIC MIDLINE LOW BACK PAIN WITH LEFT-SIDED SCIATICA: Primary | ICD-10-CM

## 2025-08-05 DIAGNOSIS — M72.2 PLANTAR FASCIITIS OF LEFT FOOT: ICD-10-CM

## 2025-08-05 DIAGNOSIS — R29.3 ABNORMAL POSTURE: ICD-10-CM

## 2025-08-05 DIAGNOSIS — R26.9 GAIT ABNORMALITY: ICD-10-CM

## 2025-08-05 DIAGNOSIS — M62.81 WEAKNESS OF TRUNK MUSCULATURE: ICD-10-CM

## 2025-08-05 DIAGNOSIS — M53.80 DECREASED RANGE OF MOTION OF SPINE: ICD-10-CM

## 2025-08-05 DIAGNOSIS — M79.672 LEFT FOOT PAIN: ICD-10-CM

## 2025-08-05 DIAGNOSIS — G89.29 CHRONIC MIDLINE LOW BACK PAIN WITH LEFT-SIDED SCIATICA: Primary | ICD-10-CM

## 2025-08-05 PROCEDURE — 97530 THERAPEUTIC ACTIVITIES: CPT | Mod: GP

## 2025-08-05 PROCEDURE — 97110 THERAPEUTIC EXERCISES: CPT | Mod: GP

## 2025-08-05 PROCEDURE — 97140 MANUAL THERAPY 1/> REGIONS: CPT | Mod: GP

## 2025-08-05 NOTE — PROGRESS NOTES
Physical Therapy                                                                                  Physical Therapy Treatment /PT Discharge Summary - Lumbar       Patient name: Rachael Shah  MRN:   [id number]  Today's Date: 8/5/25  19      Time Calculation  Start Time: 1133  Stop Time: 1216  Time Calculation (min): 43 min    Assessment:  Patient has been seen for 19 visits of PT to address chronic LBP and BLE sciatica symptoms.  Overall patient reports improvement in RLE pain frequency, however she continues to experience LBP.   Left LE pain is significantly less.    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 These daily activities most likely aggravating her LBP symptoms.   She reports bending backwards helps to loosen up spine and decrease pain in LE's (centralization).   She is able to stand straighter with improved trunk and LE extension.  She demonstrates increased B knee extension ROM. See below for goal status.      Plan:        Discharge PT for lumbar region.  Patient to continue indep with HEP and follow up with MD for persistent complaints.        Current Problems:       1. Chronic midline low back pain with left-sided sciatica  Follow Up In Physical Therapy      2. Abnormal posture        3. Decreased range of motion of spine        4. Weakness of trunk musculature        5. Gait abnormality        6. Plantar fasciitis of left foot        7. Left foot pain                  General  Reason for Referral: chronic midline LBP  Referred By: Dr. CLAUDINE Peoples  Past Medical History Relevant to Rehab: Past medical history significant for right TKR; mild neuropathy (numbness); Hypertension, Hypercholesteremia, Paroxysmal atrial fibrillation and atrial tachycardia she had been chemically converted however converted back to A-fib has had 3 separate ablations she remains on anticoagulation with Xarelto and  metoprolol for rate control; Severe DJD; right knee replacement; left shoulder replacement; Low back pain; Diabetes; Hearing loss now hearing aids  General Comment: LBP:  TOTAL 19, 5 of 5.   Insurance:  07/14/2025: [employer] APPROVED FOR BACK PAIN   7/8/2025 - 8/6/2025   APPROVED CODES: 55253, 50879, 60243    Precautions  Precautions Comment: h/o A-fib and A-tachycardia, R TKA, L TSA, DM; STEADI fall risk at 3, mild fall risk.    Subjective:  Patient reported pain right LB and buttocks and hamstrings rated 2/10 at present.  Patient reports back pain persists intermittently, but B hamstring pain is less frequent.  States hamstring pain responds to exercises. 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   Response to last session:   no problems  Performing HEP: yes     Pain  2/10 at the start and end of session     Objective:  Posture  Min  FH, flattened spinal curves but patient able to stand straighter. Increased spinal and LE extension during gait.      Gait  Ambulates without assistive device with the following gait deviations:  Improved positioning of BLE rotation but still slightly ER, improved B TKE at IC.     Lumbar AROM  Flexion mod restriction, min pain at end range, decreased reversal of TL curve  Extension mod restriction, min pain during motion   Rotation R 50%, min pain end range  Rotation L 50% min pain end range      Strength  Hip flexion sitting B 4+/5   Hip flexion supine B 3+/5, decreased DLS  Bridges - 25%  Hip abduction sidelying L 3/5, R 3/5 - both painful  Knee extension B 5/5  Knee flexion B 4+/5  Ankle DF B 5/5  Ankle PF B 4/5  Abdominals fair  Core/DLS fair  Trunk extensors  poor     ROM  Knee  Left  2 - 116 deg A  (significantly improved from  deg)  Right 0-121  deg A (improved from 3 - 119 deg A)        Flexibility  Hamstrings L 58 deg, R 58 deg painful  Piriformis L good , R fair plus painful  Hip flexors B fair  "minus  Gastrocs B fair     Neuro  Slump Test - B negative  Sensation -B LE intact to light touch   SLR - B negative      Outcome Measures:  Other Measures  Oswestry Disablity Index (SIMON): Score 20, 40% disability (improved from score 27, 54% disability)      Treatment:   Therapeutic Activity 22 mins  Brief re-check, see subjective and objective section for details.    Therapeutic exercises 13mins  Prone lying with deep breathing 1 x 10 cycles   Prone on elbows with deep breathing  2 x 10 cycles  R/L TKE in prone 2 x 10 reps    Seated   APT/PPT 2 x 10 reps    Standing  Gentle back bends 2 x 10 reps    Manual therapy x 8 mins  Prone   STM to B lumbosacral region and lumbar paraspinals     Education:  Outpatient Education  Education Comment: [employer] Access Code: [email or web address] (plantar fasciitis); Access Code: [email or web address] (LBP)    Access Code: [email or web address]  URL: [email or web address]  Date: 08/06/2025  Prepared by: Cari Aquino    Program Notes  STOP ANY EXERCISE THAT CAUSES A LASTING INCREASE IN PAIN, ESPECIALLY IN THE LEGS.  DO ALL EXERCISES WITHIN PAINFREE RANGE OF MOTION AND TOLERANCE.     Exercises  - Supine Transversus Abdominis Bracing - Hands on Stomach  - 1 x daily - 5 x weekly - 2 sets - 10 reps - 5\" hold  - Supine Hip Adduction Isometric with Ball  - 1 x daily - 5 x weekly - 2 sets - 10 reps - 5\" hold  - Hooklying Clamshell with Resistance  - 1 x daily - 5 x weekly - 2 sets - 10 reps - 5\" hold  - Beginner Knee Fold  - 1 x daily - 5 x weekly - 2 sets - 10 reps - 5\" hold  - Supine Hamstring Stretch with Strap  - 1 x daily - 5 x weekly - 1 sets - 3 reps - 20\" hold  - Clam with Resistance  - 1 x daily - 5 x weekly - 2 sets - 10 reps - 5\" hold  - Sidelying Hip Abduction  - 1 x daily - 5 x weekly - 2 sets - 10 reps - 5\" hold  - Prone on Elbows Stretch  - 1 x daily - 5 x weekly - 2 sets - 10 reps - 5\" hold  - Prone Terminal Knee Extension  - 1 x daily - 5 x weekly - 2 sets - 10 reps - 5\" hold  - Prone " "Knee Flexion AROM  - 1 x daily - 5 x weekly - 2 sets - 10 reps - 5\" hold  - Seated Piriformis Stretch  - 1 x daily - 5 x weekly - 1 sets - 3 reps - 20\" hold  - Seated Calf Stretch with Strap  - 1 x daily - 5 x weekly - 1 sets - 4-6 reps - 20-30\" hold  - Seated Anterior Pelvic Tilt  - 1 x daily - 7 x weekly - 10 reps  - Standing Lumbar Extension with Counter  - 1 x daily - 5 x weekly - 1 sets - 10 reps - 3\" hold    Goals    Physical Therapy - Physical Therapy - April 2025 Problems       Physical Therapy - Physical Therapy - April 2025 Problems (Resolved)       PT Problem       Pain (Adequate for Discharge)       Start:  04/18/25    Expected End:  06/13/25       Patient will report reduction of pain by  80%  to ease performance of bending and lifting, and driving greater than 30 mins.         ROM (Adequate for Discharge)       Start:  04/18/25    Expected End:  06/13/25       Patient will demonstrate 25% improvement in lumbar AROM all planes and without pain,  to ease the performance of ADLS, bending and lifting to care for puppies and dogs.          Strength (Adequate for Discharge)       Start:  04/18/25    Expected End:  06/13/25       Patient will increase trunk and LE strength by 1/3 MMT grade to facilitate endurance for good postural alignment, and to provide spinal protection during activity and static positions.         HEP (Met)       Start:  04/18/25    Expected End:  06/13/25    Resolved:  06/13/25    Patient will be independent and compliant with safe and recommended  HEP to enhance functional progress and long term management of condition.           Outcome (Met)       Start:  04/18/25    Expected End:  06/13/25    Resolved:  08/06/25    Patient will improve outcome measures score on  SIMON by 15%  to show a clinically significant improvement  in functional mobility.       Goal Note       Improved from 54% to 40% disability.                    Physical Therapy - Plantar fascitis Problems       Physical " Therapy - Plantar fascitis Problems (Active)       Mobility       LTG - Patient will demonstrate independence and compliance with safe and appropriate HEP for pain reduction, ROM/flexibility,  LE strength and postural correction.  (Progressing)       Start:  06/05/25    Expected End:  09/03/25            LTG - Patient with increase LE strength hip and ankle by 1 MMT grade for improved functional strength with walking.  (Progressing)       Start:  06/05/25    Expected End:  09/03/25            LTG - Patient will increase B dorsiflexion range of motion to 10 degrees for improved functional stride length with walking. (Progressing)       Start:  06/05/25    Expected End:  09/03/25            LTG - Patient will increase 30 second sit to stand test to 10X for improved functional strength with transfers.  (Progressing)       Start:  06/05/25    Expected End:  09/03/25         Goal Note       9X               LTG - Patient will increase TUG time to 15 seconds for improved functional gait mobility.  (Met)       Start:  06/05/25    Expected End:  09/03/25    Resolved:  07/07/25         LTG - Patient will demonstrate only mild sway with tandem stance x 20 seconds showing improved overall balance and decreased risk of falls.  (Met)       Start:  06/05/25    Expected End:  09/03/25    Resolved:  07/07/25         STG - Patient will increase TUG time to 18 seconds for improved functional strength with transfers.  (Met)       Start:  06/05/25    Expected End:  07/07/25    Resolved:  07/07/25         STG - Patient increase LE strength by 1/2 MMT grade for improved functional strength with walking.  (Met)       Start:  06/05/25    Expected End:  07/07/25    Resolved:  07/07/25         Patient Goal 1 - Client will be able to walk with full weight bearing and normal mechanics and stride length with only 2-3/10 pain level for return to functional daily mobility.  (Progressing)       Start:  06/05/25    Expected End:  09/03/25

## 2025-08-06 ENCOUNTER — APPOINTMENT (OUTPATIENT)
Dept: ENDOCRINOLOGY | Facility: CLINIC | Age: 75
End: 2025-08-06
Payer: MEDICARE

## 2025-08-06 VITALS
SYSTOLIC BLOOD PRESSURE: 110 MMHG | HEIGHT: 66 IN | DIASTOLIC BLOOD PRESSURE: 68 MMHG | BODY MASS INDEX: 31.76 KG/M2 | TEMPERATURE: 97.3 F | WEIGHT: 197.6 LBS | HEART RATE: 78 BPM

## 2025-08-06 DIAGNOSIS — E11.9 TYPE 2 DIABETES MELLITUS WITHOUT COMPLICATION, WITHOUT LONG-TERM CURRENT USE OF INSULIN: ICD-10-CM

## 2025-08-06 DIAGNOSIS — Z76.89 ENCOUNTER FOR WEIGHT MANAGEMENT: ICD-10-CM

## 2025-08-06 DIAGNOSIS — E66.811 CLASS 1 OBESITY: Primary | ICD-10-CM

## 2025-08-06 PROCEDURE — 99215 OFFICE O/P EST HI 40 MIN: CPT | Performed by: INTERNAL MEDICINE

## 2025-08-06 PROCEDURE — 1159F MED LIST DOCD IN RCRD: CPT | Performed by: INTERNAL MEDICINE

## 2025-08-06 PROCEDURE — 3074F SYST BP LT 130 MM HG: CPT | Performed by: INTERNAL MEDICINE

## 2025-08-06 PROCEDURE — 4010F ACE/ARB THERAPY RXD/TAKEN: CPT | Performed by: INTERNAL MEDICINE

## 2025-08-06 PROCEDURE — 3078F DIAST BP <80 MM HG: CPT | Performed by: INTERNAL MEDICINE

## 2025-08-06 RX ORDER — SEMAGLUTIDE 2.68 MG/ML
2 INJECTION, SOLUTION SUBCUTANEOUS
Qty: 9 ML | Refills: 2 | Status: SHIPPED | OUTPATIENT
Start: 2025-08-10

## 2025-08-08 DIAGNOSIS — E78.00 PURE HYPERCHOLESTEROLEMIA: ICD-10-CM

## 2025-08-08 RX ORDER — ROSUVASTATIN CALCIUM 10 MG/1
10 TABLET, COATED ORAL DAILY
Qty: 90 TABLET | Refills: 3 | Status: SHIPPED | OUTPATIENT
Start: 2025-08-08

## 2025-08-12 ENCOUNTER — OFFICE VISIT (OUTPATIENT)
Dept: CARDIOLOGY | Facility: CLINIC | Age: 75
End: 2025-08-12
Payer: MEDICARE

## 2025-08-12 VITALS
WEIGHT: 199.3 LBS | HEIGHT: 66 IN | OXYGEN SATURATION: 96 % | BODY MASS INDEX: 32.03 KG/M2 | SYSTOLIC BLOOD PRESSURE: 108 MMHG | HEART RATE: 87 BPM | DIASTOLIC BLOOD PRESSURE: 68 MMHG

## 2025-08-12 DIAGNOSIS — I48.0 PAROXYSMAL ATRIAL FIBRILLATION (MULTI): Primary | ICD-10-CM

## 2025-08-12 DIAGNOSIS — I47.19 PAT (PAROXYSMAL ATRIAL TACHYCARDIA): ICD-10-CM

## 2025-08-12 PROCEDURE — 3074F SYST BP LT 130 MM HG: CPT | Performed by: INTERNAL MEDICINE

## 2025-08-12 PROCEDURE — 1126F AMNT PAIN NOTED NONE PRSNT: CPT | Performed by: INTERNAL MEDICINE

## 2025-08-12 PROCEDURE — 3078F DIAST BP <80 MM HG: CPT | Performed by: INTERNAL MEDICINE

## 2025-08-12 PROCEDURE — 1159F MED LIST DOCD IN RCRD: CPT | Performed by: INTERNAL MEDICINE

## 2025-08-12 PROCEDURE — 93010 ELECTROCARDIOGRAM REPORT: CPT | Performed by: INTERNAL MEDICINE

## 2025-08-12 PROCEDURE — 99214 OFFICE O/P EST MOD 30 MIN: CPT | Performed by: INTERNAL MEDICINE

## 2025-08-12 PROCEDURE — 4010F ACE/ARB THERAPY RXD/TAKEN: CPT | Performed by: INTERNAL MEDICINE

## 2025-08-12 PROCEDURE — 93005 ELECTROCARDIOGRAM TRACING: CPT | Performed by: INTERNAL MEDICINE

## 2025-08-12 ASSESSMENT — CHA2DS2 SCORE
SEX: FEMALE
VASCULAR DISEASE: NO
PRIOR STROKE OR TIA OR THROMBOEMBOLISM: NO
CHF OR LEFT VENTRICULAR DYSFUNCTION: NO
CHA2D2S VASC SCORE: 5
HYPERTENSION: YES
AGE IN YEARS: 75+
DIABETES: YES

## 2025-08-12 ASSESSMENT — PATIENT HEALTH QUESTIONNAIRE - PHQ9
SUM OF ALL RESPONSES TO PHQ9 QUESTIONS 1 AND 2: 0
1. LITTLE INTEREST OR PLEASURE IN DOING THINGS: NOT AT ALL
2. FEELING DOWN, DEPRESSED OR HOPELESS: NOT AT ALL

## 2025-08-12 ASSESSMENT — PAIN SCALES - GENERAL: PAINLEVEL_OUTOF10: 0-NO PAIN

## 2025-08-13 ENCOUNTER — APPOINTMENT (OUTPATIENT)
Dept: ENDOCRINOLOGY | Facility: CLINIC | Age: 75
End: 2025-08-13
Payer: MEDICARE

## 2025-08-13 DIAGNOSIS — I48.0 PAROXYSMAL ATRIAL FIBRILLATION (MULTI): Primary | ICD-10-CM

## 2025-08-14 DIAGNOSIS — I48.0 PAROXYSMAL ATRIAL FIBRILLATION (MULTI): Primary | ICD-10-CM

## 2025-08-14 LAB
ATRIAL RATE: 394 BPM
Q ONSET: 224 MS
QRS COUNT: 12 BEATS
QRS DURATION: 76 MS
QT INTERVAL: 358 MS
QTC CALCULATION(BAZETT): 389 MS
QTC FREDERICIA: 378 MS
R AXIS: 51 DEGREES
T AXIS: 55 DEGREES
T OFFSET: 403 MS
VENTRICULAR RATE: 71 BPM

## 2025-08-20 ENCOUNTER — APPOINTMENT (OUTPATIENT)
Dept: PHYSICAL THERAPY | Facility: CLINIC | Age: 75
End: 2025-08-20
Payer: MEDICARE

## 2025-08-20 ENCOUNTER — DOCUMENTATION (OUTPATIENT)
Dept: PHYSICAL THERAPY | Facility: CLINIC | Age: 75
End: 2025-08-20
Payer: MEDICARE

## 2025-08-20 DIAGNOSIS — R26.9 GAIT ABNORMALITY: Primary | ICD-10-CM

## 2025-08-20 DIAGNOSIS — M72.2 PLANTAR FASCIITIS OF LEFT FOOT: ICD-10-CM

## 2025-08-20 DIAGNOSIS — R29.3 ABNORMAL POSTURE: ICD-10-CM

## 2025-08-20 DIAGNOSIS — M62.81 WEAKNESS OF TRUNK MUSCULATURE: ICD-10-CM

## 2025-08-20 DIAGNOSIS — M79.672 LEFT FOOT PAIN: ICD-10-CM

## 2025-08-20 DIAGNOSIS — M53.80 DECREASED RANGE OF MOTION OF SPINE: ICD-10-CM

## 2025-08-28 ENCOUNTER — TELEPHONE (OUTPATIENT)
Dept: SCHEDULING | Age: 75
End: 2025-08-28
Payer: MEDICARE

## 2025-08-28 DIAGNOSIS — I48.0 PAROXYSMAL ATRIAL FIBRILLATION (MULTI): ICD-10-CM

## 2025-09-02 ENCOUNTER — TREATMENT (OUTPATIENT)
Dept: PHYSICAL THERAPY | Facility: CLINIC | Age: 75
End: 2025-09-02
Payer: MEDICARE

## 2025-09-02 ENCOUNTER — HOSPITAL ENCOUNTER (OUTPATIENT)
Dept: CARDIOLOGY | Facility: CLINIC | Age: 75
Discharge: HOME | End: 2025-09-02
Payer: MEDICARE

## 2025-09-02 DIAGNOSIS — I48.0 PAROXYSMAL ATRIAL FIBRILLATION (MULTI): ICD-10-CM

## 2025-09-02 DIAGNOSIS — R26.9 GAIT ABNORMALITY: Primary | ICD-10-CM

## 2025-09-02 DIAGNOSIS — M72.2 PLANTAR FASCIITIS OF LEFT FOOT: ICD-10-CM

## 2025-09-02 DIAGNOSIS — M72.2 PLANTAR FASCIITIS: ICD-10-CM

## 2025-09-02 DIAGNOSIS — M79.672 LEFT FOOT PAIN: ICD-10-CM

## 2025-09-02 LAB
ATRIAL RATE: 394 BPM
P AXIS: 95 DEGREES
Q ONSET: 220 MS
QRS COUNT: 10 BEATS
QRS DURATION: 80 MS
QT INTERVAL: 386 MS
QTC CALCULATION(BAZETT): 378 MS
QTC FREDERICIA: 381 MS
R AXIS: 53 DEGREES
T AXIS: 29 DEGREES
T OFFSET: 413 MS
VENTRICULAR RATE: 58 BPM

## 2025-09-02 PROCEDURE — 97110 THERAPEUTIC EXERCISES: CPT | Mod: GP | Performed by: PHYSICAL THERAPIST

## 2025-09-02 PROCEDURE — 0932T N-INVS DET HRT FAIL AUG ECHO: CPT

## 2025-09-02 PROCEDURE — 93306 TTE W/DOPPLER COMPLETE: CPT

## 2025-09-02 PROCEDURE — 93005 ELECTROCARDIOGRAM TRACING: CPT

## 2025-09-02 PROCEDURE — 97140 MANUAL THERAPY 1/> REGIONS: CPT | Mod: GP | Performed by: PHYSICAL THERAPIST

## 2025-09-02 PROCEDURE — 97530 THERAPEUTIC ACTIVITIES: CPT | Mod: GP | Performed by: PHYSICAL THERAPIST

## 2025-09-03 LAB
AORTIC VALVE MEAN GRADIENT: 5 MMHG
AORTIC VALVE PEAK VELOCITY: 1.49 M/S
AV PEAK GRADIENT: 9 MMHG
AVA (PEAK VEL): 1.86 CM2
AVA (VTI): 2 CM2
EJECTION FRACTION APICAL 4 CHAMBER: 47.5
EJECTION FRACTION: 58 %
LEFT ATRIUM VOLUME AREA LENGTH INDEX BSA: 30.8 ML/M2
LEFT VENTRICLE INTERNAL DIMENSION DIASTOLE: 4.22 CM (ref 3.5–6)
LEFT VENTRICULAR OUTFLOW TRACT DIAMETER: 1.76 CM
RIGHT VENTRICLE FREE WALL PEAK S': 14 CM/S
RIGHT VENTRICLE PEAK SYSTOLIC PRESSURE: 32 MMHG
TRICUSPID ANNULAR PLANE SYSTOLIC EXCURSION: 2 CM

## 2025-09-05 ENCOUNTER — APPOINTMENT (OUTPATIENT)
Dept: CARDIOLOGY | Facility: HOSPITAL | Age: 75
End: 2025-09-05
Payer: MEDICARE

## 2025-09-16 ENCOUNTER — APPOINTMENT (OUTPATIENT)
Dept: ENDOCRINOLOGY | Facility: CLINIC | Age: 75
End: 2025-09-16
Payer: MEDICARE

## 2025-10-09 ENCOUNTER — APPOINTMENT (OUTPATIENT)
Dept: ENDOCRINOLOGY | Facility: CLINIC | Age: 75
End: 2025-10-09
Payer: MEDICARE

## 2025-11-10 ENCOUNTER — APPOINTMENT (OUTPATIENT)
Dept: ENDOCRINOLOGY | Facility: CLINIC | Age: 75
End: 2025-11-10
Payer: MEDICARE

## 2025-12-17 ENCOUNTER — APPOINTMENT (OUTPATIENT)
Dept: ENDOCRINOLOGY | Facility: CLINIC | Age: 75
End: 2025-12-17
Payer: MEDICARE

## 2026-01-29 ENCOUNTER — APPOINTMENT (OUTPATIENT)
Dept: AUDIOLOGY | Facility: CLINIC | Age: 76
End: 2026-01-29
Payer: MEDICARE